# Patient Record
Sex: FEMALE | Race: WHITE | NOT HISPANIC OR LATINO | URBAN - METROPOLITAN AREA
[De-identification: names, ages, dates, MRNs, and addresses within clinical notes are randomized per-mention and may not be internally consistent; named-entity substitution may affect disease eponyms.]

---

## 2021-06-10 ENCOUNTER — OFFICE VISIT (OUTPATIENT)
Dept: DERMATOLOGY | Facility: CLINIC | Age: 86
End: 2021-06-10
Payer: COMMERCIAL

## 2021-06-10 VITALS — TEMPERATURE: 98.3 F

## 2021-06-10 DIAGNOSIS — L81.4 SOLAR LENTIGO: Primary | ICD-10-CM

## 2021-06-10 DIAGNOSIS — D48.5 NEOPLASM OF UNCERTAIN BEHAVIOR OF SKIN: ICD-10-CM

## 2021-06-10 PROCEDURE — 99203 OFFICE O/P NEW LOW 30 MIN: CPT | Performed by: DERMATOLOGY

## 2021-06-10 NOTE — PATIENT INSTRUCTIONS
Assessment and Plan:  Based on a thorough discussion of this condition and the management approach to it (including a comprehensive discussion of the known risks, side effects and potential benefits of treatment), the patient (family) agrees to implement the following specific plan:   Reassured benign   When outside we recommend using a wide brim hat, sunglasses, long sleeve and pants, sunscreen with SPF 14+ with reapplication every 2 hours, or SPF specific clothing      What is a lentigo? A lentigo is a pigmented flat or slightly raised lesion with a clearly defined edge  Unlike an ephelis (freckle), it does not fade in the winter months  There are several kinds of lentigo  The name lentigo originally referred to its appearance resembling a small lentil  The plural of lentigo is lentigines, although lentigos is also in common use  Who gets lentigines? Lentigines can affect males and females of all ages and races  Solar lentigines are especially prevalent in fair skinned adults  Lentigines associated with syndromes are present at birth or arise during childhood  What causes lentigines? Common forms of lentigo are due to exposure to ultraviolet radiation:   Sun damage including sunburn    Indoor tanning    Phototherapy, especially photochemotherapy (PUVA)    Ionizing radiation, eg radiation therapy, can also cause lentigines  Several familial syndromes associated with widespread lentigines originate from mutations in Denzel-MAP kinase, mTOR signaling and PTEN pathways  What are the clinical features of lentigines? Lentigines have been classified into several different types depending on what they look like, where they appear on the body, causative factors, and whether they are associated to other diseases or conditions  Lentigines may be solitary or more often, multiple  Most lentigines are smaller than 5 mm in diameter      Lentigo simplex   A precursor to junctional naevus    Arises during childhood and early adult life    Found on trunk and limbs    Small brown round or oval macule or thin plaque    Jagged or smooth edge    May have a dry surface    May disappear in time  Solar lentigo   A precursor to seborrhoeic keratosis    Found on chronically sun exposed sites such as hands, face, lower legs    May also follow sunburn to shoulders    Yellow, light or dark brown regular or irregular macule or thin plaque    May have a dry surface    Often has moth-eaten outline    Can slowly enlarge to several centimeters in diameter    May disappear, often through the process known as lichenoid keratosis    When atypical in appearance, may be difficult to distinguish from melanoma in situ  Ink spot lentigo   Also known as reticulated lentigo    Few in number compared to solar lentigines    Follows sunburn in very fair skinned individuals    Dark brown to black irregular ink spot-like macule  PUVA lentigo   Similar to ink spot lentigo but follows photochemotherapy (PUVA)    Location anywhere exposed to PUVA  Tanning bed lentigo   Similar to ink spot lentigo but follows indoor tanning    Location anywhere exposed to tanning bed  Radiation lentigo   Occurs in site of irradiation (accidental or therapeutic)    Associated with late-stage radiation dermatitis: epidermal atrophy, subcutaneous fibrosis, keratosis, telangiectasias  Melanotic macule   Mucosal surfaces or adjacent glabrous skin eg lip, vulva, penis, anus    Light to dark brown    Also called mucosal melanosis  Generalised lentigines   Found on any exposed or covered site from early childhood    Small macules may merge to form larger patches    Not associated with a syndrome    Also called lentigines profusa, multiple lentigines  Agminated lentigines   Naevoid eruption of lentigos confined to a single segmental area    Sharp demarcation in midline    May have associated neurological and developmental abnormalities  Patterned lentigines   Inherited tendency to lentigines on face, lips, buttocks, palms, soles    Recognised mainly in people of  ethnicity  Centrofacial neurodysraphic lentiginosis  Associated with mental retardation  Lentiginosis syndromes   Syndromes include LEOPARD/[place], Peutz-Jeghers, Laugier-Hunziker, Moynahan, Xeroderma pigmentosum, myxoma syndromes (MARRUFO, NAME, Hein), Ruvalcaba-Myhre-Pascual, Bannayan-Zonnana syndrome, Cowden disease (multiple hamartoma syndrome )    Inheritance is autosomal dominant; sporadic cases common    Widespread lentigines present at birth or arise in early childhood    Associated with neural, endocrine, and mesenchymal tumors    How is the diagnosis made? Lentigines are usually diagnosed clinically by their typical appearance  Concern regarding possibility of melanoma may lead to:   Dermatoscopy    Diagnostic excision biopsy    Histopathology of a lentigo shows:   Thickened epidermis    An increased number of melanocytes along the basal layer of epidermis    Unlike junctional melanocytic naevus, there are no nests of melanocytes    Increased melanin pigment within the keratinocytes    Additional features depending on type of lentigo    In contrast, an ephelis (freckle) shows sun-induced increased melanin within the keratinocytes, without an increase in number of cells  What is the treatment for lentigines? Most lentigines are left alone  Attempts to lighten them may not be successful  The following approaches are used:   SPF 50+ broad-spectrum sunscreen    Hydroquinone bleaching cream    Alpha hydroxy acids    Vitamin C    Retinoids    Azelaic acid    Chemical peels  Individual lesions can be permanently removed using:   Cryotherapy    Intense pulsed light    Pigment lasers    How can lentigines be prevented? Lentigines associated with exposure ultraviolet radiation can be prevented by very careful sun protection   Clothing is more successful at preventing new lentigines than are sunscreens  What is the outlook for lentigines? Lentigines usually persist  They may increase in number with age and sun exposure  Some in sun-protected sites may fade and disappear  Assessment and Plan:   I have discussed with the patient that a sample of skin via a "skin biopsy would be potentially helpful to further make a specific diagnosis under the microscope   Based on a thorough discussion of this condition and the management approach to it (including a comprehensive discussion of the known risks, side effects and potential benefits of treatment), the patient (family) agrees to implement the following specific plan:    o Procedure:  Skin Biopsy  After a thorough discussion of treatment options and risk/benefits/alternatives (including but not limited to local pain, scarring, dyspigmentation, blistering, possible superinfection, and inability to confirm a diagnosis via histopathology), verbal and written consent were obtained and portion of the rash was biopsied for tissue sample  See below for consent that was obtained from patient and subsequent Procedure Note    o Due to no answer from Son Kandi Tierney- we will have to hold off on biopsy, once son approves we can go ahead and preform biopsy  VM was left for son to determine if he wants to go ahead with biopsy or monitor

## 2021-06-10 NOTE — PROGRESS NOTES
[address] [facility] Note     Patient Name: Mary Urban  Encounter Date: 06/10/21       Have you been cared for by a [facility] Dermatologist in the last 3 years and, if so, which one? No    · Have you traveled outside of the [address] [place] [address] in the past 3 months or outside of [place] [facility] [place] in the last 2 weeks? No     May we call your Preferred Phone number to discuss your specific medical information? Yes     May we leave a detailed message that includes your specific medical information? Yes      Today's Chief Concerns:   Concern #1:  Skin exam; actinic keratosis       Past Medical History:  Have you personally ever had or currently have any of the following? · Skin cancer (such as Melanoma, Basal Cell Carcinoma, Squamous Cell Carcinoma? (If Yes, please provide more detail)- Unsure - poor historian Assisted living patient   · Eczema: No  · Psoriasis: No  · HIV/AIDS: No  · Hepatitis B or C: No  · Tuberculosis: No  · Systemic Immunosuppression such as Diabetes, Biologic or Immunotherapy, Chemotherapy, Organ Transplantation, Bone Marrow Transplantation (If YES, please provide more detail): No  · Radiation Treatment (If YES, please provide more detail): No  · Any other major medical conditions/concerns? (If Yes, which types)- YES, Unsure - poor historian Assisted living patient     Social History:     What is/was your primary occupation?  What are your hobbies/past-times? Family History:  Have any of your "first degree relatives" (parent, brother, sister, or child) had any of the following       · Skin cancer such as Melanoma or Merkel Cell Carcinoma or Pancreatic Cancer? Unsure - poor historian Assisted living patient   · Eczema, Asthma, Hay Fever or Seasonal Allergies: No  · Psoriasis or Psoriatic Arthritis: No  · Do any other medical conditions seem to run in your family? If Yes, what condition and which relatives?   No    Current Medications: No current outpatient medications on file  Review of Systems:  Have you recently had or currently have any of the following? If YES, what are you doing for the problem? · Fever, chills or unintended weight loss: No  · Sudden loss or change in your vision: No  · Nausea, vomiting or blood in your stool: No  · Painful or swollen joints: No  · Wheezing or cough: No  · Changing mole or non-healing wound: No  · Nosebleeds: No  · Excessive sweating: No  · Easy or prolonged bleeding? No  · Over the last 2 weeks, how often have you been bothered by the following problems? · Taking little interest or pleasure in doing things: 1 - Not at All  · Feeling down, depressed, or hopeless: 1 - Not at All  · Rapid heartbeat with epinephrine:  No    · FEMALES ONLY:    · Are you pregnant or planning to become pregnant? No  · Are you currently or planning to be nursing or breast feeding? No    · Any known allergies? Not on File      Physical Exam:     Was a chaperone (Derm Clinical Assistant) present throughout the entire Physical Exam? Yes     Did the Dermatology Team specifically  the patient on the importance of a Full Skin Exam to be sure that nothing is missed clinically?  Yes}  o Did the patient ultimately request or accept a Full Skin Exam?  Yes  o Did the patient specifically refuse to have the areas "under-the-bra" examined by the Dermatologist? No  o Did the patient specifically refuse to have the areas "under-the-underwear" examined by the Dermatologist? No    CONSTITUTIONAL:   Vitals:    06/10/21 0949   Temp: 98 3 °F (36 8 °C)       PSYCH: Normal mood and affect  EYES: Normal conjunctiva  ENT: Normal lips and oral mucosa  CARDIOVASCULAR: No edema  RESPIRATORY: Normal respirations  HEME/LYMPH/IMMUNO:  No regional lymphadenopathy except as noted below in "ASSESSMENT AND PLAN BY DIAGNOSIS"    SKIN:  FULL ORGAN SYSTEM EXAM   Hair, Scalp, Ears, Face Normal except as noted below in Assessment   Neck, Cervical Chain Nodes Normal except as noted below in Assessment   Right Arm/Hand/Fingers Normal except as noted below in Assessment   Left Arm/Hand/Fingers Normal except as noted below in Assessment   Chest/Breasts/Axillae Viewed areas Normal except as noted below in Assessment   Abdomen, Umbilicus Normal except as noted below in Assessment   Back/Spine Normal except as noted below in Assessment   Groin/Genitalia/Buttocks Normal except as noted below in Assessment   Right Leg, Foot, Toes Normal except as noted below in Assessment   Left Leg, Foot, Toes Normal except as noted below in Assessment        Assessment and Plan by Diagnosis:    History of Present Condition:    79 yo female present today to establish care with dermatology, was referred by assisted living is present today with transport assistance  Patient is a poor historian note from [facility] states " HX of Actinic Keratosis, previous dermatologist is [place], They recommended a follow up  Patient unsure where this is located in her body or what was done to it  LENTIGO    Physical Exam:   Anatomic Location Affected:  Bridge of nose 0 5 cm , right cheek 0 6   Morphological Description:  Brown macule   Pertinent Positives:   Pertinent Negatives: Additional History of Present Condition:  Discovered upon skin exam     Assessment and Plan:  Based on a thorough discussion of this condition and the management approach to it (including a comprehensive discussion of the known risks, side effects and potential benefits of treatment), the patient (family) agrees to implement the following specific plan:   Reassured benign   When outside we recommend using a wide brim hat, sunglasses, long sleeve and pants, sunscreen with SPF 92+ with reapplication every 2 hours, or SPF specific clothing      What is a lentigo? A lentigo is a pigmented flat or slightly raised lesion with a clearly defined edge   Unlike an ephelis (freckle), it does not fade in the winter months  There are several kinds of lentigo  The name lentigo originally referred to its appearance resembling a small lentil  The plural of lentigo is lentigines, although lentigos is also in common use  Who gets lentigines? Lentigines can affect males and females of all ages and races  Solar lentigines are especially prevalent in fair skinned adults  Lentigines associated with syndromes are present at birth or arise during childhood  What causes lentigines? Common forms of lentigo are due to exposure to ultraviolet radiation:   Sun damage including sunburn    Indoor tanning    Phototherapy, especially photochemotherapy (PUVA)    Ionizing radiation, eg radiation therapy, can also cause lentigines  Several familial syndromes associated with widespread lentigines originate from mutations in Denzel-MAP kinase, mTOR signaling and PTEN pathways  What are the clinical features of lentigines? Lentigines have been classified into several different types depending on what they look like, where they appear on the body, causative factors, and whether they are associated to other diseases or conditions  Lentigines may be solitary or more often, multiple  Most lentigines are smaller than 5 mm in diameter      Lentigo simplex   A precursor to junctional naevus    Arises during childhood and early adult life    Found on trunk and limbs    Small brown round or oval macule or thin plaque    Jagged or smooth edge    May have a dry surface    May disappear in time  Solar lentigo   A precursor to seborrhoeic keratosis    Found on chronically sun exposed sites such as hands, face, lower legs    May also follow sunburn to shoulders    Yellow, light or dark brown regular or irregular macule or thin plaque    May have a dry surface    Often has moth-eaten outline    Can slowly enlarge to several centimeters in diameter    May disappear, often through the process known as lichenoid keratosis    When atypical in appearance, may be difficult to distinguish from melanoma in situ  Ink spot lentigo   Also known as reticulated lentigo    Few in number compared to solar lentigines    Follows sunburn in very fair skinned individuals    Dark brown to black irregular ink spot-like macule  PUVA lentigo   Similar to ink spot lentigo but follows photochemotherapy (PUVA)    Location anywhere exposed to PUVA  Tanning bed lentigo   Similar to ink spot lentigo but follows indoor tanning    Location anywhere exposed to tanning bed  Radiation lentigo   Occurs in site of irradiation (accidental or therapeutic)    Associated with late-stage radiation dermatitis: epidermal atrophy, subcutaneous fibrosis, keratosis, telangiectasias  Melanotic macule   Mucosal surfaces or adjacent glabrous skin eg lip, vulva, penis, anus    Light to dark brown    Also called mucosal melanosis  Generalised lentigines   Found on any exposed or covered site from early childhood    Small macules may merge to form larger patches    Not associated with a syndrome    Also called lentigines profusa, multiple lentigines  Agminated lentigines   Naevoid eruption of lentigos confined to a single segmental area    Sharp demarcation in midline    May have associated neurological and developmental abnormalities  Patterned lentigines   Inherited tendency to lentigines on face, lips, buttocks, palms, soles    Recognised mainly in people of  ethnicity  Centrofacial neurodysraphic lentiginosis  Associated with mental retardation  Lentiginosis syndromes   Syndromes include LEOPARD/[place], Peutz-Jeghers, Laugier-Hunziker, Moynahan, Xeroderma pigmentosum, myxoma syndromes (MARRUFO, NAME, Hein), Ruvalcaba-Myhre-Pascual, Bannayan-Zonnana syndrome, Cowden disease (multiple hamartoma syndrome )    Inheritance is autosomal dominant; sporadic cases common    Widespread lentigines present at birth or arise in early childhood    Associated with neural, endocrine, and mesenchymal tumors    How is the diagnosis made? Lentigines are usually diagnosed clinically by their typical appearance  Concern regarding possibility of melanoma may lead to:   Dermatoscopy    Diagnostic excision biopsy    Histopathology of a lentigo shows:   Thickened epidermis    An increased number of melanocytes along the basal layer of epidermis    Unlike junctional melanocytic naevus, there are no nests of melanocytes    Increased melanin pigment within the keratinocytes    Additional features depending on type of lentigo    In contrast, an ephelis (freckle) shows sun-induced increased melanin within the keratinocytes, without an increase in number of cells  What is the treatment for lentigines? Most lentigines are left alone  Attempts to lighten them may not be successful  The following approaches are used:   SPF 50+ broad-spectrum sunscreen    Hydroquinone bleaching cream    Alpha hydroxy acids    Vitamin C    Retinoids    Azelaic acid    Chemical peels  Individual lesions can be permanently removed using:   Cryotherapy    Intense pulsed light    Pigment lasers    How can lentigines be prevented? Lentigines associated with exposure ultraviolet radiation can be prevented by very careful sun protection  Clothing is more successful at preventing new lentigines than are sunscreens  What is the outlook for lentigines? Lentigines usually persist  They may increase in number with age and sun exposure  Some in sun-protected sites may fade and disappear  NEOPLASM OF UNCERTAIN BEHAVIOR OF SKIN    Physical Exam:   (Anatomic Location); (Size and Morphological Description); (Differential Diagnosis):  o Left dorsal hand; 1 5 cm crusted papule Rule out SCC   Pertinent Positives:   Pertinent Negatives:     Additional History of Present Condition:  Patient stated she thinks it came from getting blood work; discovered upon skin exam      Assessment and Plan:   I have discussed with the patient that a sample of skin via a "skin biopsy would be potentially helpful to further make a specific diagnosis under the microscope   Based on a thorough discussion of this condition and the management approach to it (including a comprehensive discussion of the known risks, side effects and potential benefits of treatment), the patient (family) agrees to implement the following specific plan:    o Procedure:  Skin Biopsy  After a thorough discussion of treatment options and risk/benefits/alternatives (including but not limited to local pain, scarring, dyspigmentation, blistering, possible superinfection, and inability to confirm a diagnosis via histopathology), verbal and written consent were obtained and portion of the rash was biopsied for tissue sample  See below for consent that was obtained from patient and subsequent Procedure Note    o Due to no answer from Son Alden Jenna- we will have to hold off on biopsy, once son approves we can go ahead and preform biopsy  VM was left for son to determine if he wants to go ahead with biopsy or monitor

## 2021-06-15 ENCOUNTER — TELEPHONE (OUTPATIENT)
Dept: DERMATOLOGY | Facility: CLINIC | Age: 86
End: 2021-06-15

## 2021-06-15 NOTE — TELEPHONE ENCOUNTER
Please advise on type of apt pt needs for biopsy - [facility] or [facility] as I am trying to schedule pt's apt  I'd like to schedule pt for 7/13 at 2:45 pm at [facility], but there is proc long scheduled at 3 pm   However, I really only need 15 min for pt's biopsy  Can you please assist me with scheduling pt  Thank you!

## 2021-07-13 ENCOUNTER — OFFICE VISIT (OUTPATIENT)
Dept: DERMATOLOGY | Facility: CLINIC | Age: 86
End: 2021-07-13
Payer: COMMERCIAL

## 2021-07-13 VITALS — HEIGHT: 62 IN | TEMPERATURE: 97.6 F | BODY MASS INDEX: 27.23 KG/M2 | WEIGHT: 148 LBS

## 2021-07-13 DIAGNOSIS — D48.5 NEOPLASM OF UNCERTAIN BEHAVIOR OF SKIN: Primary | ICD-10-CM

## 2021-07-13 PROCEDURE — 88305 TISSUE EXAM BY PATHOLOGIST: CPT | Performed by: STUDENT IN AN ORGANIZED HEALTH CARE EDUCATION/TRAINING PROGRAM

## 2021-07-13 PROCEDURE — 11102 TANGNTL BX SKIN SINGLE LES: CPT | Performed by: DERMATOLOGY

## 2021-07-13 PROCEDURE — 99213 OFFICE O/P EST LOW 20 MIN: CPT | Performed by: DERMATOLOGY

## 2021-07-13 NOTE — PROGRESS NOTES
[facility] Follow Up Note    Patient Name: Jenny Bahena  Encounter Date: 07/13/21    Today's Chief Concerns:  Sapna Sheikh Concern #1:  Growth on right hand      Current Medications:  No current outpatient medications on file  CONSTITUTIONAL:   Vitals:    07/13/21 1458   Temp: 97 6 °F (36 4 °C)   TempSrc: Temporal   Weight: 67 1 kg (148 lb)   Height: 5' 2" (1 575 m)       Specific Alerts:    Have you been seen by a [facility] Dermatologist in the last 3 years? YES    Are you pregnant or planning to become pregnant? No    Are you currently or planning to be nursing or breast feeding? No    Allergies   Allergen Reactions    5ht3 Receptor Antagonists Hives    Codeine Hives       May we call your Preferred Phone number to discuss your specific medical information? YES    May we leave a detailed message that includes your specific medical information? YES    Have you traveled outside of the [facility] in the past 3 months? No    Do you currently have a pacemaker or defibrillator? No    Do you have any artificial heart valves, joints, plates, screws, rods, stents, pins, etc? YES   - If Yes, were any placed within the last 2 years? Do you require any medications prior to a surgical procedure? No    Are you taking any medications that cause you to bleed more easily ("blood thinners") YES    Have you ever experienced a rapid heartbeat with epinephrine? No    Have you ever been treated with "gold" (gold sodium thiomalate) therapy? No    formerly [facility] can help with wrinkles, "laugh lines," facial volume loss, "double chin," "love handles," age spots, and more  Are you interested in learning today about some of the skin enhancement procedures that we offer? (If Yes, please provide more detail) No    Review of Systems:  Have you recently had or currently have any of the following?     · Fever or chills: No  · Night Sweats: No  · Headaches: No  · Weight Gain: No  · Weight Loss: No  · Blurry Vision: YES  · Nausea: No  · Vomiting: No  · Diarrhea: No  · Blood in Stool: No  · Abdominal Pain: No  · Itchy Skin: YES  · Painful Joints: No  · Swollen Joints: No  · Muscle Pain: No  · Irregular Mole: No  · Sun Burn: No  · Dry Skin: YES  · Skin Color Changes: No  · Scar or Keloid: YES  · Cold Sores/Fever Blisters: YES  · Bacterial Infections/MRSA: No  · Anxiety: No  · Depression: No  · Suicidal or Homicidal Thoughts: No      PSYCH: Normal mood and affect  EYES: Normal conjunctiva  ENT: Normal lips and oral mucosa  CARDIOVASCULAR: No edema  RESPIRATORY: Normal respirations  HEME/LYMPH/IMMUNO:  No regional lymphadenopathy except as noted below in ASSESSMENT AND PLAN BY DIAGNOSIS    FOCUSED ORGAN SYSTEM SKIN EXAM (SKIN)   Left Hand Normal except as noted below in Assessment       NEOPLASM OF UNCERTAIN BEHAVIOR OF SKIN    Physical Exam:   (Anatomic Location); (Size and Morphological Description); (Differential Diagnosis):    Specimen A: dorsal left hand skin ; shave biopsy; 80year old female with   a 0 6cm erythematis papule with firm scale    Additional History of Present Condition:  Patient not sure how long its been there    Assessment and Plan:   I have discussed with the patient that a sample of skin via a "skin biopsy would be potentially helpful to further make a specific diagnosis under the microscope   Based on a thorough discussion of this condition and the management approach to it (including a comprehensive discussion of the known risks, side effects and potential benefits of treatment), the patient (family) agrees to implement the following specific plan:    o Procedure:  Skin Biopsy    After a thorough discussion of treatment options and risk/benefits/alternatives (including but not limited to local pain, scarring, dyspigmentation, blistering, possible superinfection, and inability to confirm a diagnosis via histopathology), verbal and written consent were obtained and portion of the rash was biopsied for tissue sample  See below for consent that was obtained from patient and subsequent Procedure Note  PROCEDURE SHAVE BIOPSY NOTE:     Performing Physician: Dez Barrientos   Anatomic Location; Clinical Description with size (cm); Pre-Op Diagnosis:   o Specimen A: dorsal left hand skin ; shave biopsy; 80year old female with a 0 6cm erythematis papule with firm scale   Post-op diagnosis: Same      Local anesthesia: 1% xylocaine with epi       Topical anesthesia: None     Hemostasis: Aluminum chloride       After obtaining informed consent  at which time there was a discussion about the purpose of biopsy  and low risks of infection and bleeding  The area was prepped and draped in the usual fashion  Anesthesia was obtained with 1% lidocaine with epinephrine  A shave biopsy to an appropriate sampling depth was obtained with a sterile blade (such as a 15-blade or DermaBlade)  The resulting wound was covered with surgical ointment and bandaged appropriately  The patient tolerated the procedure well without complications and was without signs of functional compromise  Specimen has been sent for review by Dermatopathology  Standard post-procedure care has been explained and has been included in written form within the patient's copy of Informed Consent  INFORMED CONSENT DISCUSSION AND POST-OPERATIVE INSTRUCTIONS FOR PATIENT    I   RATIONALE FOR PROCEDURE  I understand that a skin biopsy allows the Dermatologist to test a lesion or rash under the microscope to obtain a diagnosis  It usually involves numbing the area with numbing medication and removing a small piece of skin; sometimes the area will be closed with sutures  In this specific procedure, sutures are not usually needed  If any sutures are placed, then they are usually need to be removed in 2 weeks or less  I understand that my Dermatologist recommends that a skin "shave" biopsy be performed today    A local anesthetic, similar to the kind that a dentist uses when filling a cavity, will be injected with a very small needle into the skin area to be sampled  The injected skin and tissue underneath "will go to sleep and become numb so no pain should be felt afterwards  An instrument shaped like a tiny "razor blade" (shave biopsy instrument) will be used to cut a small piece of tissue and skin from the area so that a sample of tissue can be taken and examined more closely under the microscope  A slight amount of bleeding will occur, but it will be stopped with direct pressure and a pressure bandage and any other appropriate methods  I understands that a scar will form where the wound was created  Surgical ointment will be applied to help protect the wound  Sutures are not usually needed  II   RISKS AND POTENTIAL COMPLICATIONS   I understand the risks and potential complications of a skin biopsy include but are not limited to the following:   Bleeding   Infection   Pain   Scar/keloid   Skin discoloration   Incomplete Removal   Recurrence   Nerve Damage/Numbness/Loss of Function   Allergic Reaction to Anesthesia   Biopsies are diagnostic procedures and based on findings additional treatment or evaluation may be required   Loss or destruction of specimen resulting in no additional findings    My Dermatologist has explained to me the nature of the condition, the nature of the procedure, and the benefits to be reasonably expected compared with alternative approaches  My Dermatologist has discussed the likelihood of major risks or complications of this procedure including the specific risks listed above, such as bleeding, infection, and scarring/keloid  I understand that a scar is expected after this procedure  I understand that my physician cannot predict if the scar will form a "keloid," which extends beyond the borders of the wound that is created  A keloid is a thick, painful, and bumpy scar    A keloid can be difficult to treat, as it does not always respond well to therapy, which includes injecting cortisone directly into the keloid every few weeks  While this usually reduces the pain and size of the scar, it does not eliminate it  I understand that photographs may be taken before and after the procedure  These will be maintained as part of the medical providers confidential records and may not be made available to me  I further authorize the medical provider to use the photographs for teaching purposes or to illustrate scientific papers, books, or lectures if in his/her judgment, medical research, education, or science may benefit from its use  I have had an opportunity to fully inquire about the risks and benefits of this procedure and its alternatives  I have been given ample time and opportunity to ask questions and to seek a second opinion if I wished to do so  I acknowledge that there have specifically been no guarantees as to the cosmetic results from the procedure  I am aware that with any procedure there is always the possibility of an unexpected complication  III  POST-PROCEDURAL CARE (WHAT YOU WILL NEED TO DO "AFTER THE BIOPSY" TO OPTIMIZE HEALING)     Keep the area clean and dry  Try NOT to remove the bandage or get it wet for the first 24 hours   Gently clean the area and apply surgical ointment (such as Vaseline petrolatum ointment, which is available "over the counter" and not a prescription) to the biopsy site for up to 2 weeks straight  This acts to protect the wound from the outside world   Sutures are not usually placed in this procedure  If any sutures were placed, return for suture removal as instructed (generally 1 week for the face, 2 weeks for the body)   Take Acetaminophen (Tylenol) for discomfort, if no contraindications  Ibuprofen or aspirin could make bleeding worse       Call our office immediately for signs of infection: fever, chills, increased redness, warmth, tenderness, discomfort/pain, or pus or foul smell coming from the wound  WHAT TO DO IF THERE IS ANY BLEEDING? If a small amount of bleeding is noticed, place a clean cloth over the area and apply firm pressure for ten minutes  Check the wound after 10 minutes of direct pressure  If bleeding persists, try one more time for an additional 10 minutes of direct pressure on the area  If the bleeding becomes heavier or does not stop after the second attempt, or if you have any other questions about this procedure, then please call your [facility] Dermatologist by calling [phone] (SKIN)  I hereby acknowledge that I have reviewed and verified the site with my Dermatologist and have requested and authorized my Dermatologist to proceed with the procedure        Scribe Attestation    I,:  Shine Burton am acting as a scribe while in the presence of the attending physician :       I,:  Juan Wood MD personally performed the services described in this documentation    as scribed in my presence :

## 2021-07-13 NOTE — PATIENT INSTRUCTIONS
NEOPLASM OF UNCERTAIN BEHAVIOR OF SKIN      Assessment and Plan:   I have discussed with the patient that a sample of skin via a "skin biopsy would be potentially helpful to further make a specific diagnosis under the microscope   Based on a thorough discussion of this condition and the management approach to it (including a comprehensive discussion of the known risks, side effects and potential benefits of treatment), the patient (family) agrees to implement the following specific plan:    o Procedure:  Skin Biopsy  After a thorough discussion of treatment options and risk/benefits/alternatives (including but not limited to local pain, scarring, dyspigmentation, blistering, possible superinfection, and inability to confirm a diagnosis via histopathology), verbal and written consent were obtained and portion of the rash was biopsied for tissue sample  See below for consent that was obtained from patient and subsequent Procedure Note  INFORMED CONSENT DISCUSSION AND POST-OPERATIVE INSTRUCTIONS FOR PATIENT    I   RATIONALE FOR PROCEDURE  I understand that a skin biopsy allows the Dermatologist to test a lesion or rash under the microscope to obtain a diagnosis  It usually involves numbing the area with numbing medication and removing a small piece of skin; sometimes the area will be closed with sutures  In this specific procedure, sutures are not usually needed  If any sutures are placed, then they are usually need to be removed in 2 weeks or less  I understand that my Dermatologist recommends that a skin "shave" biopsy be performed today  A local anesthetic, similar to the kind that a dentist uses when filling a cavity, will be injected with a very small needle into the skin area to be sampled  The injected skin and tissue underneath "will go to sleep and become numb so no pain should be felt afterwards    An instrument shaped like a tiny "razor blade" (shave biopsy instrument) will be used to cut a small piece of tissue and skin from the area so that a sample of tissue can be taken and examined more closely under the microscope  A slight amount of bleeding will occur, but it will be stopped with direct pressure and a pressure bandage and any other appropriate methods  I understands that a scar will form where the wound was created  Surgical ointment will be applied to help protect the wound  Sutures are not usually needed  II   RISKS AND POTENTIAL COMPLICATIONS   I understand the risks and potential complications of a skin biopsy include but are not limited to the following:   Bleeding   Infection   Pain   Scar/keloid   Skin discoloration   Incomplete Removal   Recurrence   Nerve Damage/Numbness/Loss of Function   Allergic Reaction to Anesthesia   Biopsies are diagnostic procedures and based on findings additional treatment or evaluation may be required   Loss or destruction of specimen resulting in no additional findings    My Dermatologist has explained to me the nature of the condition, the nature of the procedure, and the benefits to be reasonably expected compared with alternative approaches  My Dermatologist has discussed the likelihood of major risks or complications of this procedure including the specific risks listed above, such as bleeding, infection, and scarring/keloid  I understand that a scar is expected after this procedure  I understand that my physician cannot predict if the scar will form a "keloid," which extends beyond the borders of the wound that is created  A keloid is a thick, painful, and bumpy scar  A keloid can be difficult to treat, as it does not always respond well to therapy, which includes injecting cortisone directly into the keloid every few weeks  While this usually reduces the pain and size of the scar, it does not eliminate it  I understand that photographs may be taken before and after the procedure    These will be maintained as part of the medical providers confidential records and may not be made available to me  I further authorize the medical provider to use the photographs for teaching purposes or to illustrate scientific papers, books, or lectures if in his/her judgment, medical research, education, or science may benefit from its use  I have had an opportunity to fully inquire about the risks and benefits of this procedure and its alternatives  I have been given ample time and opportunity to ask questions and to seek a second opinion if I wished to do so  I acknowledge that there have specifically been no guarantees as to the cosmetic results from the procedure  I am aware that with any procedure there is always the possibility of an unexpected complication  III  POST-PROCEDURAL CARE (WHAT YOU WILL NEED TO DO "AFTER THE BIOPSY" TO OPTIMIZE HEALING)     Keep the area clean and dry  Try NOT to remove the bandage or get it wet for the first 24 hours   Gently clean the area and apply surgical ointment (such as Vaseline petrolatum ointment, which is available "over the counter" and not a prescription) to the biopsy site for up to 2 weeks straight  This acts to protect the wound from the outside world   Sutures are not usually placed in this procedure  If any sutures were placed, return for suture removal as instructed (generally 1 week for the face, 2 weeks for the body)   Take Acetaminophen (Tylenol) for discomfort, if no contraindications  Ibuprofen or aspirin could make bleeding worse   Call our office immediately for signs of infection: fever, chills, increased redness, warmth, tenderness, discomfort/pain, or pus or foul smell coming from the wound  WHAT TO DO IF THERE IS ANY BLEEDING? If a small amount of bleeding is noticed, place a clean cloth over the area and apply firm pressure for ten minutes  Check the wound after 10 minutes of direct pressure    If bleeding persists, try one more time for an additional 10 minutes of direct pressure on the area  If the bleeding becomes heavier or does not stop after the second attempt, or if you have any other questions about this procedure, then please call your [facility]ologist by calling [phone] (SKIN)  I hereby acknowledge that I have reviewed and verified the site with my Dermatologist and have requested and authorized my Dermatologist to proceed with the procedure

## 2022-07-14 ENCOUNTER — HOSPITAL ENCOUNTER (EMERGENCY)
Facility: HOSPITAL | Age: 87
Discharge: NURSING, REHAB OR LONG-TERM CARE | End: 2022-07-14
Attending: SURGERY
Payer: COMMERCIAL

## 2022-07-14 ENCOUNTER — APPOINTMENT (EMERGENCY)
Dept: RADIOLOGY | Facility: HOSPITAL | Age: 87
End: 2022-07-14
Payer: COMMERCIAL

## 2022-07-14 ENCOUNTER — APPOINTMENT (EMERGENCY)
Dept: CT IMAGING | Facility: HOSPITAL | Age: 87
End: 2022-07-14
Payer: COMMERCIAL

## 2022-07-14 VITALS
RESPIRATION RATE: 18 BRPM | DIASTOLIC BLOOD PRESSURE: 67 MMHG | TEMPERATURE: 97.8 F | OXYGEN SATURATION: 96 % | SYSTOLIC BLOOD PRESSURE: 148 MMHG | WEIGHT: 187.61 LBS | HEART RATE: 86 BPM

## 2022-07-14 DIAGNOSIS — T14.90XA TRAUMA: ICD-10-CM
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
## 2022-07-14 LAB
BASE EXCESS BLDA CALC-SCNC: 7 MMOL/L (ref -2–3)
CA-I BLD-SCNC: 1.07 MMOL/L (ref 1.12–1.32)
GLUCOSE SERPL-MCNC: 104 MG/DL (ref 65–140)
HCO3 BLDA-SCNC: 32.9 MMOL/L (ref 24–30)
HCT VFR BLD CALC: 41 % (ref 34.8–46.1)
HGB BLDA-MCNC: 13.9 G/DL (ref 11.5–15.4)
PCO2 BLD: 34 MMOL/L (ref 21–32)
PCO2 BLD: 51.4 MM HG (ref 42–50)
PH BLD: 7.42 [PH] (ref 7.3–7.4)
PO2 BLD: 24 MM HG (ref 35–45)
POTASSIUM BLD-SCNC: 4.2 MMOL/L (ref 3.5–5.3)
SAO2 % BLD FROM PO2: 42 % (ref 60–85)
SODIUM BLD-SCNC: 132 MMOL/L (ref 136–145)
SPECIMEN SOURCE: ABNORMAL

## 2022-07-14 PROCEDURE — 76705 ECHO EXAM OF ABDOMEN: CPT | Performed by: SURGERY

## 2022-07-14 PROCEDURE — 84132 ASSAY OF SERUM POTASSIUM: CPT

## 2022-07-14 PROCEDURE — 71250 CT THORAX DX C-: CPT

## 2022-07-14 PROCEDURE — 72125 CT NECK SPINE W/O DYE: CPT

## 2022-07-14 PROCEDURE — 71045 X-RAY EXAM CHEST 1 VIEW: CPT

## 2022-07-14 PROCEDURE — NC001 PR NO CHARGE: Performed by: EMERGENCY MEDICINE

## 2022-07-14 PROCEDURE — NC001 PR NO CHARGE: Performed by: NURSE PRACTITIONER

## 2022-07-14 PROCEDURE — 70450 CT HEAD/BRAIN W/O DYE: CPT

## 2022-07-14 PROCEDURE — 82330 ASSAY OF CALCIUM: CPT

## 2022-07-14 PROCEDURE — 85014 HEMATOCRIT: CPT

## 2022-07-14 PROCEDURE — 82947 ASSAY GLUCOSE BLOOD QUANT: CPT

## 2022-07-14 PROCEDURE — 93308 TTE F-UP OR LMTD: CPT | Performed by: SURGERY

## 2022-07-14 PROCEDURE — 74176 CT ABD & PELVIS W/O CONTRAST: CPT

## 2022-07-14 PROCEDURE — 99284 EMERGENCY DEPT VISIT MOD MDM: CPT

## 2022-07-14 PROCEDURE — 82803 BLOOD GASES ANY COMBINATION: CPT

## 2022-07-14 PROCEDURE — 84295 ASSAY OF SERUM SODIUM: CPT

## 2022-07-14 PROCEDURE — 99284 EMERGENCY DEPT VISIT MOD MDM: CPT | Performed by: SURGERY

## 2022-07-14 RX ORDER — ACETAMINOPHEN 325 MG/1
975 TABLET ORAL EVERY 8 HOURS SCHEDULED
Status: DISCONTINUED | OUTPATIENT
Start: 2022-07-14 | End: 2022-07-14 | Stop reason: HOSPADM

## 2022-07-14 RX ORDER — ACETAMINOPHEN 325 MG/1
650 TABLET ORAL EVERY 6 HOURS PRN
Qty: 30 TABLET | Refills: 0 | Status: SHIPPED | OUTPATIENT
Start: 2022-07-14

## 2022-07-14 RX ADMIN — ACETAMINOPHEN 975 MG: 325 TABLET ORAL at 18:17

## 2022-07-14 NOTE — ED NOTES
Transfer Information:    Ambulance Squad: Ale Baptise Time: 3301   Destination: SNF   Accepting Physician:    Report Number:           April MARGA Pinedo, RN  07/14/22 1439    Primary RN made aware via tiger text     Rivka Hawk, LAURA  07/14/22 1432

## 2022-07-14 NOTE — PROCEDURES
POC FAST US    Date/Time: 7/14/2022 12:12 PM  Performed by: Yeison Miranda DO  Authorized by: Yeison Miranda DO     Patient location:  Trauma  Other Assisting Provider: Yes (comment) (tariq NP-student)    Procedure details:     Exam Type:  Diagnostic    Indications: blunt abdominal trauma and blunt chest trauma      Assess for:  Intra-abdominal fluid and pericardial effusion    Technique: FAST      Views obtained:  Heart - Pericardial sac, RUQ - Suárez's Pouch, LUQ - Splenorenal space and Suprapubic - Pouch of Colin    Image quality: limited diagnostic      Image availability:  Video obtained  FAST Findings:     RUQ (Hepatorenal) free fluid: absent      LUQ (Splenorenal) free fluid: absent      Suprapubic free fluid: absent      Cardiac wall motion: identified      Pericardial effusion: absent    Interpretation:     Impressions: negative    Comments:      History left nephrectomy

## 2022-07-14 NOTE — ED NOTES
Called hiram to arrange transport home for patient via [address] [place] [address] Street, RN  07/14/22 6539

## 2022-07-14 NOTE — H&P
H&P - Trauma   Jayda Burgos 80 y o  female MRN: [id number]  Unit/Bed#: ED 18 Encounter: [id number]    Trauma Alert: Level B   Model of Arrival: Ambulance    Trauma Team: Attending To and AP Grace Escobar  Consultants:     None     Assessment/Plan   Active Problems / Assessment:   S/p fall from standing, on plavix  Chronic T8 compression fracture     Plan:   No acute traumatic injuries  Will clear c-spine  Ambulate  DC home    History of Present Illness     Chief Complaint: neck pain  Mechanism:Fall     HPI:    Jayda Burgos is a 80 y o  female who presents s/ fall from standing, on plavix  Pt co neck pain, no numbness/tingling, no weakness  No f/c/n/v/cp  Of note, pt is on home O2 (2L) at baseline  Review of Systems   Constitutional: Negative  HENT: Negative  Respiratory: Negative  Cardiovascular: Negative  Gastrointestinal: Negative  Genitourinary: Negative  Musculoskeletal: Negative  Skin: Negative  All other systems reviewed and are negative  12-point, complete review of systems was reviewed and negative except as stated above  Historical Information     History reviewed  No pertinent past medical history  History reviewed  No pertinent surgical history  There is no immunization history on file for this patient  Family History: Non-contributory    1  Before the illness or injury that brought you to the Emergency, did you need someone to help you on a regular basis? 1=Yes   2  Since the illness or injury that brought you to the Emergency, have you needed more help than usual to take care of yourself? 0=No   3  Have you been hospitalized for one or more nights during the past 6 months (excluding a stay in the Emergency Department)? 0=No   4  In general, do you see well? 0=Yes   5  In general, do you have serious problems with your memory? 0=No   6  Do you take more than three different medications everyday?  1=Yes   TOTAL   2     Did you order a geriatric consult if the score was 2 or greater?: yes     Meds/Allergies   all current active meds have been reviewed   No Known Allergies    Objective   Initial Vitals:   Temperature: 97 8 °F (36 6 °C) (07/14/22 1200)  Pulse: 105 (07/14/22 1200)  Respirations: 18 (07/14/22 1200)  Blood Pressure: (!) 179/88 (07/14/22 1200)    Primary Survey:   Airway:        Status: patent;        Pre-hospital Interventions: none        Hospital Interventions: none  Breathing:        Pre-hospital Interventions: none       Effort: normal       Right breath sounds: normal       Left breath sounds: normal  Circulation:        Rhythm: regular       Rate: regular   Right Pulses Left Pulses    R radial: 2+  R femoral: 2+  R pedal: 2+     L radial: 2+  L femoral: 2+  L pedal: 2+       Disability:        GCS: Eye: 4; Verbal: 5 Motor: 6 Total: 15       Right Pupil: round;  reactive         Left Pupil:  round;  reactive      R Motor Strength L Motor Strength    R : 5/5  R dorsiflex: 5/5  R plantarflex: 5/5 L : 5/5  L dorsiflex: 5/5  L plantarflex: 5/5          Exposure:       Completed: Yes      Secondary Survey:  Physical Exam  Vitals reviewed  Constitutional:       Appearance: Normal appearance  She is obese  Interventions: Cervical collar and nasal cannula in place  Comments: On 2LNC at baseline   HENT:      Head: Atraumatic  Right Ear: Tympanic membrane normal       Left Ear: Tympanic membrane normal       Nose: Nose normal    Eyes:      Extraocular Movements: Extraocular movements intact  Conjunctiva/sclera: Conjunctivae normal       Pupils: Pupils are equal, round, and reactive to light  Neck:      Trachea: Trachea normal    Cardiovascular:      Rate and Rhythm: Normal rate  Pulses: Normal pulses  Radial pulses are 2+ on the right side and 2+ on the left side  Femoral pulses are 2+ on the right side and 2+ on the left side  Dorsalis pedis pulses are 2+ on the right side and 2+ on the left side  Pulmonary:      Effort: Pulmonary effort is normal       Breath sounds: Normal breath sounds  Chest:      Chest wall: No tenderness  Abdominal:      General: There is no distension  Palpations: Abdomen is soft  Tenderness: There is no abdominal tenderness  There is no guarding or rebound  Musculoskeletal:         General: Normal range of motion  Cervical back: Full passive range of motion without pain and normal range of motion  Tenderness present  Thoracic back: Tenderness present  Lumbar back: Tenderness present  Comments: No step-offs/crepitus   Skin:     General: Skin is warm and dry  Neurological:      General: No focal deficit present  Mental Status: She is alert  Mental status is at baseline  GCS: GCS eye subscore is 4  GCS verbal subscore is 5  GCS motor subscore is 6  Invasive Devices  Report    Peripheral Intravenous Line  Duration           Peripheral IV 07/14/22 Left Antecubital <1 day              Lab Results: Results: I have personally reviewed all pertinent laboratory/tests results    Imaging Results: I have personally reviewed pertinent reports  Chest Xray(s): negative for acute findings   FAST exam(s): negative for acute findings   CT Scan(s): pending   Additional Xray(s): N/A     Other Studies: N/A    Code Status: No Order  Advance Directive and Living Will:      Power of :    POLST:    I have spent 40 minutes with Patient  today in which greater than 50% of this time was spent in counseling/coordination of care regarding Diagnostic results and Impressions

## 2022-07-14 NOTE — CASE MANAGEMENT
CM responded to trauma alert  Patient transported to trauma bay by [facility]  Cm received face sheet and contacted emergency contact listed as social Lindajean Gosselin  CM spoke with Terrall Meckel, who stated that patient has POA her son Donna MATHEW-[phone]  Cm spoke with son who confirmed that patient is from [facility] assisted living  Son aware of patient's location  Cm made Trauma team aware of emergency contact  No current identified CM needs  CM will follow and update screening, assessment, and discharge planning as appropriate

## 2022-07-14 NOTE — DISCHARGE SUMMARY
Discharge Summary - Jim Petit 80 y o  female MRN: [id number]    Unit/Bed#: ED 18 Encounter: [id number]    Admission Date:     Admitting Diagnosis: Weakness [R53 1]    HPI: per Dr Taj Hunter  To:  "Jim Petit is a 80 y o  female who presents s/ fall from standing, on plavix  Pt co neck pain, no numbness/tingling, no weakness  No f/c/n/v/cp  Of note, pt is on home O2 (2L) at baseline  "    Procedures Performed:   Orders Placed This Encounter   Procedures    Fast Ultrasound    Fast Ultrasound       Summary of Hospital Course: 79 y/o female s/p fall from standing on plavix  Patient on O2 at 2 liters at home  No traumtitc injuries    Significant Findings, Care, Treatment and Services Provided: CT chest abdomen pelvis wo contrast    Result Date: 7/14/2022  Impression: No evidence of acute organ injury in the chest, abdomen or pelvis  Marked T8 compression fracture, morphology suggests chronicity  Correlate clinically with mechanism of injury and for point tenderness  Several nonemergent findings above  Workstation performed: [id number]     TRAUMA - CT head wo contrast    Result Date: 7/14/2022  Impression: No acute intracranial abnormality  Nonemergent findings above  Workstation performed: [id number]     TRAUMA - CT spine cervical wo contrast    Result Date: 7/14/2022  Impression: No cervical spine fracture or traumatic malalignment  Workstation performed: [id number]     XR Trauma multiple (B/RA trauma bay ONLY)    Result Date: 7/14/2022  Impression: No acute cardiopulmonary disease within limitations of supine imaging  Midthoracic compression fracture, indeterminate age  Please see today's CT chest report    Workstation performed: [id number]           Complications: none  Discharge Diagnosis: S/P Fall  No traumatic injuries  Chronic neck pain    Medical Problems                   Condition at Discharge: stable         Discharge instructions/Information to patient and family:   See after visit summary for information provided to patient and family  Provisions for Follow-Up Care:  See after visit summary for information related to follow-up care and any pertinent home health orders  PCP: Daniel Gonzalez MD    Disposition: Facility    Planned Readmission: No      Discharge Statement   I spent 30 minutes discharging the patient  This time was spent on the day of discharge  I had direct contact with the patient on the day of discharge  Additional documentation is required if more than 30 minutes were spent on discharge  Discharge Medications:  See after visit summary for reconciled discharge medications provided to patient and family  CT C-spine - neg  C-spine cleared clinically , FROM, does have chronic soreness  Collar removed

## 2022-07-14 NOTE — ED PROVIDER NOTES
Emergency Department Airway Evaluation and Management Form    History  Obtained from: EMS and patient  Patient has no allergy information on record  No chief complaint on file  42-year-old female, reported to be on oral anticoagulation, presents with fall out of wheelchair with neck and back injury  History provided by:  Patient and EMS personnel   used: No        No past medical history on file  No past surgical history on file  No family history on file  I have reviewed and agree with the history as documented  Review of Systems   Respiratory: Negative  Negative for shortness of breath  Cardiovascular: Negative  Negative for chest pain  Physical Exam  There were no vitals taken for this visit  Physical Exam  Vitals and nursing note reviewed  Constitutional:       General: She is not in acute distress  HENT:      Mouth/Throat:      Mouth: Mucous membranes are moist       Pharynx: Oropharynx is clear  Cardiovascular:      Rate and Rhythm: Tachycardia present  Pulmonary:      Effort: Pulmonary effort is normal       Breath sounds: Normal breath sounds  Neurological:      General: No focal deficit present  Mental Status: She is alert and oriented to person, place, and time  ED Medications  Medications - No data to display    Intubation  Procedures    Notes  42-year-old female, presenting with fall, call as trauma alert due to being on oral anticoagulation  The patient's airway intact, speaking full sentences, normal respiratory effort, lungs clear bilaterally      Final Diagnosis  Final diagnoses:   None       ED Provider  Electronically Signed by     Hugo Real MD  07/14/22 0552

## 2022-07-23 ENCOUNTER — APPOINTMENT (EMERGENCY)
Dept: RADIOLOGY | Facility: HOSPITAL | Age: 87
DRG: 184 | End: 2022-07-23
Payer: COMMERCIAL

## 2022-07-23 ENCOUNTER — APPOINTMENT (EMERGENCY)
Dept: CT IMAGING | Facility: HOSPITAL | Age: 87
DRG: 184 | End: 2022-07-23
Payer: COMMERCIAL

## 2022-07-23 ENCOUNTER — HOSPITAL ENCOUNTER (INPATIENT)
Facility: HOSPITAL | Age: 87
LOS: 7 days | Discharge: HOME HEALTH | DRG: 184 | End: 2022-07-30
Attending: EMERGENCY MEDICINE | Admitting: STUDENT IN AN ORGANIZED HEALTH CARE EDUCATION/TRAINING PROGRAM
Payer: COMMERCIAL

## 2022-07-23 DIAGNOSIS — S22.039A CLOSED FRACTURE OF THIRD THORACIC VERTEBRA, UNSPECIFIED FRACTURE MORPHOLOGY, INITIAL ENCOUNTER (HCC): ICD-10-CM

## 2022-07-23 DIAGNOSIS — E87.1 HYPONATREMIA: ICD-10-CM

## 2022-07-23 DIAGNOSIS — I50.9 CHRONIC CONGESTIVE HEART FAILURE, UNSPECIFIED HEART FAILURE TYPE (HCC): Chronic | ICD-10-CM
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
## 2022-07-23 DIAGNOSIS — S22.42XA CLOSED FRACTURE OF MULTIPLE RIBS OF LEFT SIDE, INITIAL ENCOUNTER: ICD-10-CM

## 2022-07-23 DIAGNOSIS — S22.39XA RIB FRACTURE: Primary | ICD-10-CM

## 2022-07-23 DIAGNOSIS — S22.030A COMPRESSION FRACTURE OF T3 VERTEBRA, INITIAL ENCOUNTER (HCC): ICD-10-CM

## 2022-07-23 PROBLEM — E03.9 HYPOTHYROID: Chronic | Status: ACTIVE | Noted: 2022-07-23

## 2022-07-23 PROBLEM — N18.30 CKD (CHRONIC KIDNEY DISEASE) STAGE 3, GFR 30-59 ML/MIN (HCC): Chronic | Status: ACTIVE | Noted: 2022-07-23

## 2022-07-23 PROBLEM — I10 HTN (HYPERTENSION): Chronic | Status: ACTIVE | Noted: 2022-07-23

## 2022-07-23 LAB
ALBUMIN SERPL BCP-MCNC: 3.9 G/DL (ref 3.5–5)
ALP SERPL-CCNC: 137 U/L (ref 34–104)
ALT SERPL W P-5'-P-CCNC: 15 U/L (ref 7–52)
ANION GAP SERPL CALCULATED.3IONS-SCNC: 9 MMOL/L (ref 4–13)
AST SERPL W P-5'-P-CCNC: 21 U/L (ref 13–39)
BASOPHILS # BLD AUTO: 0.06 THOUSANDS/ΜL (ref 0–0.1)
BASOPHILS NFR BLD AUTO: 1 % (ref 0–1)
BILIRUB SERPL-MCNC: 0.52 MG/DL (ref 0.2–1)
BUN SERPL-MCNC: 7 MG/DL (ref 5–25)
CALCIUM SERPL-MCNC: 9.2 MG/DL (ref 8.4–10.2)
CHLORIDE SERPL-SCNC: 89 MMOL/L (ref 96–108)
CK SERPL-CCNC: 47 U/L (ref 26–192)
CO2 SERPL-SCNC: 29 MMOL/L (ref 21–32)
CREAT SERPL-MCNC: 0.48 MG/DL (ref 0.6–1.3)
EOSINOPHIL # BLD AUTO: 0.1 THOUSAND/ΜL (ref 0–0.61)
EOSINOPHIL NFR BLD AUTO: 1 % (ref 0–6)
ERYTHROCYTE [DISTWIDTH] IN BLOOD BY AUTOMATED COUNT: 13.9 % (ref 11.6–15.1)
GFR SERPL CREATININE-BSD FRML MDRD: 87 ML/MIN/1.73SQ M
GLUCOSE SERPL-MCNC: 108 MG/DL (ref 65–140)
HCT VFR BLD AUTO: 38.3 % (ref 34.8–46.1)
HGB BLD-MCNC: 12.9 G/DL (ref 11.5–15.4)
IMM GRANULOCYTES # BLD AUTO: 0.05 THOUSAND/UL (ref 0–0.2)
IMM GRANULOCYTES NFR BLD AUTO: 0 % (ref 0–2)
LYMPHOCYTES # BLD AUTO: 1.37 THOUSANDS/ΜL (ref 0.6–4.47)
LYMPHOCYTES NFR BLD AUTO: 12 % (ref 14–44)
MCH RBC QN AUTO: 28 PG (ref 26.8–34.3)
MCHC RBC AUTO-ENTMCNC: 33.7 G/DL (ref 31.4–37.4)
MCV RBC AUTO: 83 FL (ref 82–98)
MONOCYTES # BLD AUTO: 0.76 THOUSAND/ΜL (ref 0.17–1.22)
MONOCYTES NFR BLD AUTO: 7 % (ref 4–12)
NEUTROPHILS # BLD AUTO: 9.37 THOUSANDS/ΜL (ref 1.85–7.62)
NEUTS SEG NFR BLD AUTO: 79 % (ref 43–75)
NRBC BLD AUTO-RTO: 0 /100 WBCS
PLATELET # BLD AUTO: 286 THOUSANDS/UL (ref 149–390)
PMV BLD AUTO: 8.6 FL (ref 8.9–12.7)
POTASSIUM SERPL-SCNC: 4.2 MMOL/L (ref 3.5–5.3)
PROT SERPL-MCNC: 7.2 G/DL (ref 6.4–8.4)
RBC # BLD AUTO: 4.6 MILLION/UL (ref 3.81–5.12)
SODIUM SERPL-SCNC: 127 MMOL/L (ref 135–147)
WBC # BLD AUTO: 11.71 THOUSAND/UL (ref 4.31–10.16)

## 2022-07-23 PROCEDURE — 99285 EMERGENCY DEPT VISIT HI MDM: CPT | Performed by: EMERGENCY MEDICINE

## 2022-07-23 PROCEDURE — 73030 X-RAY EXAM OF SHOULDER: CPT

## 2022-07-23 PROCEDURE — 99223 1ST HOSP IP/OBS HIGH 75: CPT | Performed by: STUDENT IN AN ORGANIZED HEALTH CARE EDUCATION/TRAINING PROGRAM

## 2022-07-23 PROCEDURE — 99285 EMERGENCY DEPT VISIT HI MDM: CPT

## 2022-07-23 PROCEDURE — 82550 ASSAY OF CK (CPK): CPT | Performed by: EMERGENCY MEDICINE

## 2022-07-23 PROCEDURE — 72125 CT NECK SPINE W/O DYE: CPT

## 2022-07-23 PROCEDURE — 94760 N-INVAS EAR/PLS OXIMETRY 1: CPT

## 2022-07-23 PROCEDURE — 96374 THER/PROPH/DIAG INJ IV PUSH: CPT

## 2022-07-23 PROCEDURE — 36415 COLL VENOUS BLD VENIPUNCTURE: CPT | Performed by: EMERGENCY MEDICINE

## 2022-07-23 PROCEDURE — 85025 COMPLETE CBC W/AUTO DIFF WBC: CPT | Performed by: EMERGENCY MEDICINE

## 2022-07-23 PROCEDURE — 80053 COMPREHEN METABOLIC PANEL: CPT | Performed by: EMERGENCY MEDICINE

## 2022-07-23 PROCEDURE — 70450 CT HEAD/BRAIN W/O DYE: CPT

## 2022-07-23 PROCEDURE — 94664 DEMO&/EVAL PT USE INHALER: CPT

## 2022-07-23 PROCEDURE — G1004 CDSM NDSC: HCPCS

## 2022-07-23 PROCEDURE — 71250 CT THORAX DX C-: CPT

## 2022-07-23 RX ORDER — OXYCODONE HYDROCHLORIDE 5 MG/1
5 TABLET ORAL EVERY 4 HOURS PRN
Status: DISCONTINUED | OUTPATIENT
Start: 2022-07-23 | End: 2022-07-30 | Stop reason: HOSPADM

## 2022-07-23 RX ORDER — LEVOTHYROXINE SODIUM 0.05 MG/1
50 TABLET ORAL DAILY
Status: DISCONTINUED | OUTPATIENT
Start: 2022-07-24 | End: 2022-07-30 | Stop reason: HOSPADM

## 2022-07-23 RX ORDER — LIDOCAINE 50 MG/G
1 PATCH TOPICAL ONCE
Status: COMPLETED | OUTPATIENT
Start: 2022-07-23 | End: 2022-07-24

## 2022-07-23 RX ORDER — AMLODIPINE BESYLATE 2.5 MG/1
2.5 TABLET ORAL DAILY
Status: DISCONTINUED | OUTPATIENT
Start: 2022-07-23 | End: 2022-07-26

## 2022-07-23 RX ORDER — MEMANTINE HYDROCHLORIDE 10 MG/1
10 TABLET ORAL 2 TIMES DAILY
Status: DISCONTINUED | OUTPATIENT
Start: 2022-07-23 | End: 2022-07-30 | Stop reason: HOSPADM

## 2022-07-23 RX ORDER — METHOCARBAMOL 500 MG/1
250 TABLET, FILM COATED ORAL EVERY 6 HOURS SCHEDULED
Status: DISCONTINUED | OUTPATIENT
Start: 2022-07-23 | End: 2022-07-30 | Stop reason: HOSPADM

## 2022-07-23 RX ORDER — FENTANYL CITRATE 50 UG/ML
20 INJECTION, SOLUTION INTRAMUSCULAR; INTRAVENOUS ONCE
Status: COMPLETED | OUTPATIENT
Start: 2022-07-23 | End: 2022-07-23

## 2022-07-23 RX ORDER — ATORVASTATIN CALCIUM 20 MG/1
20 TABLET, FILM COATED ORAL DAILY
Status: DISCONTINUED | OUTPATIENT
Start: 2022-07-23 | End: 2022-07-30 | Stop reason: HOSPADM

## 2022-07-23 RX ORDER — CYANOCOBALAMIN 1000 UG/ML
1000 INJECTION, SOLUTION INTRAMUSCULAR; SUBCUTANEOUS
COMMUNITY

## 2022-07-23 RX ORDER — LEVOTHYROXINE SODIUM 0.05 MG/1
50 TABLET ORAL DAILY
COMMUNITY

## 2022-07-23 RX ORDER — CYANOCOBALAMIN 1000 UG/ML
1000 INJECTION, SOLUTION INTRAMUSCULAR; SUBCUTANEOUS
Status: DISCONTINUED | OUTPATIENT
Start: 2022-07-23 | End: 2022-07-30 | Stop reason: HOSPADM

## 2022-07-23 RX ORDER — PANTOPRAZOLE SODIUM 40 MG/1
40 TABLET, DELAYED RELEASE ORAL DAILY
COMMUNITY

## 2022-07-23 RX ORDER — FUROSEMIDE 20 MG/1
20 TABLET ORAL 2 TIMES DAILY
Status: DISCONTINUED | OUTPATIENT
Start: 2022-07-23 | End: 2022-07-27

## 2022-07-23 RX ORDER — HEPARIN SODIUM 5000 [USP'U]/ML
5000 INJECTION, SOLUTION INTRAVENOUS; SUBCUTANEOUS EVERY 8 HOURS SCHEDULED
Status: COMPLETED | OUTPATIENT
Start: 2022-07-23 | End: 2022-07-24

## 2022-07-23 RX ORDER — ACETAMINOPHEN 325 MG/1
975 TABLET ORAL EVERY 8 HOURS SCHEDULED
Status: DISCONTINUED | OUTPATIENT
Start: 2022-07-23 | End: 2022-07-30 | Stop reason: HOSPADM

## 2022-07-23 RX ORDER — PANTOPRAZOLE SODIUM 40 MG/1
40 TABLET, DELAYED RELEASE ORAL DAILY
Status: DISCONTINUED | OUTPATIENT
Start: 2022-07-23 | End: 2022-07-30 | Stop reason: HOSPADM

## 2022-07-23 RX ORDER — ONDANSETRON 2 MG/ML
4 INJECTION INTRAMUSCULAR; INTRAVENOUS EVERY 4 HOURS PRN
Status: DISCONTINUED | OUTPATIENT
Start: 2022-07-23 | End: 2022-07-30 | Stop reason: HOSPADM

## 2022-07-23 RX ORDER — LIDOCAINE 50 MG/G
2 PATCH TOPICAL DAILY
Status: DISCONTINUED | OUTPATIENT
Start: 2022-07-23 | End: 2022-07-30 | Stop reason: HOSPADM

## 2022-07-23 RX ORDER — DILTIAZEM HYDROCHLORIDE 120 MG/1
120 TABLET, FILM COATED ORAL EVERY 6 HOURS SCHEDULED
COMMUNITY

## 2022-07-23 RX ORDER — ATORVASTATIN CALCIUM 20 MG/1
20 TABLET, FILM COATED ORAL DAILY
COMMUNITY

## 2022-07-23 RX ORDER — FUROSEMIDE 20 MG/1
20 TABLET ORAL 2 TIMES DAILY
COMMUNITY
End: 2022-07-30

## 2022-07-23 RX ORDER — OXYCODONE HYDROCHLORIDE 5 MG/1
2.5 TABLET ORAL EVERY 4 HOURS PRN
Status: DISCONTINUED | OUTPATIENT
Start: 2022-07-23 | End: 2022-07-30 | Stop reason: HOSPADM

## 2022-07-23 RX ORDER — MEMANTINE HYDROCHLORIDE 10 MG/1
10 TABLET ORAL 2 TIMES DAILY
COMMUNITY

## 2022-07-23 RX ORDER — ACETAMINOPHEN 325 MG/1
325 TABLET ORAL ONCE
Status: COMPLETED | OUTPATIENT
Start: 2022-07-23 | End: 2022-07-23

## 2022-07-23 RX ORDER — CLOPIDOGREL BISULFATE 75 MG/1
75 TABLET ORAL DAILY
COMMUNITY

## 2022-07-23 RX ORDER — HYDROMORPHONE HCL IN WATER/PF 6 MG/30 ML
0.2 PATIENT CONTROLLED ANALGESIA SYRINGE INTRAVENOUS EVERY 4 HOURS PRN
Status: DISCONTINUED | OUTPATIENT
Start: 2022-07-23 | End: 2022-07-30 | Stop reason: HOSPADM

## 2022-07-23 RX ORDER — POLYETHYLENE GLYCOL 3350 17 G/17G
17 POWDER, FOR SOLUTION ORAL DAILY PRN
Status: DISCONTINUED | OUTPATIENT
Start: 2022-07-23 | End: 2022-07-30 | Stop reason: HOSPADM

## 2022-07-23 RX ORDER — DOCUSATE SODIUM 100 MG/1
100 CAPSULE, LIQUID FILLED ORAL 2 TIMES DAILY
Status: DISCONTINUED | OUTPATIENT
Start: 2022-07-23 | End: 2022-07-30 | Stop reason: HOSPADM

## 2022-07-23 RX ORDER — MULTIVIT-MIN/FA/LYCOPEN/LUTEIN 500-300MCG
TABLET ORAL
COMMUNITY

## 2022-07-23 RX ORDER — AMLODIPINE BESYLATE 2.5 MG/1
2.5 TABLET ORAL DAILY
COMMUNITY
End: 2022-07-30

## 2022-07-23 RX ORDER — SENNOSIDES 8.6 MG
2 TABLET ORAL DAILY
Status: DISCONTINUED | OUTPATIENT
Start: 2022-07-23 | End: 2022-07-30 | Stop reason: HOSPADM

## 2022-07-23 RX ORDER — DILTIAZEM HYDROCHLORIDE 60 MG/1
120 TABLET, FILM COATED ORAL EVERY 6 HOURS SCHEDULED
Status: DISCONTINUED | OUTPATIENT
Start: 2022-07-23 | End: 2022-07-27

## 2022-07-23 RX ADMIN — METHOCARBAMOL 250 MG: 500 TABLET ORAL at 17:44

## 2022-07-23 RX ADMIN — DICLOFENAC SODIUM TOPICAL GEL, 1%, 2 G: 10 GEL TOPICAL at 22:08

## 2022-07-23 RX ADMIN — MEMANTINE 10 MG: 10 TABLET ORAL at 17:44

## 2022-07-23 RX ADMIN — LIDOCAINE 5% 2 PATCH: 700 PATCH TOPICAL at 17:44

## 2022-07-23 RX ADMIN — FENTANYL CITRATE 20 MCG: 50 INJECTION INTRAMUSCULAR; INTRAVENOUS at 13:56

## 2022-07-23 RX ADMIN — ATORVASTATIN CALCIUM 20 MG: 20 TABLET, FILM COATED ORAL at 17:44

## 2022-07-23 RX ADMIN — ACETAMINOPHEN 975 MG: 325 TABLET ORAL at 17:44

## 2022-07-23 RX ADMIN — PANTOPRAZOLE SODIUM 40 MG: 40 TABLET, DELAYED RELEASE ORAL at 17:44

## 2022-07-23 RX ADMIN — DOCUSATE SODIUM 100 MG: 100 CAPSULE, LIQUID FILLED ORAL at 17:44

## 2022-07-23 RX ADMIN — CYANOCOBALAMIN 1000 MCG: 1000 INJECTION, SOLUTION INTRAMUSCULAR; SUBCUTANEOUS at 17:44

## 2022-07-23 RX ADMIN — HEPARIN SODIUM 5000 UNITS: 5000 INJECTION INTRAVENOUS; SUBCUTANEOUS at 17:44

## 2022-07-23 RX ADMIN — AMLODIPINE BESYLATE 2.5 MG: 2.5 TABLET ORAL at 17:44

## 2022-07-23 RX ADMIN — POLYETHYLENE GLYCOL 3350 17 G: 17 POWDER, FOR SOLUTION ORAL at 17:44

## 2022-07-23 RX ADMIN — FUROSEMIDE 20 MG: 20 TABLET ORAL at 17:44

## 2022-07-23 RX ADMIN — LIDOCAINE 5% 1 PATCH: 700 PATCH TOPICAL at 12:06

## 2022-07-23 RX ADMIN — ACETAMINOPHEN 325 MG: 325 TABLET ORAL at 12:06

## 2022-07-23 RX ADMIN — DILTIAZEM HYDROCHLORIDE 120 MG: 60 TABLET, FILM COATED ORAL at 17:44

## 2022-07-23 RX ADMIN — DICLOFENAC SODIUM TOPICAL GEL, 1%, 2 G: 10 GEL TOPICAL at 12:07

## 2022-07-23 RX ADMIN — SENNOSIDES 17.2 MG: 8.6 TABLET, FILM COATED ORAL at 17:44

## 2022-07-23 NOTE — ED PROVIDER NOTES
History  Chief Complaint   Patient presents with    Fall     Pt fall/slid out of recliner landing on rear  C/o L shoulder pain  Denies head strike  HPI     51-year-old female with history of hypertension, hyperlipidemia, hypothyroidism, dementia, on daily Plavix  Patient presents for evaluation after slipping out of her chair this morning  She reports that she accidentally slipped out of the chair landing on her bottom  She denies hitting her head or loss of consciousness  States that she was sitting on the ground for about 2 hours before someone came into her room and found her  The patient reports pain in her left shoulder, left rib cage beneath her left breast, and left midback  The pain in her left rib cage is new but the pain in her left shoulder and left middle back have been present since her fall 9 days ago  At that time she was evaluated in the emergency department as a trauma alert and had a CT scan of her head which was unremarkable, CT scan of her cervical spine which showed no acute fracture or malalignment, and CT scan of the chest, abdomen, and pelvis which showed an age-indeterminate compression fracture to T8 that was suspected to be chronic based on the appearance  She was discharged back to her skilled nursing facility  States that despite taking Tylenol she is continuing to have pain in her left shoulder and back  The patient ambulates with a walker  Denies chest pain, shortness of breath, or palpitations preceding her fall today  Prior to Admission Medications   Prescriptions Last Dose Informant Patient Reported? Taking?    Diclofenac Sodium (VOLTAREN) 1 %   Yes Yes   Sig: Apply 2 g topically 4 (four) times a day   Multiple Vitamins-Minerals ([employer]) TABS   Yes Yes   Sig: Take by mouth   acetaminophen (TYLENOL) 325 mg tablet   No No   Sig: Take 2 tablets (650 mg total) by mouth every 6 (six) hours as needed for mild pain   amLODIPine (NORVASC) 2 5 mg tablet   Yes Yes Sig: Take 2 5 mg by mouth daily   atorvastatin (LIPITOR) 20 mg tablet   Yes Yes   Sig: Take 20 mg by mouth daily   clopidogrel (PLAVIX) 75 mg tablet   Yes Yes   Sig: Take 75 mg by mouth daily   cyanocobalamin 1,000 mcg/mL   Yes Yes   Sig: Inject 1,000 mcg into a muscle every 30 (thirty) days   diltiazem (CARDIZEM) 120 MG tablet   Yes Yes   Sig: Take 120 mg by mouth every 6 (six) hours   furosemide (LASIX) 20 mg tablet   Yes Yes   Sig: Take 20 mg by mouth 2 (two) times a day   glucose blood test strip   Yes Yes   Sig: Use 1 each daily as needed Use as instructed   levothyroxine 50 mcg tablet   Yes Yes   Sig: Take 50 mcg by mouth daily   memantine (NAMENDA) 10 mg tablet   Yes Yes   Sig: Take 10 mg by mouth 2 (two) times a day   pantoprazole (PROTONIX) 40 mg tablet   Yes Yes   Sig: Take 40 mg by mouth daily      Facility-Administered Medications: None       Past Medical History:   Diagnosis Date    [facility]est[facility]     Dementia without behavioral disturbance (HCC)     High cholesterol     Hypertension     Hypothyroid     Stage 3 chronic kidney disease, unspecified whether stage 3a or 3b [facility] ([facility] )     Stroke ([facility] ) 09/21/2021       Past Surgical History:   Procedure Laterality Date    APPENDECTOMY      CHOLECYSTECTOMY      NEPHRECTOMY Left     TOTAL ABDOMINAL HYSTERECTOMY W/ BILATERAL SALPINGOOPHORECTOMY         No family history on file  I have reviewed and agree with the history as documented  E-Cigarette/Vaping    E-Cigarette Use Never User      E-Cigarette/Vaping Substances     Social History     Tobacco Use    Smoking status: Never Smoker    Smokeless tobacco: Never Used   Vaping Use    Vaping Use: Never used   Substance Use Topics    Alcohol use: Not Currently    Drug use: Never       Review of Systems   Constitutional: Negative for chills and fever  HENT: Negative for congestion  Eyes: Negative for visual disturbance     Respiratory: Negative for cough and shortness of breath  Cardiovascular: Positive for chest pain (ribcage under L breast)  Negative for leg swelling  Gastrointestinal: Negative for abdominal pain, diarrhea, nausea and vomiting  Genitourinary: Negative for flank pain  Musculoskeletal: Positive for arthralgias (L shoulder), back pain (thoracic spine) and neck pain  Negative for neck stiffness  Skin: Negative for rash and wound  Neurological: Negative for dizziness, speech difficulty, weakness, light-headedness, numbness and headaches  Psychiatric/Behavioral: Negative for agitation, behavioral problems and confusion  Physical Exam  Physical Exam  Constitutional:       General: She is not in acute distress  Appearance: She is well-developed  She is not diaphoretic  HENT:      Head: Normocephalic and atraumatic  Right Ear: Tympanic membrane and external ear normal       Left Ear: Tympanic membrane and external ear normal       Ears:      Comments: No hemotympanum bilaterally     Nose: Nose normal    Eyes:      Extraocular Movements: Extraocular movements intact  Conjunctiva/sclera: Conjunctivae normal       Pupils: Pupils are equal, round, and reactive to light  Cardiovascular:      Rate and Rhythm: Regular rhythm  Tachycardia present  Pulses: Normal pulses  Heart sounds: Normal heart sounds  No murmur heard  No friction rub  No gallop  Comments:   Pulmonary:      Effort: Pulmonary effort is normal  No respiratory distress  Breath sounds: Normal breath sounds  No wheezing or rales  Chest:      Chest wall: Tenderness (Tenderness to palpation to the left anterior chest wall beneath the left breast   No ecchymosis, crepitus, or deformity) present  Abdominal:      General: Bowel sounds are normal  There is no distension  Palpations: Abdomen is soft  Tenderness: There is no abdominal tenderness  There is no guarding        Comments: Abdomen benign to deep palpation   Musculoskeletal: General: No deformity  Normal range of motion  Cervical back: Normal range of motion and neck supple  Comments: Midline tenderness to palpation over the middle of the cervical spine and over the mid to lower thoracic spine  Extremities are visibly atraumatic the patient reports tenderness to palpation over the left humeral head with pain with full range of motion of the left shoulder  Shoulder appears appropriately located and she has full range of motion of the shoulder, elbow, and wrist   Full range of motion of the bilateral hips, knees, and ankles without apparent pain  Skin:     General: Skin is warm and dry  Neurological:      Mental Status: She is alert and oriented to person, place, and time  Motor: No abnormal muscle tone  Comments: 5/5 strength in the bilateral upper and lower extremities with intact sensation to light touch  Cranial nerves intact  Clear speech     Psychiatric:         Mood and Affect: Mood normal          Vital Signs  ED Triage Vitals   Temperature Pulse Respirations Blood Pressure SpO2   07/23/22 1046 07/23/22 1046 07/23/22 1046 07/23/22 1046 07/23/22 1046   97 9 °F (36 6 °C) 103 18 155/82 100 %      Temp Source Heart Rate Source Patient Position - Orthostatic VS BP Location FiO2 (%)   07/23/22 1046 07/23/22 1046 07/23/22 1046 07/23/22 1046 --   Oral Monitor Lying Right arm       Pain Score       07/23/22 1356       4           Vitals:    07/23/22 1046 07/23/22 1207 07/23/22 1357 07/23/22 1609   BP: 155/82 163/71 159/70    Pulse: 103 102 87 85   Patient Position - Orthostatic VS: Lying Sitting Lying          Visual Acuity      ED Medications  Medications   Diclofenac Sodium (VOLTAREN) 1 % topical gel 2 g (2 g Topical Given 7/23/22 1207)   lidocaine (LIDODERM) 5 % patch 1 patch (1 patch Topical Medication Applied 7/23/22 1206)   amLODIPine (NORVASC) tablet 2 5 mg (has no administration in time range)   atorvastatin (LIPITOR) tablet 20 mg (has no administration in time range)   cyanocobalamin injection 1,000 mcg (has no administration in time range)   diltiazem (CARDIZEM) tablet 120 mg (has no administration in time range)   furosemide (LASIX) tablet 20 mg (has no administration in time range)   levothyroxine tablet 50 mcg (has no administration in time range)   memantine (NAMENDA) tablet 10 mg (has no administration in time range)   pantoprazole (PROTONIX) EC tablet 40 mg (has no administration in time range)   senna (SENOKOT) tablet 17 2 mg (has no administration in time range)   docusate sodium (COLACE) capsule 100 mg (has no administration in time range)   polyethylene glycol (MIRALAX) packet 17 g (has no administration in time range)   heparin (porcine) subcutaneous injection 5,000 Units (has no administration in time range)   acetaminophen (TYLENOL) tablet 975 mg (has no administration in time range)   lidocaine (LIDODERM) 5 % patch 2 patch (has no administration in time range)   oxyCODONE (ROXICODONE) IR tablet 2 5 mg (has no administration in time range)   HYDROmorphone HCl (DILAUDID) injection 0 2 mg (has no administration in time range)   oxyCODONE (ROXICODONE) IR tablet 5 mg (has no administration in time range)   naloxone (NARCAN) 0 04 mg/mL syringe 0 04 mg (has no administration in time range)   ondansetron (ZOFRAN) injection 4 mg (has no administration in time range)   methocarbamol (ROBAXIN) tablet 250 mg (has no administration in time range)   acetaminophen (TYLENOL) tablet 325 mg (325 mg Oral Given 7/23/22 1206)   fentanyl citrate (PF) 100 MCG/2ML 20 mcg (20 mcg Intravenous Given 7/23/22 1356)       Diagnostic Studies  Results Reviewed     Procedure Component Value Units Date/Time    Comprehensive metabolic panel [[id number]]  (Abnormal) Collected: 07/23/22 1205    Lab Status: Final result Specimen: Blood from Arm, Left Updated: 07/23/22 1432     Sodium 127 mmol/L      Potassium 4 2 mmol/L      Chloride 89 mmol/L      CO2 29 mmol/L      ANION GAP 9 mmol/L BUN 7 mg/dL      Creatinine 0 48 mg/dL      Glucose 108 mg/dL      Calcium 9 2 mg/dL      AST 21 U/L      ALT 15 U/L      Alkaline Phosphatase 137 U/L      Total Protein 7 2 g/dL      Albumin 3 9 g/dL      Total Bilirubin 0 52 mg/dL      eGFR 87 ml/min/1 73sq m     Narrative:      National Kidney Disease Foundation guidelines for Chronic Kidney Disease (CKD):     Stage 1 with normal or high GFR (GFR > 90 mL/min/1 73 square meters)    Stage 2 Mild CKD (GFR = 60-89 mL/min/1 73 square meters)    Stage 3A Moderate CKD (GFR = 45-59 mL/min/1 73 square meters)    Stage 3B Moderate CKD (GFR = 30-44 mL/min/1 73 square meters)    Stage 4 Severe CKD (GFR = 15-29 mL/min/1 73 square meters)    Stage 5 End Stage CKD (GFR <15 mL/min/1 73 square meters)  Note: GFR calculation is accurate only with a steady state creatinine    CK (with reflex to MB) [[id number]]  (Normal) Collected: 07/23/22 1205    Lab Status: Final result Specimen: Blood from Arm, Left Updated: 07/23/22 1432     Total CK 47 U/L     CBC and differential [[id number]]  (Abnormal) Collected: 07/23/22 1205    Lab Status: Final result Specimen: Blood from Arm, Left Updated: 07/23/22 1229     WBC 11 71 Thousand/uL      RBC 4 60 Million/uL      Hemoglobin 12 9 g/dL      Hematocrit 38 3 %      MCV 83 fL      MCH 28 0 pg      MCHC 33 7 g/dL      RDW 13 9 %      MPV 8 6 fL      Platelets 955 Thousands/uL      nRBC 0 /100 WBCs      Neutrophils Relative 79 %      Immat GRANS % 0 %      Lymphocytes Relative 12 %      Monocytes Relative 7 %      Eosinophils Relative 1 %      Basophils Relative 1 %      Neutrophils Absolute 9 37 Thousands/µL      Immature Grans Absolute 0 05 Thousand/uL      Lymphocytes Absolute 1 37 Thousands/µL      Monocytes Absolute 0 76 Thousand/µL      Eosinophils Absolute 0 10 Thousand/µL      Basophils Absolute 0 06 Thousands/µL                  XR shoulder 2+ views LEFT   Final Result by Yuan Devries MD (07/23 1228)      No acute osseous abnormality  Deformity of the left humeral head consistent with old trauma  Workstation performed: [id number]         CT head without contrast   Final Result by Madelin Opitz, DO (07/23 1208)   1  Somewhat limited examination due to patient motion artifact  2   Stable cerebral atrophy with 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  No acute intracranial abnormality  If there is continued concern for acute intracranial injury, consider follow-up neurology consultation and/or MRI of the brain with T2 gradient echo weighted sequencing and FLAIR weighted sequencing  Workstation performed: [id number]         CT spine cervical without contrast   Final Result by Madelin Opitz, DO (07/23 1213)   Stable degenerative changes of the cervical spine  No acute cervical spine fracture or traumatic malalignment  Workstation performed: [id number]         CT chest without contrast   Final Result by Madelin Opitz, DO (07/23 1235)   1  Acute fractures of the left 5th and 7th ribs  No evidence of pneumothorax  2   Mild compression fracture T3, progressed from the prior study  3   Additional, stable incidental findings as described above  If warranted, consider follow-up trauma surgery and/or neuro surgery consultation  The study was marked in [facility] for immediate notification  Workstation performed: [id number]         CT recon only thoracic spine   Final Result by Madelin Opitz, DO (07/23 1238)   1  Progression of mild compression fracture T3 vertebral body  2   Stable compression fractures of T4 and T9 vertebral bodies                 Workstation performed: [id number]         XR chest pa & lateral    (Results Pending)              Procedures  Procedures         ED Course  ED Course as of 07/23/22 1725   Sat Jul 23, 2022   1212 CT is limited by motion artifact there is no clear evidence of acute intracranial injury  The patient denies headache or head strike and has a normal neurologic exam so my suspicion for intracranial injury is very low  No indication for repeat imaging at this time  1220 CT of the cervical spine with stable degenerative changes  1447 Patient with new fractures to left ribs 5 and 7 and a new T3 compression fracture  Discussed with Dr Rooney Monday with trauma and will admit for further management  Fentanyl given for pain control  MDM  Number of Diagnoses or Management Options  Compression fracture of T3 vertebra, initial encounter [facility]): new and requires workup  Rib fracture: new and requires workup  Diagnosis management comments: Please see ED course above for details of the Medical Decision Making  Amount and/or Complexity of Data Reviewed  Clinical lab tests: ordered and reviewed  Tests in the radiology section of CPT®: ordered and reviewed  Obtain history from someone other than the patient: yes  Review and summarize past medical records: yes  Discuss the patient with other providers: yes    Patient Progress  Patient progress: stable      Disposition  Final diagnoses:   Rib fracture   Compression fracture of T3 vertebra, initial encounter [facility])     Time reflects when diagnosis was documented in both MDM as applicable and the Disposition within this note     Time User Action Codes Description Comment    7/23/2022  2:05 PM Trevin Augustine Add [S22 039A] Closed fracture of third thoracic vertebra, unspecified fracture morphology, initial encounter ([facility] )     7/23/2022  2:05 PM Trevin Dunaway [facility] [place]  [address]] Fall, initial encounter     7/23/2022  2:08 PM Trevin Augustine Add [S22 42XA] Closed fracture of multiple ribs of left side, initial encounter     7/23/2022  3:32 PM Gerald Cohen Modify [S22 039A] Closed fracture of third thoracic vertebra, unspecified fracture morphology, initial encounter ([facility] )     7/23/2022  3:32 PM Padilla Real [W19 XXXA] Fall, initial encounter     7/23/2022  3:32 PM Rosa M Moreno Modify [S22 42XA] Closed fracture of multiple ribs of left side, initial encounter     7/23/2022  5:21 PM Hanh Robles Add [S22 39XA] Rib fracture     7/23/2022  5:21 PM Hanh Robles Add [L27 239A] Compression fracture of T3 vertebra, initial encounter [facility])       ED Disposition     ED Disposition   Admit    Condition   Stable    Date/Time   Sat Jul 23, 2022  5:21 PM    Comment   Case was discussed with ALICJA and the patient's admission status was agreed to be Admission Status: inpatient status to the service of Dr Yahir Major  Follow-up Information    None         Current Discharge Medication List      CONTINUE these medications which have NOT CHANGED    Details   amLODIPine (NORVASC) 2 5 mg tablet Take 2 5 mg by mouth daily      atorvastatin (LIPITOR) 20 mg tablet Take 20 mg by mouth daily      clopidogrel (PLAVIX) 75 mg tablet Take 75 mg by mouth daily      cyanocobalamin 1,000 mcg/mL Inject 1,000 mcg into a muscle every 30 (thirty) days      Diclofenac Sodium (VOLTAREN) 1 % Apply 2 g topically 4 (four) times a day      diltiazem (CARDIZEM) 120 MG tablet Take 120 mg by mouth every 6 (six) hours      furosemide (LASIX) 20 mg tablet Take 20 mg by mouth 2 (two) times a day      glucose blood test strip Use 1 each daily as needed Use as instructed      levothyroxine 50 mcg tablet Take 50 mcg by mouth daily      memantine (NAMENDA) 10 mg tablet Take 10 mg by mouth 2 (two) times a day      Multiple Vitamins-Minerals ([employer]) TABS Take by mouth      pantoprazole (PROTONIX) 40 mg tablet Take 40 mg by mouth daily      acetaminophen (TYLENOL) 325 mg tablet Take 2 tablets (650 mg total) by mouth every 6 (six) hours as needed for mild pain  Qty: 30 tablet, Refills: 0    Associated Diagnoses: Fall, initial encounter             No discharge procedures on file      PDMP Review     None          ED Provider  Electronically Signed by Aarti Tenorio MD  07/23/22 5567

## 2022-07-23 NOTE — ASSESSMENT & PLAN NOTE
- Chronic history of hypertension   - Continue home medication regimen as indicated  - Outpatient follow-up per routine

## 2022-07-23 NOTE — ASSESSMENT & PLAN NOTE
- Mild T3 compression fracture, present on admission, with progression when compared to prior imaging   - Await Neurosurgery evaluation and recommendations   - Bracing: Maintain TLSO brace whenever upright >45 degrees and/or out of bed  - Spine precautions  - Monitor neurovascular exam   - Multimodal analgesic regimen as needed  - Upright spine x-rays pending placement/application of TLSO brace   - PT and OT evaluation and treatment as indicated  - Outpatient follow up with Neurosurgery for re-evaluation

## 2022-07-23 NOTE — H&P
[address] 1933, 80 y o  female MRN: [id number]  Unit/Bed#: S -01 Encounter: [id number]  Primary Care Provider: Curtis Kelly MD   Date and time admitted to hospital: 7/23/2022 10:43 AM    Fall  Assessment & Plan  - Status post fall with the below noted injuries  - Fall precautions  - Geriatric Medicine consultation for evaluation, medication review and recommendations   - PT and OT evaluation and treatment as indicated  - Case Management consultation for disposition planning  * Closed fracture of multiple ribs of left side  Assessment & Plan  - Multiple left-sided rib fractures (5 & 7), present on admission   - Continue rib fracture protocol   - Continue to encourage incentive spirometer use and adequate pulmonary hygiene  Currently pulling 500 mL on I S   - PIC score is 5   - Continue multimodal analgesic regimen  Appreciate APS evaluation and recommendations   - Supplemental oxygen via nasal cannula as needed to maintain saturations greater than or equal to 94%  - Repeat chest x-ray on 7/24/2022    - PT and OT evaluation and treatment as indicated  - Outpatient follow-up in the trauma clinic for re-evaluation in approximately 2 weeks  Closed wedge compression fracture of T3 vertebra (HCC)  Assessment & Plan  - Mild T3 compression fracture, present on admission, with progression when compared to prior imaging   - Await Neurosurgery evaluation and recommendations   - Bracing: Maintain TLSO brace whenever upright >45 degrees and/or out of bed  - Spine precautions  - Monitor neurovascular exam   - Multimodal analgesic regimen as needed  - Upright spine x-rays pending placement/application of TLSO brace   - PT and OT evaluation and treatment as indicated  - Outpatient follow up with Neurosurgery for re-evaluation      Hyponatremia  Assessment & Plan  - Significant hyponatremia, present on presentation, with sodium of 127   - Unclear etiology, possibly secondary to dehydration   - Administer IV fluids and encourage adequate oral intake  - Continue to monitor sodium with repeat BMP in AM or sooner if necessary   - Further workup of hyponatremia if sodium worsens or fails to improve  CKD (chronic kidney disease) stage 3, GFR 30-59 ml/min ([facility])  Assessment & Plan  No results found for: EGFR, CREATININE     - Patient with history of CKD stage 3 and baseline creatinine around 0 9  Patient follows with [facility] as outpatient  - Patient also known to 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   - No evidence of acute kidney injury at this time  - Monitor volume status and renal function with BMP  - Avoid nephrotoxic medications and hypotension   - Outpatient follow-up per routine  Chronic congestive heart failure (HCC)  Assessment & Plan  Wt Readings from Last 3 Encounters:   07/14/22 85 1 kg (187 lb 9 8 oz)     - Patient with chronic history of CHF without evidence of acute exacerbation   - Continue home medication regimen  - Monitor volume status   - Outpatient follow-up per routine  HTN (hypertension)  Assessment & Plan  - Chronic history of hypertension   - Continue home medication regimen as indicated  - Outpatient follow-up per routine  Hypothyroid  Assessment & Plan  - Patient with chronic history of hypothyroidism   - Continue home medication regimen   - Outpatient follow-up per routine  Trauma Alert: Evaluation; trauma team notified at 12:51 PM via text patient initially seen by the trauma service at 1:13 PM   Model of Arrival: Ambulance    Trauma Team: Attending Dr Lafonda Phoenix and URMILA Montejo PA-C  Consultants: APS and Geriatric Medicine both consulted routinely with orders placed in EPIC  History of Present Illness     Chief Complaint:  I hurt all over    Mechanism:Fall, Other:  Patient had recent fall within the last week and had recurrent fall/light out recliner today        HPI: Waldo Hall is a 80 y o  female who presents with generalized pain and soreness, most specifically in her left chest wall and left shoulder however  She notes that she fell approximately 1 week ago, but her ER workup was relatively unremarkable and she was able to be discharged without prolonged hospital stay or interventions  Today, she was in her recliner on a fleese blanket when she slipped to the floor  She was unable to get up  Review of Systems   Constitutional: Positive for activity change (Decreased activity over the last week secondary to weakness)  Negative for appetite change, chills, diaphoresis and fever  HENT: Negative  Negative for ear pain and facial swelling  Eyes: Negative  Negative for photophobia, pain and visual disturbance  Respiratory: Negative  Negative for cough, chest tightness, shortness of breath and wheezing  Cardiovascular: Positive for chest pain (Left chest wall pain)  Negative for palpitations and leg swelling  Gastrointestinal: Negative  Negative for abdominal distention, abdominal pain, nausea and vomiting  Endocrine: Negative  Genitourinary: Negative  Negative for difficulty urinating, dysuria, flank pain, frequency and hematuria  Musculoskeletal: Positive for arthralgias (Left shoulder and upper arm pain) and back pain (Mid and upper back pain)  Negative for myalgias and neck pain  Skin: Positive for wound (Left shin wounds from dog scratch)  Negative for color change, pallor and rash  Allergic/Immunologic: Negative  Neurological: Positive for weakness (Generalized weakness and week when trying to stand)  Negative for dizziness, syncope, light-headedness, numbness and headaches  Hematological: Negative  Psychiatric/Behavioral: Negative  Negative for agitation, confusion and self-injury  The patient is not nervous/anxious  12-point, complete review of systems was reviewed and negative except as stated above       Historical Information     Past Medical History:   Diagnosis Date    CHF (congestive heart failure) (HCC)     Dementia without behavioral disturbance (HCC)     High cholesterol     Hypertension     Hypothyroid     Stage 3 chronic kidney disease, unspecified whether stage 3a or 3b CKD ([facility] [address] )     Stroke ([facility] ) 09/21/2021     Past Surgical History:   Procedure Laterality Date    APPENDECTOMY      CHOLECYSTECTOMY      NEPHRECTOMY Left     TOTAL ABDOMINAL HYSTERECTOMY W/ BILATERAL SALPINGOOPHORECTOMY          Social History     Tobacco Use    Smoking status: Never Smoker    Smokeless tobacco: Never Used   Vaping Use    Vaping Use: Never used   Substance Use Topics    Alcohol use: Not Currently    Drug use: Never       There is no immunization history on file for this patient  Last Tetanus:  Unclear  Family History: Non-contributory    1  Before the illness or injury that brought you to the Emergency, did you need someone to help you on a regular basis? 1=Yes   2  Since the illness or injury that brought you to the Emergency, have you needed more help than usual to take care of yourself? 1=Yes   3  Have you been hospitalized for one or more nights during the past 6 months (excluding a stay in the Emergency Department)? 1=Yes   4  In general, do you see well? 0=Yes   5  In general, do you have serious problems with your memory? 0=No   6  Do you take more than three different medications everyday?  1=Yes   TOTAL   4     Did you order a geriatric consult if the score was 2 or greater?: yes     Meds/Allergies   all current active meds have been reviewed and current meds:   Current Facility-Administered Medications   Medication Dose Route Frequency    Diclofenac Sodium (VOLTAREN) 1 % topical gel 2 g  2 g Topical 4x Daily    lidocaine (LIDODERM) 5 % patch 1 patch  1 patch Topical Once        Allergies   Allergen Reactions    Penicillins Hives       Objective   Initial Vitals:   Temperature: 97 9 °F (36 6 °C) (07/23/22 1046)  Pulse: 103 (07/23/22 1046)  Respirations: 18 (07/23/22 1046)  Blood Pressure: 155/82 (07/23/22 1046)    Primary Survey:   Airway:        Status: patent;        Pre-hospital Interventions: none        Hospital Interventions: none  Breathing:        Pre-hospital Interventions: none       Effort: normal       Right breath sounds: normal       Left breath sounds: normal  Circulation:        Rhythm: regular       Rate: regular   Right Pulses Left Pulses    R radial: 2+  R femoral: 2+  R pedal: 2+  R carotid: 2+  R popliteal: 2+ L radial: 2+  L femoral: 2+  L pedal: 2+  L carotid: 2+  L popliteal: 2+   Disability:        GCS: Eye: 4; Verbal: 5 Motor: 6 Total: 15       Right Pupil: round;  reactive         Left Pupil:  round;  reactive      R Motor Strength L Motor Strength    R : 5/5  R dorsiflex: 5/5  R plantarflex: 5/5 L : 5/5  L dorsiflex: 5/5  L plantarflex: 5/5        Sensory:  No sensory deficit  Exposure:       Completed: Yes      Secondary Survey:  Physical Exam  Vitals and nursing note reviewed  Exam conducted with a chaperone present  Constitutional:       General: She is awake  She is not in acute distress  Appearance: She is obese  She is ill-appearing  She is not toxic-appearing or diaphoretic  Interventions: She is not intubated  HENT:      Head: Normocephalic and atraumatic  No abrasion, contusion or laceration  Jaw: There is normal jaw occlusion  Right Ear: Hearing and external ear normal  No swelling or tenderness  Left Ear: Hearing and external ear normal  No swelling or tenderness  Nose: Nose normal  No nasal deformity, septal deviation, signs of injury, laceration or nasal tenderness  Mouth/Throat:      Mouth: Mucous membranes are moist       Pharynx: Oropharynx is clear  Eyes:      General: Lids are normal  Vision grossly intact  Extraocular Movements: Extraocular movements intact        Conjunctiva/sclera: Conjunctivae normal       Pupils: Pupils are equal, round, and reactive to light  Cardiovascular:      Rate and Rhythm: Normal rate and regular rhythm  Pulses: Normal pulses  Carotid pulses are 2+ on the right side and 2+ on the left side  Radial pulses are 2+ on the right side and 2+ on the left side  Femoral pulses are 2+ on the right side and 2+ on the left side  Dorsalis pedis pulses are 2+ on the right side and 2+ on the left side  Heart sounds: Normal heart sounds  No murmur heard  No friction rub  No gallop  Pulmonary:      Effort: Pulmonary effort is normal  No tachypnea, bradypnea, accessory muscle usage, prolonged expiration, respiratory distress or retractions  She is not intubated  Breath sounds: Normal breath sounds and air entry  No stridor or decreased air movement  No decreased breath sounds, wheezing, rhonchi or rales  Comments: On 2 L of oxygen via nasal cannula; this is her baseline oxygen use at home  Chest:      Chest wall: Tenderness (Mild to moderate left anterior and lateral chest wall tenderness, worst under the left axilla) present  No lacerations, deformity, swelling or crepitus  Abdominal:      General: Abdomen is flat  Bowel sounds are normal  There is no distension  Palpations: Abdomen is soft  Tenderness: There is no abdominal tenderness  There is no guarding or rebound  Musculoskeletal:         General: No swelling, deformity or signs of injury  Left shoulder: Tenderness present  No swelling or deformity  Decreased range of motion  Left upper arm: Tenderness present  No swelling, deformity or lacerations  Cervical back: Neck supple  No swelling, deformity, lacerations or tenderness  No spinous process tenderness or muscular tenderness  Thoracic back: Tenderness present  No swelling, deformity or lacerations  Lumbar back: Tenderness present  No swelling, deformity or lacerations  Right lower leg: No edema        Left lower leg: No edema  Comments: No midline cervical spine tenderness, step-offs or deformities  No paraspinal muscular tenderness in the neck  There was moderate upper to midthoracic spine tenderness with associated paraspinal muscular tenderness  Also mild lumbar spine tenderness  No step-offs or deformities or paraspinal muscular tenderness otherwise  Normal range of motion in the right upper and bilateral lower extremities without pain, tenderness or deformity  There was some tenderness over the left shoulder and proximal upper arm with associated mild pain with range of motion in the left shoulder with intact neurovascular exam and unremarkable remaining left upper extremity  Skin:     General: Skin is warm and dry  Capillary Refill: Capillary refill takes less than 2 seconds  Coloration: Skin is not jaundiced or pale  Findings: Abrasion present  No bruising, ecchymosis, erythema, laceration, lesion or rash  Neurological:      General: No focal deficit present  Mental Status: She is alert and oriented to person, place, and time  Mental status is at baseline  GCS: GCS eye subscore is 4  GCS verbal subscore is 5  GCS motor subscore is 6  Sensory: Sensation is intact  No sensory deficit  Motor: Motor function is intact  No weakness  Psychiatric:         Mood and Affect: Mood normal          Behavior: Behavior is cooperative           Invasive Devices  Report    Peripheral Intravenous Line  Duration           Peripheral IV 07/23/22 Left Antecubital <1 day          Drain  Duration           External Urinary Catheter <1 day              Lab Results:   Results: I have personally reviewed all pertinent laboratory/tests results, BMP/CMP:   Lab Results   Component Value Date    SODIUM 127 (L) 07/23/2022    K 4 2 07/23/2022    CL 89 (L) 07/23/2022    CO2 29 07/23/2022    BUN 7 07/23/2022    CREATININE 0 48 (L) 07/23/2022    CALCIUM 9 2 07/23/2022    AST 21 07/23/2022    ALT 15 07/23/2022    ALKPHOS 137 (H) 07/23/2022    EGFR 87 07/23/2022   , CBC:   Lab Results   Component Value Date    WBC 11 71 (H) 07/23/2022    HGB 12 9 07/23/2022    HCT 38 3 07/23/2022    MCV 83 07/23/2022     07/23/2022    MCH 28 0 07/23/2022    MCHC 33 7 07/23/2022    RDW 13 9 07/23/2022    MPV 8 6 (L) 07/23/2022    NRBC 0 07/23/2022   , Coagulation: No results found for: PT, INR, APTT, Troponin: No results found for: TROPONINI and ISTAT: No components found for: VBG    Imaging Results: I have personally reviewed pertinent reports  and I have personally reviewed pertinent films in PACS  Chest Xray(s): N/A   FAST exam(s): N/A   CT Scan(s): positive for acute findings: T3 fracture and left 5th and 7th rib fractures   Additional Xray(s): negative for acute findings, Left shoulder x-ray     Other Studies: N/A    Code Status: No Order  Advance Directive and Living Will:      Power of :    POLST:    I have spent 50 minutes with Patient and family today in which greater than 50% of this time was spent in counseling/coordination of care regarding Diagnostic results, Intructions for management, Patient and family education and Impressions

## 2022-07-23 NOTE — ASSESSMENT & PLAN NOTE
- Multiple left-sided rib fractures (5 & 7), present on admission   - Continue rib fracture protocol   - Continue to encourage incentive spirometer use and adequate pulmonary hygiene  Currently pulling 500 mL on I S   - PIC score is 5   - Continue multimodal analgesic regimen  Appreciate APS evaluation and recommendations   - Supplemental oxygen via nasal cannula as needed to maintain saturations greater than or equal to 94%  - Repeat chest x-ray on 7/24/2022    - PT and OT evaluation and treatment as indicated  - Outpatient follow-up in the trauma clinic for re-evaluation in approximately 2 weeks

## 2022-07-23 NOTE — ASSESSMENT & PLAN NOTE
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

## 2022-07-23 NOTE — ASSESSMENT & PLAN NOTE
- Significant hyponatremia, present on presentation, with sodium of 127   - Unclear etiology, possibly secondary to dehydration   - Administer IV fluids and encourage adequate oral intake  - Continue to monitor sodium with repeat BMP in AM or sooner if necessary   - Further workup of hyponatremia if sodium worsens or fails to improve

## 2022-07-23 NOTE — ASSESSMENT & PLAN NOTE
Wt Readings from Last 3 Encounters:   07/14/22 85 1 kg (187 lb 9 8 oz)     - Patient with chronic history of CHF without evidence of acute exacerbation   - Continue home medication regimen  - Monitor volume status   - Outpatient follow-up per routine

## 2022-07-23 NOTE — RESPIRATORY THERAPY NOTE
RT Protocol Note  Cliff Flores 80 y o  female MRN: [id number]  Unit/Bed#: S -01 Encounter: [id number]    Assessment    Principal Problem:    Closed fracture of multiple ribs of left side  Active Problems:    Fall    Closed wedge compression fracture of T3 vertebra (HCC)    HTN (hypertension)    CKD (chronic kidney disease) stage 3, GFR 30-59 ml/min (HCC)    Chronic congestive heart failure (HCC)    Hypothyroid    Hyponatremia      Home Pulmonary Medications: None    Home Devices/Therapy: Home O2 (2L)    Past Medical History:   Diagnosis Date    CHF (congestive heart failure) (HCC)     Dementia without behavioral disturbance (HCC)     High cholesterol     Hypertension     Hypothyroid     Stage 3 chronic kidney disease, unspecified whether stage 3a or 3b CKD ([facility] [address] )     Stroke ([facility] ) 09/21/2021     Social History     Socioeconomic History    Marital status:       Spouse name: Not on file    Number of children: Not on file    Years of education: Not on file    Highest education level: Not on file   Occupational History    Not on file   Tobacco Use    Smoking status: Never Smoker    Smokeless tobacco: Never Used   Vaping Use    Vaping Use: Never used   Substance and Sexual Activity    Alcohol use: Not Currently    Drug use: Never    Sexual activity: Not Currently   Other Topics Concern    Not on file   Social History Narrative    Not on file     Social Determinants of Health     Financial Resource Strain: Not on file   Food Insecurity: Not on file   Transportation Needs: Not on file   Physical Activity: Not on file   Stress: Not on file   Social Connections: Not on file   Intimate Partner Violence: Not on file   Housing Stability: Not on file       Subjective         Objective    Physical Exam:   Assessment Type: Assess only  General Appearance: Alert, Awake  Respiratory Pattern: Normal  Chest Assessment: Chest expansion symmetrical  Bilateral Breath Sounds: Crackles (inspiratory)  Cough: None  O2 Device: 2L NC    Vitals:  Blood pressure 159/70, pulse 85, temperature 97 9 °F (36 6 °C), temperature source Oral, resp  rate 20, SpO2 98 %  Imaging and other studies: I have personally reviewed pertinent reports        O2 Device: 2L NC     Plan       Airway Clearance Plan: Incentive Spirometer     Resp Comments: Patient states that her height is 5'2''         07/23/22 1609   Incentive Spirometry Tx   IS level of assistance Assisted by respiratory care provider;Needs encouragement   Frequency q1hr W/A   Respiratory Effort Normal   Treatment Tolerance Tolerated well   Incentive Spirometry Goal (mL) 751 5 mL  (IBW 50 1kg x 15ml)   Incentive Spirometry Achieved (mL) 500 mL

## 2022-07-23 NOTE — ASSESSMENT & PLAN NOTE
- Patient with chronic history of hypothyroidism   - Continue home medication regimen   - Outpatient follow-up per routine

## 2022-07-24 ENCOUNTER — APPOINTMENT (INPATIENT)
Dept: RADIOLOGY | Facility: HOSPITAL | Age: 87
DRG: 184 | End: 2022-07-24
Payer: COMMERCIAL

## 2022-07-24 LAB
ANION GAP SERPL CALCULATED.3IONS-SCNC: 4 MMOL/L (ref 4–13)
ANION GAP SERPL CALCULATED.3IONS-SCNC: 7 MMOL/L (ref 4–13)
APTT PPP: 36 SECONDS (ref 23–37)
BUN SERPL-MCNC: 11 MG/DL (ref 5–25)
BUN SERPL-MCNC: 13 MG/DL (ref 5–25)
CALCIUM SERPL-MCNC: 8.7 MG/DL (ref 8.4–10.2)
CALCIUM SERPL-MCNC: 8.9 MG/DL (ref 8.4–10.2)
CHLORIDE SERPL-SCNC: 88 MMOL/L (ref 96–108)
CHLORIDE SERPL-SCNC: 91 MMOL/L (ref 96–108)
CO2 SERPL-SCNC: 29 MMOL/L (ref 21–32)
CO2 SERPL-SCNC: 32 MMOL/L (ref 21–32)
CREAT SERPL-MCNC: 0.68 MG/DL (ref 0.6–1.3)
CREAT SERPL-MCNC: 0.75 MG/DL (ref 0.6–1.3)
ERYTHROCYTE [DISTWIDTH] IN BLOOD BY AUTOMATED COUNT: 14 % (ref 11.6–15.1)
GFR SERPL CREATININE-BSD FRML MDRD: 70 ML/MIN/1.73SQ M
GFR SERPL CREATININE-BSD FRML MDRD: 77 ML/MIN/1.73SQ M
GLUCOSE SERPL-MCNC: 124 MG/DL (ref 65–140)
GLUCOSE SERPL-MCNC: 94 MG/DL (ref 65–140)
HCT VFR BLD AUTO: 32.5 % (ref 34.8–46.1)
HGB BLD-MCNC: 10.7 G/DL (ref 11.5–15.4)
INR PPP: 0.99 (ref 0.84–1.19)
MCH RBC QN AUTO: 27.4 PG (ref 26.8–34.3)
MCHC RBC AUTO-ENTMCNC: 32.9 G/DL (ref 31.4–37.4)
MCV RBC AUTO: 83 FL (ref 82–98)
OSMOLALITY UR: 295 MMOL/KG
PLATELET # BLD AUTO: 292 THOUSANDS/UL (ref 149–390)
PMV BLD AUTO: 8.8 FL (ref 8.9–12.7)
POTASSIUM SERPL-SCNC: 3.9 MMOL/L (ref 3.5–5.3)
POTASSIUM SERPL-SCNC: 4.2 MMOL/L (ref 3.5–5.3)
PROTHROMBIN TIME: 13.1 SECONDS (ref 11.6–14.5)
RBC # BLD AUTO: 3.91 MILLION/UL (ref 3.81–5.12)
SODIUM 24H UR-SCNC: 30 MOL/L
SODIUM SERPL-SCNC: 124 MMOL/L (ref 135–147)
SODIUM SERPL-SCNC: 127 MMOL/L (ref 135–147)
WBC # BLD AUTO: 8.96 THOUSAND/UL (ref 4.31–10.16)

## 2022-07-24 PROCEDURE — 99222 1ST HOSP IP/OBS MODERATE 55: CPT | Performed by: NEUROLOGICAL SURGERY

## 2022-07-24 PROCEDURE — 71046 X-RAY EXAM CHEST 2 VIEWS: CPT

## 2022-07-24 PROCEDURE — 97163 PT EVAL HIGH COMPLEX 45 MIN: CPT

## 2022-07-24 PROCEDURE — 97530 THERAPEUTIC ACTIVITIES: CPT

## 2022-07-24 PROCEDURE — 84300 ASSAY OF URINE SODIUM: CPT | Performed by: PHYSICIAN ASSISTANT

## 2022-07-24 PROCEDURE — 99233 SBSQ HOSP IP/OBS HIGH 50: CPT | Performed by: SURGERY

## 2022-07-24 PROCEDURE — 80048 BASIC METABOLIC PNL TOTAL CA: CPT | Performed by: PHYSICIAN ASSISTANT

## 2022-07-24 PROCEDURE — 85610 PROTHROMBIN TIME: CPT | Performed by: PHYSICIAN ASSISTANT

## 2022-07-24 PROCEDURE — 85730 THROMBOPLASTIN TIME PARTIAL: CPT | Performed by: PHYSICIAN ASSISTANT

## 2022-07-24 PROCEDURE — 83935 ASSAY OF URINE OSMOLALITY: CPT | Performed by: PHYSICIAN ASSISTANT

## 2022-07-24 PROCEDURE — 85027 COMPLETE CBC AUTOMATED: CPT | Performed by: PHYSICIAN ASSISTANT

## 2022-07-24 PROCEDURE — 80048 BASIC METABOLIC PNL TOTAL CA: CPT | Performed by: SURGERY

## 2022-07-24 RX ORDER — HEPARIN SODIUM 5000 [USP'U]/ML
5000 INJECTION, SOLUTION INTRAVENOUS; SUBCUTANEOUS EVERY 8 HOURS SCHEDULED
Status: DISPENSED | OUTPATIENT
Start: 2022-07-24 | End: 2022-07-25

## 2022-07-24 RX ORDER — SODIUM CHLORIDE 1000 MG
1 TABLET, SOLUBLE MISCELLANEOUS
Status: DISCONTINUED | OUTPATIENT
Start: 2022-07-24 | End: 2022-07-26

## 2022-07-24 RX ADMIN — ONDANSETRON 4 MG: 2 INJECTION INTRAMUSCULAR; INTRAVENOUS at 16:36

## 2022-07-24 RX ADMIN — METHOCARBAMOL 250 MG: 500 TABLET ORAL at 00:23

## 2022-07-24 RX ADMIN — DICLOFENAC SODIUM TOPICAL GEL, 1%, 2 G: 10 GEL TOPICAL at 22:09

## 2022-07-24 RX ADMIN — ACETAMINOPHEN 975 MG: 325 TABLET ORAL at 00:22

## 2022-07-24 RX ADMIN — ATORVASTATIN CALCIUM 20 MG: 20 TABLET, FILM COATED ORAL at 09:24

## 2022-07-24 RX ADMIN — FUROSEMIDE 20 MG: 20 TABLET ORAL at 09:24

## 2022-07-24 RX ADMIN — ACETAMINOPHEN 975 MG: 325 TABLET ORAL at 14:14

## 2022-07-24 RX ADMIN — DOCUSATE SODIUM 100 MG: 100 CAPSULE, LIQUID FILLED ORAL at 09:24

## 2022-07-24 RX ADMIN — HEPARIN SODIUM 5000 UNITS: 5000 INJECTION INTRAVENOUS; SUBCUTANEOUS at 22:09

## 2022-07-24 RX ADMIN — SODIUM CHLORIDE 500 ML: 0.9 INJECTION, SOLUTION INTRAVENOUS at 22:11

## 2022-07-24 RX ADMIN — DILTIAZEM HYDROCHLORIDE 120 MG: 60 TABLET, FILM COATED ORAL at 00:24

## 2022-07-24 RX ADMIN — DILTIAZEM HYDROCHLORIDE 120 MG: 60 TABLET, FILM COATED ORAL at 05:31

## 2022-07-24 RX ADMIN — AMLODIPINE BESYLATE 2.5 MG: 2.5 TABLET ORAL at 09:25

## 2022-07-24 RX ADMIN — METHOCARBAMOL 250 MG: 500 TABLET ORAL at 14:15

## 2022-07-24 RX ADMIN — METHOCARBAMOL 250 MG: 500 TABLET ORAL at 05:30

## 2022-07-24 RX ADMIN — ACETAMINOPHEN 975 MG: 325 TABLET ORAL at 09:28

## 2022-07-24 RX ADMIN — LIDOCAINE 5% 2 PATCH: 700 PATCH TOPICAL at 09:25

## 2022-07-24 RX ADMIN — OXYCODONE HYDROCHLORIDE 5 MG: 5 TABLET ORAL at 14:14

## 2022-07-24 RX ADMIN — SENNOSIDES 17.2 MG: 8.6 TABLET, FILM COATED ORAL at 09:24

## 2022-07-24 RX ADMIN — LEVOTHYROXINE SODIUM 50 MCG: 50 TABLET ORAL at 05:30

## 2022-07-24 RX ADMIN — SODIUM CHLORIDE 1 G: 1 TABLET ORAL at 22:09

## 2022-07-24 RX ADMIN — HEPARIN SODIUM 5000 UNITS: 5000 INJECTION INTRAVENOUS; SUBCUTANEOUS at 00:26

## 2022-07-24 RX ADMIN — PANTOPRAZOLE SODIUM 40 MG: 40 TABLET, DELAYED RELEASE ORAL at 09:24

## 2022-07-24 RX ADMIN — ACETAMINOPHEN 975 MG: 325 TABLET ORAL at 22:08

## 2022-07-24 RX ADMIN — MEMANTINE 10 MG: 10 TABLET ORAL at 09:24

## 2022-07-24 RX ADMIN — OXYCODONE HYDROCHLORIDE 5 MG: 5 TABLET ORAL at 09:24

## 2022-07-24 NOTE — PROGRESS NOTES
[facility]  Progress Note - Blanquita Gold 1933, 80 y o  female MRN: [id number]  Unit/Bed#: S -01 Encounter: [id number]  Primary Care Provider: Sb Wesley MD   Date and time admitted to hospital: 7/23/2022 10:43 AM    Fall  Assessment & Plan  - Status post fall with the below noted injuries  - Fall precautions  - Geriatric Medicine consultation for evaluation, medication review and recommendations   - PT and OT evaluation and treatment as indicated  - Case Management consultation for disposition planning  * Closed fracture of multiple ribs of left side  Assessment & Plan  - Multiple left-sided rib fractures (5 & 7), present on admission   - Continue rib fracture protocol   - Continue to encourage incentive spirometer use and adequate pulmonary hygiene  Currently pulling 500 mL on I S   - PIC score is 5   - Continue multimodal analgesic regimen  Appreciate APS evaluation and recommendations   - Supplemental oxygen via nasal cannula as needed to maintain saturations greater than or equal to 94%  - Repeat chest x-ray on 7/24/2022 pending    - PT and OT evaluation and treatment as indicated  - Outpatient follow-up in the trauma clinic for re-evaluation in approximately 2 weeks  Closed wedge compression fracture of T3 vertebra (HCC)  Assessment & Plan  - Mild T3 compression fracture, present on admission, with progression when compared to prior imaging   - Await Neurosurgery evaluation and recommendations   - Bracing: Maintain TLSO brace whenever upright >45 degrees and/or out of bed  - Spine precautions  - Monitor neurovascular exam   - Multimodal analgesic regimen as needed  - Upright spine x-rays pending placement/application of TLSO brace   - PT and OT evaluation and treatment as indicated  - Outpatient follow up with Neurosurgery for re-evaluation      Hyponatremia  Assessment & Plan  - Significant hyponatremia, present on presentation, with sodium of 127   - Unclear etiology, possibly secondary to dehydration   - Continue to monitor sodium with repeat BMP in AM or sooner if necessary   - Further workup of hyponatremia including urine electrolytes and serum osmolality  CKD (chronic kidney disease) stage 3, GFR 30-59 ml/min [facility])  Assessment & Plan  Lab Results   Component Value Date    EGFR 77 07/24/2022    EGFR 87 07/23/2022    CREATININE 0 68 07/24/2022    CREATININE 0 48 (L) 07/23/2022        - Patient with history of CKD stage 3 and baseline creatinine around 0 9  Patient follows with [facility] as outpatient  - Patient also known to 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   - No evidence of acute kidney injury at this time  - Monitor volume status and renal function with BMP  - Avoid nephrotoxic medications and hypotension   - Outpatient follow-up per routine  Chronic congestive heart failure (HCC)  Assessment & Plan  Wt Readings from Last 3 Encounters:   07/14/22 85 1 kg (187 lb 9 8 oz)     - Patient with chronic history of CHF without evidence of acute exacerbation   - Continue home medication regimen  - Monitor volume status   - Outpatient follow-up per routine  HTN (hypertension)  Assessment & Plan  - Chronic history of hypertension   - Continue home medication regimen as indicated  - Outpatient follow-up per routine  Hypothyroid  Assessment & Plan  - Patient with chronic history of hypothyroidism   - Continue home medication regimen   - Outpatient follow-up per routine  TERTIARY TRAUMA SURVEY NOTE    Prophylaxis: Sequential compression device (Venodyne)  and Heparin    Disposition:  Continue current level of care  Not yet appropriate for discharge while awaiting additional workup  Await PT and OT evaluation and recommendations  Case Management following for disposition planning  Code status:  Level 1 - Full Code    Consultants:     1  Neurosurgery  2  Geriatric Medicine        SUBJECTIVE: Transfer from: N/A  Mechanism of Injury:Fall    Chief Complaint: "I feel okay this morning "    HPI/Last 24 hour events: Patient is doing well this morning  She states she is feeling better today, but still has some left upper chest wall and shoulder discomfort  She denies any difficulty breathing or shortness of breath and rested comfortably overnight  She has no new complaints this morning      Active medications:           Current Facility-Administered Medications:     acetaminophen (TYLENOL) tablet 975 mg, 975 mg, Oral, Q8H [address], 975 mg at 07/24/22 0022    amLODIPine (NORVASC) tablet 2 5 mg, 2 5 mg, Oral, Daily, 2 5 mg at 07/23/22 1744    atorvastatin (LIPITOR) tablet 20 mg, 20 mg, Oral, Daily, 20 mg at 07/23/22 1744    cyanocobalamin injection 1,000 mcg, 1,000 mcg, Intramuscular, Q30 Days, 1,000 mcg at 07/23/22 1744    Diclofenac Sodium (VOLTAREN) 1 % topical gel 2 g, 2 g, Topical, 4x Daily, 2 g at 07/23/22 2208    diltiazem (CARDIZEM) tablet 120 mg, 120 mg, Oral, Q6H EVA, 120 mg at 07/24/22 0531    docusate sodium (COLACE) capsule 100 mg, 100 mg, Oral, BID, 100 mg at 07/23/22 1744    furosemide (LASIX) tablet 20 mg, 20 mg, Oral, BID, 20 mg at 07/23/22 1744    HYDROmorphone HCl (DILAUDID) injection 0 2 mg, 0 2 mg, Intravenous, Q4H PRN    levothyroxine tablet 50 mcg, 50 mcg, Oral, Daily, 50 mcg at 07/24/22 0530    lidocaine (LIDODERM) 5 % patch 2 patch, 2 patch, Topical, Daily, 2 patch at 07/23/22 1744    memantine (NAMENDA) tablet 10 mg, 10 mg, Oral, BID, 10 mg at 07/23/22 1744    methocarbamol (ROBAXIN) tablet 250 mg, 250 mg, Oral, Q6H [address], 250 mg at 07/24/22 0530    naloxone (NARCAN) 0 04 mg/mL syringe 0 04 mg, 0 04 mg, Intravenous, Q1MIN PRN    ondansetron (ZOFRAN) injection 4 mg, 4 mg, Intravenous, Q4H PRN    oxyCODONE (ROXICODONE) IR tablet 2 5 mg, 2 5 mg, Oral, Q4H PRN    oxyCODONE (ROXICODONE) IR tablet 5 mg, 5 mg, Oral, Q4H PRN    pantoprazole (PROTONIX) EC tablet 40 mg, 40 mg, Oral, Daily, 40 mg at 07/23/22 1744    polyethylene glycol (MIRALAX) packet 17 g, 17 g, Oral, Daily PRN, 17 g at 07/23/22 1744    senna (SENOKOT) tablet 17 2 mg, 2 tablet, Oral, Daily, 17 2 mg at 07/23/22 1744      OBJECTIVE:     Vitals:   Vitals:    07/24/22 0530   BP: 137/66   Pulse: 72   Resp:    Temp:    SpO2: 97%       Physical Exam:   GENERAL APPEARANCE: Patient in no acute distress  HEENT: NCAT; PERRL, EOMs intact; Mucous membranes moist  NECK / BACK: Mild upper thoracic spine tenderness without stepoff or deformity  No other spine tenderness  CV: Regular rate and rhythm; no murmur/gallops/rubs appreciated  CHEST / LUNGS: Clear to auscultation; no wheezes/rales/rhonci  Mild left upper and lateral chest wall tenderness without crepitus or deformity  ABD: NABS; soft; non-distended; non-tender  : Voiding spontaneously  EXT: +2 pulses bilaterally upper & lower extremities; no edema  Mild left upper arm and shoulder tenderness without deformity  Mild pain in left shoulder with ROM  NEURO: GCS 15; no focal neurologic deficits; neurovascularly intact  SKIN: Warm, dry and well perfused; no rash; no jaundice  1  Before the illness or injury that brought you to the Emergency, did you need someone to help you on a regular basis? 1=Yes   2  Since the illness or injury that brought you to the Emergency, have you needed more help than usual to take care of yourself? 1=Yes   3  Have you been hospitalized for one or more nights during the past 6 months (excluding a stay in the Emergency Department)? 1=Yes   4  In general, do you see well? 0=Yes   5  In general, do you have serious problems with your memory? 0=No   6  Do you take more than three different medications everyday?  1=Yes   TOTAL   4     Did you order a geriatric consult if the score was 2 or greater?: yes         PIC Score  PIC Pain Score: 3 (7/24/2022 12:22 AM)  PIC Incentive Spirometry Score: Below alert volume (7/23/2022 11:00 PM)  PIC Cough Description: Weak (7/23/2022 11:00 PM)  PIC Total Score: 7 (7/23/2022 11:00 PM)       If the Total PIC Score </=5, did you consult APS and evaluate patient for further intervention?: yes      Pain:    Incentive Spirometry  Cough  3 = Controlled  4 = Above goal volume 3 = Strong  2 = Moderate  3 = Goal to alert volume 2 = Weak  1 = Severe  2 = Below alert volume 1 = Absent     1 = Unable to perform IS           I/O:   I/O       07/22 0701 07/23 0700 07/23 0701 07/24 0700 07/24 0701 07/25 0700    P  O   240     Total Intake  240     Urine  1290     Total Output  1290     Net  -1050                  Invasive Devices: Invasive Devices  Report    Peripheral Intravenous Line  Duration           Peripheral IV 07/23/22 Left Antecubital <1 day          Drain  Duration           External Urinary Catheter <1 day                  Imaging:   XR shoulder 2+ views LEFT    Result Date: 7/23/2022  Impression: No acute osseous abnormality  Deformity of the left humeral head consistent with old trauma  Workstation performed: [id number]     CT head without contrast    Result Date: 7/23/2022  Impression: 1  Somewhat limited examination due to patient motion artifact  2   Stable cerebral atrophy with 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  No acute intracranial abnormality  If there is continued concern for acute intracranial injury, consider follow-up neurology consultation and/or MRI of the brain with T2 gradient echo weighted sequencing and FLAIR weighted sequencing  Workstation performed: [id number]     CT chest without contrast    Result Date: 7/23/2022  Impression: 1  Acute fractures of the left 5th and 7th ribs  No evidence of pneumothorax  2   Mild compression fracture T3, progressed from the prior study  3   Additional, stable incidental findings as described above  If warranted, consider follow-up trauma surgery and/or neuro surgery consultation  The study was marked in [facility] for immediate notification  Workstation performed: [id number]     CT spine cervical without contrast    Result Date: 7/23/2022  Impression: Stable degenerative changes of the cervical spine  No acute cervical spine fracture or traumatic malalignment  Workstation performed: [id number]     CT recon only thoracic spine    Result Date: 7/23/2022  Impression: 1  Progression of mild compression fracture T3 vertebral body  2   Stable compression fractures of T4 and T9 vertebral bodies   Workstation performed: [id number]       Labs:   CBC:   Lab Results   Component Value Date    WBC 8 96 07/24/2022    HGB 10 7 (L) 07/24/2022    HCT 32 5 (L) 07/24/2022    MCV 83 07/24/2022     07/24/2022    MCH 27 4 07/24/2022    MCHC 32 9 07/24/2022    RDW 14 0 07/24/2022    MPV 8 8 (L) 07/24/2022    NRBC 0 07/23/2022     CMP:   Lab Results   Component Value Date    CL 91 (L) 07/24/2022    CO2 32 07/24/2022    BUN 11 07/24/2022    CREATININE 0 68 07/24/2022    CALCIUM 8 7 07/24/2022    AST 21 07/23/2022    ALT 15 07/23/2022    ALKPHOS 137 (H) 07/23/2022    EGFR 77 07/24/2022       Mariela Nina PA-C  7/24/2022 07:58 AM

## 2022-07-24 NOTE — ASSESSMENT & PLAN NOTE
- Multiple left-sided rib fractures (5 & 7), present on admission   - Continue rib fracture protocol   - Continue to encourage incentive spirometer use and adequate pulmonary hygiene  Currently pulling 750 mL on I S  Flutter valve also given to patient  - PIC score is 7   - Continue multimodal analgesic regimen  Appreciate APS evaluation and recommendations   - Supplemental oxygen via nasal cannula as needed to maintain saturations greater than or equal to 94%  - Repeat chest x-ray on 7/24/2022 pending    - PT and OT evaluation and treatment as indicated  - Outpatient follow-up in the trauma clinic for re-evaluation in approximately 2 weeks

## 2022-07-24 NOTE — ASSESSMENT & PLAN NOTE
- Mild T3 compression fracture, present on admission, with progression when compared to prior imaging   - Appreciate Neurosurgery evaluation and recommendations  Non-operative management recommended  - Bracing: Initially a TLSO brace was ordered  After discussion with Neurosurgery, bracing for this fracture will be difficult due to level of fracture  Will discuss further with patient and her family  Would likely need cervical collar and TLSO brace or a  brace, but neither may cover the fracture level well per Neurosurgery  - Monitor neurovascular exam   - Multimodal analgesic regimen as needed  - PT and OT evaluation and treatment as indicated  - Outpatient follow up with Neurosurgery for re-evaluation

## 2022-07-24 NOTE — CONSULTS
Consult Note- Acute Pain Service   Nola Sánchez 80 y o  female MRN: [id number]  Unit/Bed#: S -01 Encounter: [id number]               Assessment/Plan     Assessment:   Patient Active Problem List   Diagnosis    Fall    Closed fracture of multiple ribs of left side    Closed wedge compression fracture of T3 vertebra (HCC)    HTN (hypertension)    CKD (chronic kidney disease) stage 3, GFR 30-59 ml/min ([facility])    Chronic congestive heart failure ([address] )    Hypothyroid    Hyponatremia      Nola Sánchez is a 80 y o  female with PMhx of HTN, CKD III, and HFpEF who presented with left 5 +7 rib fractures + multiple thoracic compression fractures after mechanical fall  APS consulted for post-traumatic pain needs  Upon bedside evaluation today, Tasha Burgos reported left-sided chest wall + back pain  The back pain was worse  She has not tried any oxycodone yet to alleviate her pain  Has not attempted to get OOB although it's a goal today with PT  Denies opioid-induced side effects including nausea/vomiting/itching/constipation  On 2LNC and able inspire ~400ml-500ml on spirometry  Of note, CT-chest shows pulmonary fibrosis/honeycombing along with bronchiectasis  Adequate cough  Plan: No indication for epidural at this time  Low volumes on spirometry likely 2/2 underlying pulmonary fibrosis c/b bronchiectasis  Subjectively, Tasha Burgos does not complain of significant left chest wall pain from her rib fractures   In addition, she has not engaged effectively with her pain control with PO oxycodone     - Reinforce importance of aggressive pain control with PO oxycodone (also discussed this with RN)  - Encourage aggressive PT/OT  - Encourage spirometry at least 2-3 times per hour  - Please hold any heparin tomorrow morning for potential epidural evaluation  - Continue geriatric PO oxycodone 2 5/5mg q4hr PRN for moderate-severe pain, IV dilaudid 0 2mg q4hr PRN for breakthrough  - Avoid NSAIDs given history of CKD    Multimodal analgesia:  - Tylenol 975 mg PO q8hrs standing  - Lidocaine patches to affected areas 12 hours on, 12 hours off   - PO robaxin 250mg q6 prachi    Bowel Regimen:  - Docusate (Colace) 100 mg PO twice daily  - Senna 1 tablet PO qhs    APS will continue to follow  Please contact Acute Pain Service - [facility] via [email or web address] from [phone] with additional questions or concerns  See TigerText or Amion for additional contacts and after hours information  History of Present Illness    Admit Date:  7/23/2022  Hospital Day:  1 day  Primary Service:  Trauma  Attending Provider:  Manolo Roman DO  Reason for Consult / Principal Problem: Post-traumatic acute pain  HPI: Mei Mcnamara is a 80 y o  female who presents with left acute 5 +7 rib fractures and multiple thoracic compression fractures after mechanical fall  Current pain location(s): Upper back > left chest wall  Pain Scale:   4-10/10  Quality: Sharp  Current Analgesic regimen:  See above    Pain History: N/A  Pain Management Provider:  N/A    I have reviewed the patient's controlled substance dispensing history in the Prescription Drug Monitoring Program in compliance with the [facility] regulations before prescribing any controlled substances  Inpatient consult to Acute Pain Service  Consult performed by: Gonzalez Renteria MD  Consult ordered by:  Becky Clemons PA-C          Review of Systems    Historical Information   Past Medical History:   Diagnosis Date    [facility]congestive [facility]) [facility]     Dementia without behavioral disturbance (HCC)     High cholesterol     Hypertension     Hypothyroid     Stage 3 chronic kidney disease, unspecified whether stage 3a or 3b CKD ([facility] )     [facility] 09/21/2021     Past Surgical History:   Procedure Laterality Date    APPENDECTOMY      CHOLECYSTECTOMY      NEPHRECTOMY Left     TOTAL ABDOMINAL HYSTERECTOMY W/ BILATERAL SALPINGOOPHORECTOMY       Social History   Social History     Substance and Sexual Activity   Alcohol Use Not Currently     Social History     Substance and Sexual Activity   Drug Use Never     Social History     Tobacco Use   Smoking Status Never Smoker   Smokeless Tobacco Never Used     Family History: non-contributory    Meds/Allergies   all current active meds have been reviewed    Allergies   Allergen Reactions    Penicillins Hives       Objective   Temp:  [97 6 °F (36 4 °C)-98 3 °F (36 8 °C)] 98 1 °F (36 7 °C)  HR:  [] 70  Resp:  [18-20] 18  BP: (101-163)/(56-82) 116/57    Intake/Output Summary (Last 24 hours) at 7/24/2022 0937  Last data filed at 7/24/2022 0851  Gross per 24 hour   Intake 540 ml   Output 1290 ml   Net -750 ml       Physical Exam  Constitutional:       Appearance: She is ill-appearing  HENT:      Head: Normocephalic  Mouth/Throat:      Mouth: Mucous membranes are dry  Eyes:      Pupils: Pupils are equal, round, and reactive to light  Cardiovascular:      Rate and Rhythm: Normal rate  Pulses: Normal pulses  Pulmonary:      Effort: Pulmonary effort is normal    Chest:      Chest wall: Tenderness present  Abdominal:      Palpations: Abdomen is soft  Musculoskeletal:         General: Normal range of motion  Skin:     General: Skin is dry  Neurological:      General: No focal deficit present  Mental Status: She is alert  Psychiatric:         Mood and Affect: Mood normal          Lab Results: I have personally reviewed pertinent labs  Imaging Studies: I have personally reviewed pertinent reports  EKG, Pathology, and Other Studies: I have personally reviewed pertinent reports  Counseling / Coordination of Care  Total floor / unit time spent today Level 1 = 20 minutes  Greater than 50% of total time was spent with the patient and / or family counseling and / or coordination of care  Please note that the APS provides consultative services regarding pain management only    With the exception of ketamine and epidural infusions and except when indicated, final decisions regarding starting or changing doses of analgesic medications are at the discretion of the consulting service  Off hours consultation and/or medication management is generally not available      Nicolette Lowe MD  Acute Pain Service

## 2022-07-24 NOTE — CASE MANAGEMENT
Case Management Discharge Planning Note    Patient name Isra [facility]  Location S /S -01 MRN [id number]  : 1933 Date 2022       Current Admission Date: 2022  Current Admission Diagnosis:Closed fracture of multiple ribs of left side   Patient Active Problem List    Diagnosis Date Noted    Fall 2022    Closed fracture of multiple ribs of left side 2022    Closed wedge compression fracture of T3 vertebra ([facility] ) 2022    HTN (hypertension) 2022    CKD (chronic kidney disease) stage 3, GFR 30-59 ml/min ([facility]) 2022    Chronic congestive heart failure ([facility] ) 2022    Hypothyroid 2022    Hyponatremia 2022      LOS (days): 1  Geometric Mean LOS (GMLOS) (days):   Days to GMLOS:     OBJECTIVE:  Risk of Unplanned Readmission Score: 11 61         Current admission status: Inpatient   Preferred Pharmacy: No Pharmacies Listed  Primary Care Provider: Curtis Kelly MD    Primary Insurance: [facility]  Secondary Insurance:     DISCHARGE DETAILS:    Discharge planning discussed with[de-identified] Patient and son  Freedom of Choice: Yes  Comments - Freedom of Choice: Choice is for blanket referrals for STR    CM contacted family/caregiver?: Yes  Were Treatment Team discharge recommendations reviewed with patient/caregiver?: Yes  Did patient/caregiver verbalize understanding of patient care needs?: N/A- going to facility  Were patient/caregiver advised of the risks associated with not following Treatment Team discharge recommendations?: Yes    Contacts  Patient Contacts: Lisa Banksing  Relationship to Patient[de-identified] Family  Contact Method: Phone  Phone Number: [phone]  Reason/Outcome: Continuity of Care, Emergency Contact, Discharge [address]         Is the patient interested in [facility] at discharge?: No    DME Referral Provided  Referral made for DME?: No    Other Referral/Resources/Interventions Provided:  Interventions: Short Term Rehab  Referral Comments: [place] STR referrals opened via [address]  Son stating that he would like list emailed to him at "[email or web address]"  Son reports that patient's plan is to return to [facility] when finished with STR      Would you like to participate in our [facility] service program?  : No - Declined    Treatment Team Recommendation: Short Term Rehab  Discharge Destination Plan[de-identified] Short Term Rehab (PT/OT evlaverne pending )

## 2022-07-24 NOTE — PHYSICAL THERAPY NOTE
PHYSICAL THERAPY EVALUATION  NAME: Golden Mai  AGE:   80 y o  MRN:  [id number]  ADMIT DX: Shoulder pain [M25 519]  Fall, initial encounter [W19  XXXA]  Closed fracture of multiple ribs of left side, initial encounter [S22 42XA]  Closed fracture of third thoracic vertebra, unspecified fracture morphology, initial encounter ([place] [address] ) Jeanette Carmichael    PMH:   Past Medical History:   Diagnosis Date    CHF (congestive heart failure) ([facility])     Dementia without behavioral disturbance (HCC)     High cholesterol     Hypertension     Hypothyroid     Stage 3 chronic kidney disease, unspecified whether stage 3a or 3b CKD ([place] [address] )     Stroke ([place] [address] ) 09/21/2021     LENGTH OF STAY: 1       07/24/22 1605   PT Last Visit   PT Visit Date 07/24/22   Note Type   Note type Evaluation   Pain Assessment   Pain Assessment Tool [facility] Pain Intervention(s) Repositioned; Ambulation/increased activity   Pain Rating: FLACC (Rest) - Face 1   Pain Rating: FLACC (Rest) - Legs 1   Pain Rating: FLACC (Rest) - Activity 1   Pain Rating: FLACC (Rest) - Cry 1   Pain Rating: FLACC (Rest) - Consolability 1   Score: FLACC (Rest) 5   Pain Rating: FLACC (Activity) - Face 2   Pain Rating: FLACC (Activity) - Legs 1   Pain Rating: FLACC (Activity) - Activity 2   Pain Rating: FLACC (Activity) - Cry 2   Pain Rating: FLACC (Activity) - Consolability 1   Score: FLACC (Activity) 8   Restrictions/Precautions   Weight Bearing Precautions Per Order No   Braces or Orthoses (S)    (Per Trauma, pt and family will defer bracing at this time )   Other Precautions Chair Alarm; Bed Alarm;Multiple lines;O2;Fall Risk;Pain;Spinal precautions  (1 L O2 NC)   Home Living   Type of Home Assisted living  ([facility])   Home Layout One level   Home Equipment Walker   Additional Comments Ambulates with mod I and RW at baseline     Prior Function   Level of [place] Independent with ADLs and functional mobility   Lives With Facility staff   Receives Help From Personal care attendant   ADL Assistance Independent   IADLs Needs assistance   Falls in the last 6 months 1 to 4  (3 or 4)   General   Additional Pertinent History L 5th and 7th rib fx; worsening of T3 compression fracture and stable T4/T9 compression fractures   Family/Caregiver Present No   Cognition   Overall Cognitive Status Impaired  (?continue to assess)   Arousal/Participation Cooperative   Orientation Level Oriented to person;Oriented to place;Oriented to situation  ((+) general time)   Memory Decreased short term memory;Decreased recall of precautions   Following Commands Follows one step commands with increased time or repetition   Comments Pt identified by name and   Subjective   Subjective Agrees to PT evaluation and is cooperative throughout session  "I know I should get up "   RLE Assessment   RLE Assessment X   Strength RLE   RLE Overall Strength 4/5  (functionally)   LLE Assessment   LLE Assessment X   Strength LLE   LLE Overall Strength 4/5   Bed Mobility   Supine to Sit 3  Moderate assistance   Additional items Assist x 1;HOB elevated; Bedrails; Increased time required;Verbal cues;LE management  (cues for log rolling)   Sit to Supine Unable to assess  (left OOB in chair post session with alarm intact)   Transfers   Sit to Stand 3  Moderate assistance   Additional items Assist x 1; Increased time required;Verbal cues;Armrests   Stand to Sit 4  Minimal assistance   Additional items Assist x 1; Increased time required;Verbal cues   Stand pivot 4  Minimal assistance   Additional items Assist x 1; Increased time required;Verbal cues  (steppage transfer OOB to commode with RW)   Additional Comments difficulty placing LUE on RW due to pain   Balance   Static Sitting Fair -   Dynamic Sitting Poor +   Static Standing Fair -   Dynamic Standing Poor +   Ambulatory Poor +   Endurance Deficit   Endurance Deficit Yes   Endurance Deficit Description limited ambulation distance, fatigue   Activity Tolerance   Activity Tolerance Patient limited by fatigue;Patient limited by pain   Nurse Made Aware updated on status   Assessment   Prognosis Fair   Problem List Decreased strength;Decreased range of motion;Decreased endurance; Impaired balance;Decreased mobility; Decreased safety awareness;Orthopedic restrictions;Pain   Goals   Patient Goals to get OOB to the chair   STG Expiration Date 08/03/22   Short Term Goal #1 Pt will be able to: (1) perform bed mobility with supervision to promote OOB activity (2) perform sit to stand with supervision to decrease burden of care (3) ambulate at least 200` with supervision and least restrictive AD to increase activity tolerance (4) increase standing balance by 1 grade to decrease risk of falls   PT Treatment Day 1   Plan   Treatment/Interventions Functional transfer training;LE strengthening/ROM; Therapeutic exercise; Endurance training;Patient/family training;Equipment eval/education; Bed mobility;Gait training   PT Frequency 4-6x/wk   Recommendation   PT Discharge Recommendation Post acute rehabilitation services  (vs  return to facility with PT services)   [facility]   Melissa [address]   Turning in Bed Without Bedrails 2   Lying on Back to Sitting on Edge of Flat Bed 2   Moving Bed to Chair 3   Standing Up From Chair 2   Walk in Room 1   Climb 3-5 Stairs 1   Basic Mobility Inpatient Raw Score 11   Basic Mobility Standardized Score 30 25   Highest Level Of Mobility   -[facility] Goal 4: Move to chair/commode   -[facility] Achieved 4: Move to chair/commode   Additional Treatment Session   Start Time 1555   End Time 1605   Treatment Assessment Pt agrees to further mobility and is cooperative througout session  Reports mild lightheadedness, however pt reports resolving and BP stable  Able to perform additional steppage transfer from commode to chair with min A and use of RW    Increased time required and incontinent of small amount of urine upon standing  Decreased LUE support on RW  Pt repositioned in chair for comfort and care coordination completed with PCA and RN to inform of status  Will continue to benefit from ongoing skilled PT to maximize her functional mobility and increase her level of independence  Education provided on general fall precautions in hospital with reported understanding  Equipment Use RW   Additional Treatment Day 1   End of Consult   Patient Position at End of Consult Bedside chair;Bed/Chair alarm activated; All needs within reach   The patient's AM-PAC Basic Mobility Inpatient Short Form Raw Score is 11, Standardized Score is 30 25  A standardized score less than 40 78 or Raw Score of 16 suggests the patient may benefit from discharge to post-acute rehabilitation services, which DOES coincide with CURRENT above PT recommendations  However please refer to therapist recommendation for discharge planning given other factors that may influence destination  Adapted from Julieta Lindquist of AM-PAC 6-Clicks Basic Mobility and Daily Activity Scores With Discharge Destination  Physical Therapy, 2021;101:1-9  DOI: 10 [id number]          Assessment: Pt is a 80 y o  female seen for PT evaluation s/p [facility] w/ Closed fracture of multiple ribs of left side  Order placed for PT  Comorbidities affecting pt's physical performance at time of assessment include: HTN, CHF, and CVA  Personal factors affecting pt at time of IE include: advanced age, inability to perform IADLs, inability to perform ADLs, and recent fall(s)  Prior to admission, pt was  living at [address] and was mod I for ambulation with RW   Upon evaluation: Pt requires mod A for bed mobility, mod A for sit to stand, and min A for steppage transfers with RW  (Please find full objective findings from PT assessment regarding body systems outlined above)   Impairments and limitations also listed above, especially due to  weakness, decreased ROM, impaired balance, decreased endurance, pain, decreased activity tolerance, and fall risk  Pt's clinical presentation is currently unstable/unpredictable seen in pt's presentation of fall risk, new spinal compression fractures/spinal precautions, and significant pain with mobility  Pt to benefit from continued skilled PT tx while in hospital and upon DC to address deficits as defined above and maximize level of functional mobility  From PT/mobility standpoint, recommendation at time of d/c would be  inpatient rehab vs  Return to facility with PT services  pending progress and level of assist available  Recommend  progression of ambulation and initiation of HEP as appropriate         Valiant Abdirashid, PT,DPT

## 2022-07-24 NOTE — UTILIZATION REVIEW
Inpatient Admission Authorization Request   NOTIFICATION OF INPATIENT ADMISSION/INPATIENT AUTHORIZATION REQUEST   [facility]IC[facility] FACILITY[facility]   [address] [facility] [address] [place] [address]  Tax ID: [id number]  NPI: [id number]  Place of Service: [facility]patient [facility]  [address]  Place of Service Code: 24     ATTENDING PROVIDER:  Attending Name and NPI#: Eleanor Walsh [[id number]]  Address: [address], [address] [place] [address]  Phone: [phone]     UTILIZATION REVIEW CONTACT:  Sohan Martinez, Utilization   Network Utilization Review Department  Phone: [phone]  Fax: [phone]  Email: [email or web address]     PHYSICIAN ADVISORY SERVICES:  FOR NLTZ-ZR-PJBS REVIEW - MEDICAL NECESSITY DENIAL  Phone: [phone]  Fax: [phone]  Email: [email or web address]     TYPE OF REQUEST:  Inpatient Status     ADMISSION INFORMATION:  ADMISSION DATE/TIME: 7/23/22  2:09 PM  PATIENT DIAGNOSIS CODE/DESCRIPTION:  Shoulder pain [M25 519]  Fall, initial encounter [W19  XXXA]  Closed fracture of multiple ribs of left side, initial encounter [S22 42XA]  Closed fracture of third thoracic vertebra, unspecified fracture morphology, [facility] [S22 039A]  DISCHARGE DATE/TIME: No discharge date for patient encounter  IMPORTANT INFORMATION:  Please contact Sohan Martinez directly with any questions or concerns regarding this request  Department voicemails are confidential     Send requests for admission clinical reviews, concurrent reviews, approvals, and administrative denials due to lack of clinical to fax [phone]

## 2022-07-24 NOTE — CONSULTS
Consultation - Neurosurgery   Madai Ramsey 80 y o  female MRN: [id number]  Unit/Bed#: S -01 Encounter: [id number]      Assessment/Plan     Assessment:  27-year-old woman with mild T3 compression fracture which is progressed minimally compared to previous in addition to stable appearing T4 and T9 vertebral body fractures in the setting of left 5th and 7th rib fractures  Patient reports chronic pain throughout her back and has had previous lumbar spine surgery  Patient personally seen examined independently at approximately 07:15  Case discussed with trauma team at approximately 07:35  I reviewed her history, physical examination and imaging results in detail with her today  I suspect this is a stable fracture though there has been some mild progression  Bracing T3 is difficult, especially in the setting of rib fractures  As such could consider a Vista Tx and backpack brace as this may be more comfortable than a   This could be placed for comfort when upright  No neurosurgical intervention is anticipated  Plan:  1  Continue monitor neurological examination closely  2  Medical management and pain management as per admitting team   3  Can attempted bracing for T3 compression fracture, though this may be difficult with rib fractures  Obtain upright plain films when able  4  Neurosurgery will follow-up on upright plain films when available  History of Present Illness     HPI: Madai Ramsey is a 80y o  year old female who presents with generalized pain and soreness after falling 1 week ago  Yesterday, she slid out of her recliner and was unable to get up  She continues to have diffuse pain, though it does not seem as if this is worse than the pain she has had over the past week or so and more chronically  She denies any weakness or numbness in her legs  No changes in bowel bladder function    She describes diffuse axial spine pain which is not clearly different than her more chronic pain  CT imaging demonstrates some mild progression of a to known T3 fracture  Neurosurgery has been consulted  Of note, patient also has left-sided rib fractures  Inpatient consult to Neurosurgery  Consult performed by: Mai Hernandez MD  Consult ordered by: Deandra Fuller PA-C          Review of Systems   Musculoskeletal: Positive for arthralgias and myalgias  Neurological: Positive for weakness  All other systems reviewed and are negative  Historical Information   Past Medical History:   Diagnosis Date    CHF (congestive heart failure) (HCC)     Dementia without behavioral disturbance (HCC)     High cholesterol     Hypertension     Hypothyroid     Stage 3 chronic kidney disease, unspecified whether stage 3a or 3b CKD ([facility] )     Stroke ([facility] ) 09/21/2021     Past Surgical History:   Procedure Laterality Date    APPENDECTOMY      CHOLECYSTECTOMY      NEPHRECTOMY Left     TOTAL ABDOMINAL HYSTERECTOMY W/ BILATERAL SALPINGOOPHORECTOMY       Social History     Substance and Sexual Activity   Alcohol Use Not Currently     Social History     Substance and Sexual Activity   Drug Use Never     E-Cigarette/Vaping    E-Cigarette Use Never User      E-Cigarette/Vaping Substances     Social History     Tobacco Use   Smoking Status Never Smoker   Smokeless Tobacco Never Used     No family history on file      Meds/Allergies   all current active meds have been reviewed, current meds:   Current Facility-Administered Medications   Medication Dose Route Frequency    acetaminophen (TYLENOL) tablet 975 mg  975 mg Oral Q8H [address]    amLODIPine (NORVASC) tablet 2 5 mg  2 5 mg Oral Daily    atorvastatin (LIPITOR) tablet 20 mg  20 mg Oral Daily    cyanocobalamin injection 1,000 mcg  1,000 mcg Intramuscular Q30 Days    Diclofenac Sodium (VOLTAREN) 1 % topical gel 2 g  2 g Topical 4x Daily    diltiazem (CARDIZEM) tablet 120 mg  120 mg Oral Q6H [address]    docusate sodium (COLACE) capsule 100 mg  100 mg Oral BID  furosemide (LASIX) tablet 20 mg  20 mg Oral BID    HYDROmorphone HCl (DILAUDID) injection 0 2 mg  0 2 mg Intravenous Q4H PRN    levothyroxine tablet 50 mcg  50 mcg Oral Daily    lidocaine (LIDODERM) 5 % patch 2 patch  2 patch Topical Daily    memantine (NAMENDA) tablet 10 mg  10 mg Oral BID    methocarbamol (ROBAXIN) tablet 250 mg  250 mg Oral Q6H [facility]    naloxone (NARCAN) 0 04 mg/mL syringe 0 04 mg  0 04 mg Intravenous Q1MIN PRN    ondansetron (ZOFRAN) injection 4 mg  4 mg Intravenous Q4H PRN    oxyCODONE (ROXICODONE) IR tablet 2 5 mg  2 5 mg Oral Q4H PRN    oxyCODONE (ROXICODONE) IR tablet 5 mg  5 mg Oral Q4H PRN    pantoprazole (PROTONIX) EC tablet 40 mg  40 mg Oral Daily    polyethylene glycol (MIRALAX) packet 17 g  17 g Oral Daily PRN    senna (SENOKOT) tablet 17 2 mg  2 tablet Oral Daily    and PTA meds:   Prior to Admission Medications   Prescriptions Last Dose Informant Patient Reported? Taking?    Diclofenac Sodium (VOLTAREN) 1 %   Yes Yes   Sig: Apply 2 g topically 4 (four) times a day   Multiple Vitamins-Minerals ([employer]) TABS   Yes Yes   Sig: Take by mouth   acetaminophen (TYLENOL) 325 mg tablet   No No   Sig: Take 2 tablets (650 mg total) by mouth every 6 (six) hours as needed for mild pain   amLODIPine (NORVASC) 2 5 mg tablet   Yes Yes   Sig: Take 2 5 mg by mouth daily   atorvastatin (LIPITOR) 20 mg tablet   Yes Yes   Sig: Take 20 mg by mouth daily   clopidogrel (PLAVIX) 75 mg tablet   Yes Yes   Sig: Take 75 mg by mouth daily   cyanocobalamin 1,000 mcg/mL   Yes Yes   Sig: Inject 1,000 mcg into a muscle every 30 (thirty) days   diltiazem (CARDIZEM) 120 MG tablet   Yes Yes   Sig: Take 120 mg by mouth every 6 (six) hours   furosemide (LASIX) 20 mg tablet   Yes Yes   Sig: Take 20 mg by mouth 2 (two) times a day   glucose blood test strip   Yes Yes   Sig: Use 1 each daily as needed Use as instructed   levothyroxine 50 mcg tablet   Yes Yes   Sig: Take 50 mcg by mouth daily   memantine (NAMENDA) 10 mg tablet   Yes Yes   Sig: Take 10 mg by mouth 2 (two) times a day   pantoprazole (PROTONIX) 40 mg tablet   Yes Yes   Sig: Take 40 mg by mouth daily      Facility-Administered Medications: None     Allergies   Allergen Reactions    Penicillins Hives       Objective     Intake/Output Summary (Last 24 hours) at 7/24/2022 1045  Last data filed at 7/24/2022 0851  Gross per 24 hour   Intake 540 ml   Output 1290 ml   Net -750 ml       Physical Exam  Vitals reviewed  Constitutional:       General: She is not in acute distress  Eyes:      Extraocular Movements: Extraocular movements intact  Cardiovascular:      Rate and Rhythm: Normal rate and regular rhythm  Pulmonary:      Effort: Pulmonary effort is normal  No respiratory distress  Musculoskeletal:         General: Deformity present  Comments: Mildly exaggerated thoracic kyphosis, though it is difficult for the patient to turn in bed secondary to rib pain  Tender to palpation diffusely so through upper thoracic and midthoracic spine  No obvious step deformity or ecchymosis  Skin:     General: Skin is warm and dry  Neurological:      Mental Status: She is alert  Comments: 5/5 power in lower extremities  Reports normal light touch sensation lower extremities  Psychiatric:         Mood and Affect: Mood normal          Behavior: Behavior normal          Cognition and Memory: Cognition is impaired  Neurologic Exam    Vitals:Blood pressure 116/57, pulse 70, temperature 98 1 °F (36 7 °C), temperature source Oral, resp  rate 18, SpO2 94 %  ,There is no height or weight on file to calculate BMI  Lab Results:   I have personally reviewed pertinent results      Lab Results   Component Value Date    WBC 8 96 07/24/2022    HGB 10 7 (L) 07/24/2022    HCT 32 5 (L) 07/24/2022    MCV 83 07/24/2022     07/24/2022    MCH 27 4 07/24/2022    MCHC 32 9 07/24/2022    RDW 14 0 07/24/2022    MPV 8 8 (L) 07/24/2022    NRBC 0 07/23/2022 SODIUM 127 (L) 07/24/2022    CL 91 (L) 07/24/2022    CO2 32 07/24/2022    BUN 11 07/24/2022    CREATININE 0 68 07/24/2022    CALCIUM 8 7 07/24/2022    AST 21 07/23/2022    ALT 15 07/23/2022    ALKPHOS 137 (H) 07/23/2022    EGFR 77 07/24/2022    INR 0 99 07/24/2022       Imaging Studies: I have personally reviewed pertinent reports  and I have personally reviewed pertinent films in PACS     CT scan of thoracic spine dated July 23rd, 2022  Comparison to previous imaging  Stable appearance of T4 and T9 vertebral body fractures  Possible mild progression of mild superior endplate T3 vertebral body fracture  Overall alignment is stable compared to previous  No evidence of posterior structure injury  EKG, Pathology, and Other Studies: I have personally reviewed pertinent reports  and I have personally reviewed pertinent films in PACS    VTE Prophylaxis: Sequential compression device Batsheva Narayan     Code Status: Level 1 - Full Code  Advance Directive and Living Will:      Power of :    POLST:      Counseling / Coordination of Care  Counseling/Coordination of Care: Total floor / unit time spent today 20 minutes  Greater than 50% of total time was spent with the patient and / or family counseling and / or coordination of care  A description of the counseling / coordination of care: see assessment and plan documentation above for description

## 2022-07-24 NOTE — PLAN OF CARE
Problem: MOBILITY - ADULT  Goal: Maintain or return to baseline ADL function  Description: INTERVENTIONS:  -  Assess patient's ability to carry out ADLs; assess patient's baseline for ADL function and identify physical deficits which impact ability to perform ADLs (bathing, care of mouth/teeth, toileting, grooming, dressing, etc )  - Assess/evaluate cause of self-care deficits   - Assess range of motion  - Assess patient's mobility; develop plan if impaired  - Assess patient's need for assistive devices and provide as appropriate  - Encourage maximum independence but intervene and supervise when necessary  - Involve family in performance of ADLs  - Assess for home care needs following discharge   - Consider OT consult to assist with ADL evaluation and planning for discharge  - Provide patient education as appropriate  Outcome: Progressing  Goal: Maintains/Returns to pre admission functional level  Description: INTERVENTIONS:  - Perform BMAT or MOVE assessment daily    - Set and communicate daily mobility goal to care team and patient/family/caregiver  - Collaborate with rehabilitation services on mobility goals if consulted  - Perform Range of Motion 4 times a day  - Reposition patient every 2 hours    - Dangle patient 4 times a day  - Stand patient 4 times a day  - Ambulate patient 4 times a day  - Out of bed to chair 4 times a day   - Out of bed for meals 4 times a day  - Out of bed for toileting  - Record patient progress and toleration of activity level   Outcome: Progressing     Problem: PAIN - ADULT  Goal: Verbalizes/displays adequate comfort level or baseline comfort level  Description: Interventions:  - Encourage patient to monitor pain and request assistance  - Assess pain using appropriate pain scale  - Administer analgesics based on type and severity of pain and evaluate response  - Implement non-pharmacological measures as appropriate and evaluate response  - Consider cultural and social influences on pain and pain management  - Notify physician/advanced practitioner if interventions unsuccessful or patient reports new pain  Outcome: Progressing     Problem: INFECTION - ADULT  Goal: Absence or prevention of progression during hospitalization  Description: INTERVENTIONS:  - Assess and monitor for signs and symptoms of infection  - Monitor lab/diagnostic results  - Monitor all insertion sites, i e  indwelling lines, tubes, and drains  - Monitor endotracheal if appropriate and nasal secretions for changes in amount and color  - [place] appropriate cooling/warming therapies per order  - Administer medications as ordered  - Instruct and encourage patient and family to use good hand hygiene technique  - Identify and instruct in appropriate isolation precautions for identified infection/condition  Outcome: Progressing  Goal: Absence of fever/infection during neutropenic period  Description: INTERVENTIONS:  - Monitor WBC    Outcome: Progressing     Problem: SAFETY ADULT  Goal: Maintain or return to baseline ADL function  Description: INTERVENTIONS:  -  Assess patient's ability to carry out ADLs; assess patient's baseline for ADL function and identify physical deficits which impact ability to perform ADLs (bathing, care of mouth/teeth, toileting, grooming, dressing, etc )  - Assess/evaluate cause of self-care deficits   - Assess range of motion  - Assess patient's mobility; develop plan if impaired  - Assess patient's need for assistive devices and provide as appropriate  - Encourage maximum independence but intervene and supervise when necessary  - Involve family in performance of ADLs  - Assess for home care needs following discharge   - Consider OT consult to assist with ADL evaluation and planning for discharge  - Provide patient education as appropriate  Outcome: Progressing  Goal: Maintains/Returns to pre admission functional level  Description: INTERVENTIONS:  - Perform BMAT or MOVE assessment daily    - Set and communicate daily mobility goal to care team and patient/family/caregiver  - Collaborate with rehabilitation services on mobility goals if consulted  - Perform Range of Motion 4 times a day  - Reposition patient every 2 hours    - Dangle patient 4 times a day  - Stand patient 4 times a day  - Ambulate patient 4 times a day  - Out of bed to chair 4 times a day   - Out of bed for meals 4 times a day  - Out of bed for toileting  - Record patient progress and toleration of activity level   Outcome: Progressing  Goal: Patient will remain free of falls  Description: INTERVENTIONS:  - Educate patient/family on patient safety including physical limitations  - Instruct patient to call for assistance with activity   - Consult OT/PT to assist with strengthening/mobility   - Keep Call bell within reach  - Keep bed low and locked with side rails adjusted as appropriate  - Keep care items and personal belongings within reach  - Initiate and maintain comfort rounds  - Make Fall Risk Sign visible to staff  - Offer Toileting every 2 Hours, in advance of need  - Initiate/Maintain BED alarm  - Obtain necessary fall risk management equipment: bed alarm  - Apply yellow socks and bracelet for high fall risk patients  - Consider moving patient to room near nurses station  Outcome: Progressing     Problem: DISCHARGE PLANNING  Goal: Discharge to home or other facility with appropriate resources  Description: INTERVENTIONS:  - Identify barriers to discharge w/patient and caregiver  - Arrange for needed discharge resources and transportation as appropriate  - Identify discharge learning needs (meds, wound care, etc )  - Arrange for interpretive services to assist at discharge as needed  - Refer to Case Management Department for coordinating discharge planning if the patient needs post-hospital services based on physician/advanced practitioner order or complex needs related to functional status, cognitive ability, or social support system  Outcome: Progressing

## 2022-07-24 NOTE — ASSESSMENT & PLAN NOTE
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

## 2022-07-24 NOTE — ASSESSMENT & PLAN NOTE
- Significant hyponatremia, present on presentation, with sodium of 127   - Unclear etiology, possibly secondary to dehydration   - Continue to monitor sodium with repeat BMP in AM or sooner if necessary   - Further workup of hyponatremia including urine electrolytes and serum osmolality

## 2022-07-24 NOTE — UTILIZATION REVIEW
Initial Clinical Review    Admission: Date/Time/Statement:   Admission Orders (From admission, onward)     Ordered        07/23/22 1408  Inpatient Admission  Once                      Orders Placed This Encounter   Procedures    Inpatient Admission     Standing Status:   Standing     Number of Occurrences:   1     Order Specific Question:   Level of Care     Answer:   Med Surg [16]     Order Specific Question:   Bed Type     Answer:   Trauma [7]     Order Specific Question:   Estimated length of stay     Answer:   More than 2 Midnights     Order Specific Question:   Certification     Answer:   I certify that inpatient services are medically necessary for this patient for a duration of greater than two midnights  See H&P and MD Progress Notes for additional information about the patient's course of treatment  ED Arrival Information     Expected   -    Arrival   7/23/2022 10:42    Acuity   Urgent            Means of arrival   Ambulance    Escorted by   [facility]    Service   Trauma    Admission type   Urgent            Arrival complaint   Shoulder Pain           Chief Complaint   Patient presents with    Fall     Pt fall/slid out of recliner landing on rear  C/o L shoulder pain  Denies head strike  Initial Presentation: 80 y o  female  From [address] living to ED via ems admitted inpatient due to left  5th and 7th Rib fractures/progressio of T3 fracture/hyponatemia  Presented due to pain in left shoulder, left rib cage and left mid back starting after slipped out of chair, on ground about 2 hours until found on day of arrival    Has had pain in left shoulder and back since fall 9 days prior  On exam: tachycardic  Tenderness to left anterior chest wall beneath left breast   Midline tenderness from  cervical spine to lower thoracic spine  Pain with ROM of shoulder  Na 127  Wbc 11 71  Xray of left shoulder shoulder old trauma deformity     Ct chest showed fracture of left 5 and 7 ribs, compression fracture T3 progressed  In the ED given IV fentanyl  Plan is multimodal pain regimen  Consult pain service and neuro surgery  PT/OT  Trend BMP    Date: 7/24/22    Day 2: Has continued left upper chest wall pain  on exam on oxygen 2 liters   - 500 ml  Adequate cough  Mild upper thoracic spine tenderness  Mild left chest wall tenderness  Pain in left shoulder with ROM  Plan is PT/OT  Pain control  TLSO brace  7/24/22 per neuro surgery - patient with T 3 compression fracture with minimal progression and T4 and T9 fractures stable  Has chronic back pain and previous lumbar surgery  Plan is monitor neuro status  Brace if able  Upright Xray when able    7/24/22 per Acute pain service - Patient post fall with left 5, 7 rib fracture and multiple thoracic compression fractures  No need epidural    Has not tried oxycodone  Plan is aggressive pain control with oxycodone, scheduled tylenol, IV Dilaudid as needed, Lidocaine patches and scheduled Robaxin  Bowel regimen       ED Triage Vitals   Temperature Pulse Respirations Blood Pressure SpO2   07/23/22 1046 07/23/22 1046 07/23/22 1046 07/23/22 1046 07/23/22 1046   97 9 °F (36 6 °C) 103 18 155/82 100 %      Temp Source Heart Rate Source Patient Position - Orthostatic VS BP Location FiO2 (%)   07/23/22 1046 07/23/22 1046 07/23/22 1046 07/23/22 1046 --   Oral Monitor Lying Right arm       Pain Score       07/23/22 1356       4          Wt Readings from Last 1 Encounters:   07/14/22 85 1 kg (187 lb 9 8 oz)     Additional Vital Signs:   07/24/22 10:54:42 98 1 °F (36 7 °C) 76 18 109/58 75 95 % 28 2 L/min Nasal cannula Lying   07/24/22 07:38:45 98 1 °F (36 7 °C) 70 18 116/57 77 94 % 28 2 L/min Nasal cannula Lying   07/24/22 05:30:27 -- 72 -- 137/66 90 97 % -- -- -- --   07/24/22 03:02:15 98 °F (36 7 °C) 64 18 109/56 74 94 % 28 2 L/min Nasal cannula --   07/24/22 00:11:48 -- 73 -- 125/62 83 96 % -- -- -- --   07/23/22 22:09:34 97 6 °F (36 4 °C) 81 -- 126/68 87 97 % -- -- -- --   07/23/22 18:46:58 98 3 °F (36 8 °C) 88 -- 101/57 72 94 % -- -- -- --   07/23/22 1609 -- 85 20 -- -- 98 % 28 2 L/min Nasal cannula        Pertinent Labs/Diagnostic Test Results:   XR shoulder 2+ views LEFT   Final Result by Nakul Knight MD (07/23 1228)      No acute osseous abnormality  Deformity of the left humeral head consistent with old trauma  Workstation performed: [id number]         CT head without contrast   Final Result by Luiz Monroy DO (07/23 1208)   1  Somewhat limited examination due to patient motion artifact  2   Stable cerebral atrophy with 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  No acute intracranial abnormality  If there is continued concern for acute intracranial injury, consider follow-up neurology consultation and/or MRI of the brain with T2 gradient echo weighted sequencing and FLAIR weighted sequencing  Workstation performed: [id number]         CT spine cervical without contrast   Final Result by Luiz Monroy DO (07/23 1213)   Stable degenerative changes of the cervical spine  No acute cervical spine fracture or traumatic malalignment  Workstation performed: [id number]         CT chest without contrast   Final Result by Luiz Monroy DO (07/23 1235)   1  Acute fractures of the left 5th and 7th ribs  No evidence of pneumothorax  2   Mild compression fracture T3, progressed from the prior study  3   Additional, stable incidental findings as described above  If warranted, consider follow-up trauma surgery and/or neuro surgery consultation  The study was [facility] in [facility] for immediate notification  Workstation performed: [id number]         CT recon only thoracic spine   Final Result by Luiz Monroy DO (07/23 1238)   1  Progression of mild compression fracture T3 vertebral body        2   Stable compression fractures of T4 and T9 vertebral bodies                 Workstation performed: [id number]             Results from last 7 days   Lab Units 07/24/22  0515 07/23/22  1205   WBC Thousand/uL 8 96 11 71*   HEMOGLOBIN g/dL 10 7* 12 9   HEMATOCRIT % 32 5* 38 3   PLATELETS Thousands/uL 292 286   NEUTROS ABS Thousands/µL  --  9 37*     Results from last 7 days   Lab Units 07/24/22  0515 07/23/22  1205   SODIUM mmol/L 127* 127*   POTASSIUM mmol/L 3 9 4 2   CHLORIDE mmol/L 91* 89*   CO2 mmol/L 32 29   ANION GAP mmol/L 4 9   BUN mg/dL 11 7   CREATININE mg/dL 0 68 0 48*   EGFR ml/min/1 73sq m 77 87   CALCIUM mg/dL 8 7 9 2     Results from last 7 days   Lab Units 07/23/22  1205   AST U/L 21   ALT U/L 15   ALK PHOS U/L 137*   TOTAL PROTEIN g/dL 7 2   ALBUMIN g/dL 3 9   TOTAL BILIRUBIN mg/dL 0 52     Results from last 7 days   Lab Units 07/24/22  0515 07/23/22  1205   GLUCOSE RANDOM mg/dL 94 108     Results from last 7 days   Lab Units 07/23/22  1205   CK TOTAL U/L 47     Results from last 7 days   Lab Units 07/24/22  0515   PROTIME seconds 13 1   INR  0 99   PTT seconds 36       ED Treatment:   Medication Administration from 07/23/2022 1042 to 07/23/2022 1502       Date/Time Order Dose Route Action Comments     07/23/2022 1207 Diclofenac Sodium (VOLTAREN) 1 % topical gel 2 g 2 g Topical Given      07/23/2022 1206 lidocaine (LIDODERM) 5 % patch 1 patch 1 patch Topical Medication Applied middle, upper     07/23/2022 1206 acetaminophen (TYLENOL) tablet 325 mg 325 mg Oral Given      07/23/2022 1356 fentanyl citrate (PF) 100 MCG/2ML 20 mcg 20 mcg Intravenous Given         Past Medical History:   Diagnosis Date    CHF (congestive heart failure) (HCC)     [facility]     High cholesterol     Hypertension     Hypothyroid     Stage 3 chronic kidney disease, unspecified whether stage 3a or 3b CKD ([facility]     Stroke ([facility] ) 09/21/2021     Present on Admission:   Fall   Closed fracture of multiple ribs of left side   Closed wedge compression fracture of T3 vertebra ([facility])   HTN (hypertension)   CKD (chronic kidney disease) stage 3, GFR 30-59 ml/min ([facility])   Chronic congestive heart failure ([facility] )   Hypothyroid   Hyponatremia      Admitting Diagnosis: Shoulder pain [M25 519]  Fall, initial encounter [W19  XXXA]  Closed fracture of multiple ribs of left side, initial encounter [S22 42XA]  Closed fracture of third thoracic vertebra, unspecified fracture morphology, initial encounter ([facility] ) [S22 039A]  Age/Sex: 80 y o  female  Admission Orders:  7/23/22 1408 inpatient   Scheduled Medications:  acetaminophen, 975 mg, Oral, Q8H [address]  amLODIPine, 2 5 mg, Oral, Daily  atorvastatin, 20 mg, Oral, Daily  cyanocobalamin, 1,000 mcg, Intramuscular, Q30 Days  Diclofenac Sodium, 2 g, Topical, 4x Daily  diltiazem, 120 mg, Oral, Q6H EVA  docusate sodium, 100 mg, Oral, BID  furosemide, 20 mg, Oral, BID  levothyroxine, 50 mcg, Oral, Daily  lidocaine, 2 patch, Topical, Daily  memantine, 10 mg, Oral, BID  methocarbamol, 250 mg, Oral, Q6H EVA  pantoprazole, 40 mg, Oral, Daily  senna, 2 tablet, Oral, Daily      Continuous IV Infusions: none      PRN Meds:  HYDROmorphone, 0 2 mg, Intravenous, Q4H PRN  naloxone, 0 04 mg, Intravenous, Q1MIN PRN  ondansetron, 4 mg, Intravenous, Q4H PRN  oxyCODONE, 2 5 mg, Oral, Q4H PRN  oxyCODONE, 5 mg, Oral, Q4H PRN - used x 2 7/24/22   polyethylene glycol, 17 g, Oral, Daily PRN - used x 1 7/23/22     Neuro check every 4 hours  Incentive spirometry  Continuous pulse oximetry  Spine brace - TLSO  Thoracic spine precautions  IP CONSULT TO ACUTE PAIN SERVICE  IP CONSULT TO NEUROSURGERY  IP CONSULT TO GERONTOLOGY    Network Utilization Review Department  ATTENTION: Please call with any questions or concerns to [phone] and carefully listen to the prompts so that you are directed to the right person   All voicemails are confidential   Destiney Zuniga all requests for admission clinical reviews, approved or denied determinations and any other requests to dedicated fax number below belonging to the campus where the patient is receiving treatment   List of dedicated fax numbers for the Facilities:  [address] [place] [address] DENIALS (Administrative/Medical Necessity) [phone]   [address]  [facility] (Maternity/NICU/Pediatrics) [phone]   [address]  [id number] [place] [address] Melissa Jackie Woodall [address] [facility] [phone]

## 2022-07-24 NOTE — PLAN OF CARE
Problem: PHYSICAL THERAPY ADULT  Goal: Performs mobility at highest level of function for planned discharge setting  See evaluation for individualized goals  Description: Treatment/Interventions: Functional transfer training, LE strengthening/ROM, Therapeutic exercise, Endurance training, Patient/family training, Equipment eval/education, Bed mobility, Gait training  Equipment Recommended: Demetria Guthrie       See flowsheet documentation for full assessment, interventions and recommendations  7/24/2022 1638 by Nat Rodgers PT  Note: Prognosis: Fair  Problem List: Decreased strength, Decreased range of motion, Decreased endurance, Impaired balance, Decreased mobility, Decreased safety awareness, Orthopedic restrictions, Pain  Assessment: Pt is a 80 y o  female seen for PT evaluation s/p admit to [address] [place] [address] on 7/23/2022 w/ Closed fracture of multiple ribs of left side  Order placed for PT  Comorbidities affecting pt's physical performance at time of assessment include: HTN, CHF, and CVA  Personal factors affecting pt at time of IE include: advanced age, inability to perform IADLs, inability to perform ADLs, and recent fall(s)  Prior to admission, pt was  living at [address] and was mod I for ambulation with RW   Upon evaluation: Pt requires mod A for bed mobility, mod A for sit to stand, and min A for steppage transfers with RW  (Please find full objective findings from PT assessment regarding body systems outlined above)  Impairments and limitations also listed above, especially due to  weakness, decreased ROM, impaired balance, decreased endurance, pain, decreased activity tolerance, and fall risk  Pt's clinical presentation is currently unstable/unpredictable seen in pt's presentation of fall risk, new spinal compression fractures/spinal precautions, and significant pain with mobility    Pt to benefit from continued skilled PT tx while in hospital and upon DC to address deficits as defined above and maximize level of functional mobility  From PT/mobility standpoint, recommendation at time of d/c would be  inpatient rehab vs  Return to facility with PT services  pending progress and level of assist available  Recommend  progression of ambulation and initiation of HEP as appropriate   PT Discharge Recommendation: Post acute rehabilitation services (vs  return to facility with PT services)    See flowsheet documentation for full assessment  7/24/2022 1637 by Migdalia Huston PT  Note: Prognosis: Fair  Problem List: Decreased strength, Decreased range of motion, Decreased endurance, Impaired balance, Decreased mobility, Decreased safety awareness, Orthopedic restrictions, Pain  Assessment: Pt is a 80 y o  female seen for PT evaluation s/p admit to [address] [place] [address] on 7/23/2022 w/ Closed fracture of multiple ribs of left side  Order placed for PT  Comorbidities affecting pt's physical performance at time of assessment include: HTN, CHF, and CVA  Personal factors affecting pt at time of IE include: advanced age, inability to perform IADLs, inability to perform ADLs, and recent fall(s)  Prior to admission, pt was  living at [address] and was mod I for ambulation with RW   Upon evaluation: Pt requires mod A for bed mobility, mod A for sit to stand, and min A for steppage transfers with RW  (Please find full objective findings from PT assessment regarding body systems outlined above)  Impairments and limitations also listed above, especially due to  weakness, decreased ROM, impaired balance, decreased endurance, pain, decreased activity tolerance, and fall risk  Pt's clinical presentation is currently unstable/unpredictable seen in pt's presentation of fall risk, new spinal compression fractures/spinal precautions, and significant pain with mobility    Pt to benefit from continued skilled PT tx while in hospital and upon DC to address deficits as defined above and maximize level of functional mobility  From PT/mobility standpoint, recommendation at time of d/c would be  inpatient rehab vs  Return to facility with PT services  pending progress and level of assist available  Recommend  progression of ambulation and initiation of HEP as appropriate   PT Discharge Recommendation: Post acute rehabilitation services (vs  return to facility with PT services)    See flowsheet documentation for full assessment

## 2022-07-25 ENCOUNTER — ANESTHESIA EVENT (INPATIENT)
Dept: ANESTHESIOLOGY | Facility: HOSPITAL | Age: 87
DRG: 184 | End: 2022-07-25
Payer: COMMERCIAL

## 2022-07-25 ENCOUNTER — APPOINTMENT (OUTPATIENT)
Dept: SURGERY | Facility: HOSPITAL | Age: 87
DRG: 184 | End: 2022-07-25
Payer: COMMERCIAL

## 2022-07-25 ENCOUNTER — ANESTHESIA (INPATIENT)
Dept: ANESTHESIOLOGY | Facility: HOSPITAL | Age: 87
DRG: 184 | End: 2022-07-25
Payer: COMMERCIAL

## 2022-07-25 LAB
ANION GAP SERPL CALCULATED.3IONS-SCNC: 4 MMOL/L (ref 4–13)
ANION GAP SERPL CALCULATED.3IONS-SCNC: 4 MMOL/L (ref 4–13)
BUN SERPL-MCNC: 12 MG/DL (ref 5–25)
BUN SERPL-MCNC: 13 MG/DL (ref 5–25)
CALCIUM SERPL-MCNC: 8.4 MG/DL (ref 8.4–10.2)
CALCIUM SERPL-MCNC: 8.6 MG/DL (ref 8.4–10.2)
CHLORIDE SERPL-SCNC: 92 MMOL/L (ref 96–108)
CHLORIDE SERPL-SCNC: 92 MMOL/L (ref 96–108)
CO2 SERPL-SCNC: 29 MMOL/L (ref 21–32)
CO2 SERPL-SCNC: 31 MMOL/L (ref 21–32)
CREAT SERPL-MCNC: 0.64 MG/DL (ref 0.6–1.3)
CREAT SERPL-MCNC: 0.78 MG/DL (ref 0.6–1.3)
GFR SERPL CREATININE-BSD FRML MDRD: 67 ML/MIN/1.73SQ M
GFR SERPL CREATININE-BSD FRML MDRD: 79 ML/MIN/1.73SQ M
GLUCOSE SERPL-MCNC: 144 MG/DL (ref 65–140)
GLUCOSE SERPL-MCNC: 94 MG/DL (ref 65–140)
POTASSIUM SERPL-SCNC: 4 MMOL/L (ref 3.5–5.3)
POTASSIUM SERPL-SCNC: 4.2 MMOL/L (ref 3.5–5.3)
SODIUM SERPL-SCNC: 125 MMOL/L (ref 135–147)
SODIUM SERPL-SCNC: 127 MMOL/L (ref 135–147)

## 2022-07-25 PROCEDURE — 3E0T3BZ INTRODUCTION OF ANESTHETIC AGENT INTO PERIPHERAL NERVES AND PLEXI, PERCUTANEOUS APPROACH: ICD-10-PCS | Performed by: ANESTHESIOLOGY

## 2022-07-25 PROCEDURE — NC001 PR NO CHARGE: Performed by: ANESTHESIOLOGY

## 2022-07-25 PROCEDURE — C9290 INJ, BUPIVACAINE LIPOSOME: HCPCS | Performed by: ANESTHESIOLOGY

## 2022-07-25 PROCEDURE — 80048 BASIC METABOLIC PNL TOTAL CA: CPT | Performed by: PHYSICIAN ASSISTANT

## 2022-07-25 PROCEDURE — 99232 SBSQ HOSP IP/OBS MODERATE 35: CPT | Performed by: SURGERY

## 2022-07-25 PROCEDURE — 99222 1ST HOSP IP/OBS MODERATE 55: CPT | Performed by: INTERNAL MEDICINE

## 2022-07-25 RX ORDER — BUPIVACAINE HYDROCHLORIDE 2.5 MG/ML
INJECTION, SOLUTION EPIDURAL; INFILTRATION; INTRACAUDAL
Status: COMPLETED | OUTPATIENT
Start: 2022-07-25 | End: 2022-07-25

## 2022-07-25 RX ORDER — HEPARIN SODIUM 5000 [USP'U]/ML
5000 INJECTION, SOLUTION INTRAVENOUS; SUBCUTANEOUS EVERY 8 HOURS SCHEDULED
Status: DISCONTINUED | OUTPATIENT
Start: 2022-07-25 | End: 2022-07-30 | Stop reason: HOSPADM

## 2022-07-25 RX ORDER — HEPARIN SODIUM 5000 [USP'U]/ML
5000 INJECTION, SOLUTION INTRAVENOUS; SUBCUTANEOUS EVERY 8 HOURS SCHEDULED
Status: CANCELLED | OUTPATIENT
Start: 2022-07-25

## 2022-07-25 RX ADMIN — SODIUM CHLORIDE 1 G: 1 TABLET ORAL at 09:49

## 2022-07-25 RX ADMIN — BUPIVACAINE 20 ML: 13.3 INJECTION, SUSPENSION, LIPOSOMAL INFILTRATION at 09:04

## 2022-07-25 RX ADMIN — SODIUM CHLORIDE 1 G: 1 TABLET ORAL at 13:04

## 2022-07-25 RX ADMIN — ACETAMINOPHEN 975 MG: 325 TABLET ORAL at 05:39

## 2022-07-25 RX ADMIN — ACETAMINOPHEN 975 MG: 325 TABLET ORAL at 13:03

## 2022-07-25 RX ADMIN — HEPARIN SODIUM 5000 UNITS: 5000 INJECTION INTRAVENOUS; SUBCUTANEOUS at 18:00

## 2022-07-25 RX ADMIN — DILTIAZEM HYDROCHLORIDE 120 MG: 60 TABLET, FILM COATED ORAL at 13:03

## 2022-07-25 RX ADMIN — METHOCARBAMOL 250 MG: 500 TABLET ORAL at 17:21

## 2022-07-25 RX ADMIN — LIDOCAINE 5% 2 PATCH: 700 PATCH TOPICAL at 09:54

## 2022-07-25 RX ADMIN — DILTIAZEM HYDROCHLORIDE 120 MG: 60 TABLET, FILM COATED ORAL at 01:00

## 2022-07-25 RX ADMIN — MEMANTINE 10 MG: 10 TABLET ORAL at 09:50

## 2022-07-25 RX ADMIN — DOCUSATE SODIUM 100 MG: 100 CAPSULE, LIQUID FILLED ORAL at 09:50

## 2022-07-25 RX ADMIN — METHOCARBAMOL 250 MG: 500 TABLET ORAL at 05:40

## 2022-07-25 RX ADMIN — FUROSEMIDE 20 MG: 20 TABLET ORAL at 09:50

## 2022-07-25 RX ADMIN — DICLOFENAC SODIUM TOPICAL GEL, 1%, 2 G: 10 GEL TOPICAL at 09:52

## 2022-07-25 RX ADMIN — PANTOPRAZOLE SODIUM 40 MG: 40 TABLET, DELAYED RELEASE ORAL at 09:50

## 2022-07-25 RX ADMIN — BUPIVACAINE HYDROCHLORIDE 30 ML: 2.5 INJECTION, SOLUTION EPIDURAL; INFILTRATION; INTRACAUDAL at 09:04

## 2022-07-25 RX ADMIN — SENNOSIDES 17.2 MG: 8.6 TABLET, FILM COATED ORAL at 09:49

## 2022-07-25 RX ADMIN — SODIUM CHLORIDE 1 G: 1 TABLET ORAL at 17:21

## 2022-07-25 RX ADMIN — METHOCARBAMOL 250 MG: 500 TABLET ORAL at 00:58

## 2022-07-25 RX ADMIN — METHOCARBAMOL 250 MG: 500 TABLET ORAL at 13:04

## 2022-07-25 RX ADMIN — AMLODIPINE BESYLATE 2.5 MG: 2.5 TABLET ORAL at 09:50

## 2022-07-25 RX ADMIN — DILTIAZEM HYDROCHLORIDE 120 MG: 60 TABLET, FILM COATED ORAL at 23:44

## 2022-07-25 RX ADMIN — LEVOTHYROXINE SODIUM 50 MCG: 50 TABLET ORAL at 05:39

## 2022-07-25 RX ADMIN — DICLOFENAC SODIUM TOPICAL GEL, 1%, 2 G: 10 GEL TOPICAL at 13:04

## 2022-07-25 RX ADMIN — ACETAMINOPHEN 975 MG: 325 TABLET ORAL at 22:34

## 2022-07-25 RX ADMIN — DOCUSATE SODIUM 100 MG: 100 CAPSULE, LIQUID FILLED ORAL at 17:21

## 2022-07-25 RX ADMIN — HEPARIN SODIUM 5000 UNITS: 5000 INJECTION INTRAVENOUS; SUBCUTANEOUS at 22:37

## 2022-07-25 RX ADMIN — DICLOFENAC SODIUM TOPICAL GEL, 1%, 2 G: 10 GEL TOPICAL at 22:34

## 2022-07-25 RX ADMIN — MEMANTINE 10 MG: 10 TABLET ORAL at 17:21

## 2022-07-25 RX ADMIN — DICLOFENAC SODIUM TOPICAL GEL, 1%, 2 G: 10 GEL TOPICAL at 17:22

## 2022-07-25 RX ADMIN — ATORVASTATIN CALCIUM 20 MG: 20 TABLET, FILM COATED ORAL at 09:50

## 2022-07-25 NOTE — PROGRESS NOTES
[facility]  Progress Note - Blanquita Gold 1933, 80 y o  female MRN: [id number]  Unit/Bed#: S -01 Encounter: [id number]  Primary Care Provider: Sb Wesley MD   Date and time admitted to hospital: 7/23/2022 10:43 AM    Fall  Assessment & Plan  - Status post fall with the below noted injuries  - Fall precautions  - Geriatric Medicine consultation for evaluation, medication review and recommendations   - PT and OT evaluation and treatment as indicated  - Case Management consultation for disposition planning  * Closed fracture of multiple ribs of left side  Assessment & Plan  - Multiple left-sided rib fractures (5 & 7), present on admission   - Continue rib fracture protocol   - Continue to encourage incentive spirometer use and adequate pulmonary hygiene  Currently pulling 750 mL on I S  Flutter valve also given to patient  - PIC score is 7   - Continue multimodal analgesic regimen  Appreciate APS evaluation and recommendations   - Supplemental oxygen via nasal cannula as needed to maintain saturations greater than or equal to 94%  - Repeat chest x-ray on 7/24/2022 pending    - PT and OT evaluation and treatment as indicated  - Outpatient follow-up in the trauma clinic for re-evaluation in approximately 2 weeks  Closed wedge compression fracture of T3 vertebra (HCC)  Assessment & Plan  - Mild T3 compression fracture, present on admission, with progression when compared to prior imaging   - Appreciate Neurosurgery evaluation and recommendations  Non-operative management recommended  - Bracing: Initially a TLSO brace was ordered  After discussion with Neurosurgery, bracing for this fracture will be difficult due to level of fracture  Will discuss further with patient and her family  Would likely need cervical collar and TLSO brace or a  brace, but neither may cover the fracture level well per Neurosurgery    - Monitor neurovascular exam   - Multimodal analgesic regimen as needed  - PT and OT evaluation and treatment as indicated  - Outpatient follow up with Neurosurgery for re-evaluation  Hyponatremia  Assessment & Plan  - Significant hyponatremia, present on presentation, with sodium of 127   - Sodium down trended as low as 124 on 07/24/2022   - Unclear etiology, possibly secondary to dehydration  No obvious medication gauze  Based on workup, patient does appear euvolemic with hyponatremia   - Salt tabs 1 g 3 times daily was initiated on 07/24/2022  Sodium has improved with initiation of salt tabs  - Continue to monitor sodium with serial BMP   - Outpatient follow-up with PCP  CKD (chronic kidney disease) stage 3, GFR 30-59 ml/min 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   - No evidence of acute kidney injury at this time  - Monitor volume status and renal function with BMP  - Avoid nephrotoxic medications and hypotension   - Outpatient follow-up per routine  Chronic congestive heart failure (HCC)  Assessment & Plan  Wt Readings from Last 3 Encounters:   07/14/22 85 1 kg (187 lb 9 8 oz)     - Patient with chronic history of CHF without evidence of acute exacerbation   - Continue home medication regimen  - Monitor volume status   - Outpatient follow-up per routine  HTN (hypertension)  Assessment & Plan  - Chronic history of hypertension   - Continue home medication regimen as indicated  - Outpatient follow-up per routine      Hypothyroid  Assessment & Plan  - Patient with chronic history of hypothyroidism   - Continue home medication regimen   - Outpatient follow-up per routine  Disposition:  Continue current level of care  Not yet appropriate for discharge secondary to management of acute hyponatremia  Continue therapy evaluation and treatment as indicated  Case Management following for disposition planning  SUBJECTIVE:  Chief Complaint:  I am feeling better      Subjective:  Patient continues to feel better each day  She still has some pain in her left upper chest wall, left shoulder and mid upper back, but her pain seems to be improving  She has no new complaints this morning  She notes she is tolerating her diet without nausea or vomiting  She denies any shortness of breath or difficulty breathing  OBJECTIVE:   Vitals:   Temp:  [97 2 °F (36 2 °C)-98 3 °F (36 8 °C)] 97 2 °F (36 2 °C)  HR:  [69-91] 85  Resp:  [18-20] 20  BP: (109-142)/(53-77) 142/64    Intake/Output:  I/O       07/23 0701  07/24 0700 07/24 0701  07/25 0700 07/25 0701  07/26 0700    P  O  240 400     IV Piggyback  500     Total Intake 240 900     Urine 1290 950     Total Output 1290 950     Net -1050 -50                 Nutrition: Diet Regular; Regular House  GI Proph/Bowel Reg:  On Protonix for GI regimen  On Colace, MiraLax and senna for bowel regimen  VTE Prophylaxis:Sequential compression device (Venodyne)  chemical DVT prophylaxis temporarily held for possible intervention by APS; plan to resume subcutaneous heparin and/or Lovenox as appropriate after discussion with APS  Physical Exam:   GENERAL APPEARANCE: Patient in no acute distress  HEENT: NCAT; PERRL, EOMs intact; Mucous membranes moist  NECK / BACK:  Mild tenderness over the upper midline thoracic spine without step-off or deformity  No other neck or back tenderness  CV: Regular rate and rhythm; no murmur/gallops/rubs appreciated  CHEST / LUNGS: Clear to auscultation; no wheezes/rales/rhonci  Minimal left upper anterior and lateral chest wall tenderness without crepitus or deformity    ABD: NABS; soft; non-distended; non-tender  :  Voiding spontaneously  EXT: +2 pulses bilaterally upper & lower extremities; no edema  Persistent, but improving tenderness over the left shoulder with some persistent pain with range of motion, no deformity or external signs of trauma with intact neurovascular exam throughout the left upper extremity noted  NEURO: GCS 15; no focal neurologic deficits; neurovascularly intact  SKIN: Warm, dry and well perfused; no rash; no jaundice  Invasive Devices  Report    Peripheral Intravenous Line  Duration           Peripheral IV 07/23/22 Left Antecubital 1 day          Drain  Duration           External Urinary Catheter 1 day                 PIC Score  PIC Pain Score: 3 (7/25/2022  5:39 AM)  [facility] Incentive Spirometry Score: Below alert volume (7/25/2022  2:00 AM)  PIC Cough Description: Weak (7/25/2022  2:00 AM)  PIC Total Score: 7 (7/25/2022  2:00 AM)       If the Total PIC Score </=5, did you consult APS and evaluate patient for further intervention?: yes      Pain:    Incentive Spirometry  Cough  3 = Controlled  4 = Above goal volume 3 = Strong  2 = Moderate  3 = Goal to alert volume 2 = Weak  1 = Severe  2 = Below alert volume 1 = Absent     1 = Unable to perform IS         Lab Results:   Results: I have personally reviewed all pertinent laboratory/tests results, BMP/CMP:   Lab Results   Component Value Date    SODIUM 127 (L) 07/25/2022    K 4 2 07/25/2022    CL 92 (L) 07/25/2022    CO2 31 07/25/2022    BUN 12 07/25/2022    CREATININE 0 64 07/25/2022    CALCIUM 8 6 07/25/2022    EGFR 79 07/25/2022   , CBC: No results found for: WBC, HGB, HCT, MCV, PLT, ADJUSTEDWBC, MCH, MCHC, RDW, MPV, NRBC and Coagulation: No results found for: PT, INR, APTT  Imaging/EKG Studies: I have personally reviewed pertinent reports       Other Studies: N/A    Leatha Starks PA-C  7/25/2022 08:36 AM

## 2022-07-25 NOTE — ANESTHESIA PROCEDURE NOTES
Peripheral Block    Patient location during procedure: pre-op  Start time: 7/25/2022 9:04 AM  Reason for block: at surgeon's request and post-op pain management  Staffing  Performed: Anesthesiologist   Anesthesiologist: Imelda Murphy MD  Preanesthetic Checklist  Completed: patient identified, IV checked, site marked, risks and benefits discussed, surgical consent, monitors and equipment checked, pre-op evaluation and timeout performed  Peripheral Block  Patient position: sitting  Prep: ChloraPrep  Patient monitoring: continuous pulse ox  Anesthesia block type: erector spinae and paravertebral   Laterality: left  Injection technique: single-shot  Procedures: ultrasound guided, Ultrasound guidance required for the procedure to increase accuracy and safety of medication placement and decrease risk of complications    Ultrasound permanent image savedbupivacaine (PF) (MARCAINE) 0 25 % 10 mL - Perineural   30 mL - 7/25/2022 9:04:00 AM  Needle  Needle type: Stimuplex   Needle gauge: 22 G  Needle length: 10 cm  Needle localization: ultrasound guidance  Needle insertion depth: 5 cm  Assessment  Injection assessment: incremental injection, local visualized surrounding nerve on ultrasound, negative aspiration for heme and no paresthesia on injection  Paresthesia pain: none  Heart rate change: no  Slow fractionated injection: yes  Post-procedure:  site cleaned  patient tolerated the procedure well with no immediate complications

## 2022-07-25 NOTE — CASE MANAGEMENT
Case Management Assessment & Discharge Planning Note    Patient name Madai Ramsey  Location S /S -01 MRN [id number]  : 1933 Date 2022       Current Admission Date: 2022  Current Admission Diagnosis:Closed fracture of multiple ribs of left side   Patient Active Problem List    Diagnosis Date Noted    Fall 2022    Closed fracture of multiple ribs of left side 2022    Closed wedge compression fracture of T3 vertebra ([facility] [address] ) 2022    HTN (hypertension) 2022    CKD (chronic kidney disease) stage 3, GFR 30-59 ml/min ([facility]) 2022    Chronic congestive heart failure ([facility] [address] ) 2022    Hypothyroid 2022    Hyponatremia 2022      LOS (days): 2  Geometric Mean LOS (GMLOS) (days):   Days to GMLOS:     OBJECTIVE:    Risk of Unplanned Readmission Score: 11 96         Current admission status: Inpatient       Preferred Pharmacy: No Pharmacies Listed  Primary Care Provider: Ophelia Sky MD    Primary Insurance: [facility]  [facility]  Secondary Insurance:     ASSESSMENT:  Frida [address] Proxies    There are no active Health Care Proxies on file  Readmission Root Cause  30 Day Readmission: No    Patient Information  Admitted from[de-identified] Home  Mental Status: Alert  During Assessment patient was accompanied by: Son  Assessment information provided by[de-identified] Son  Primary Caregiver: Other (Comment) Margret Monk)  Caregiver's Name[de-identified] Staff at [facility]  Caregiver's Relationship to Patient[de-identified] Significant Other  Support Systems: Son  Home entry access options   Select all that apply : No steps to enter home  Type of Current Residence: Facility ([facility])  Upon entering residence, is there a bedroom on the main floor (no further steps)?: Yes  Upon entering residence, is there a bathroom on the main floor (no further steps)?: Yes  In the last 12 months, was there a time when you were not able to pay the mortgage or rent on time?: No  In the last 12 months, was there a time when you did not have a steady place to sleep or slept in a shelter (including now)?: No  Homeless/housing insecurity resource given?: N/A  Living Arrangements: Lives Alone    Activities of Daily Living Prior to Admission  Functional Status: Assistance  Completes ADLs independently?: No  Level of ADL dependence: Assistance (dressing and bathing at assisted living )  Ambulates independently?: Yes  Does patient use assisted devices?: Yes  Assisted Devices (DME) used: [employer]  Does patient currently own DME?: Yes  What DME does the patient currently own?: [employer]ia [employer]  Does patient have a history of Outpatient Therapy (PT/OT)?: No  Does the patient have a history of Short-Term Rehab?: Yes  Does patient have a history of HHC?: No  Does patient currently have [facility]?: No         Patient Information Continued  Income Source: Pension/FDC  Does patient have prescription coverage?: Yes  Within the past 12 months, you worried that your food would run out before you got the money to buy more : Never true  Within the past 12 months, the food you bought just didn't last and you didn't have money to get more : Never true  Food insecurity resource given?: N/A  Does patient receive dialysis treatments?: No  Does patient have a history of substance abuse?: No  Does patient have a history of Mental Health Diagnosis?: No         Means of Transportation  Means of Transport to Appts[de-identified] Family transport  In the past 12 months, has lack of transportation kept you from medical appointments or from getting medications?: No  In the past 12 months, has lack of transportation kept you from meetings, work, or from getting things needed for daily living?: No  Was application for public transport provided?: N/A        DISCHARGE DETAILS:    Discharge planning discussed with[de-identified] patient and son     Comments - Freedom of Choice: Cm provided patient's son with blanket list of referrals placed over the weekend  Cm met with son to discuss this AM  Cm followed up in the afternoon and confirmed that they wanted to add additional referral of [facility]  Cm sent referral  Facility able to accept  Cm informed son  CM contacted family/caregiver?: Yes  Were Treatment Team discharge recommendations reviewed with patient/caregiver?: Yes  Did patient/caregiver verbalize understanding of patient care needs?: Yes  Were patient/caregiver advised of the risks associated with not following Treatment Team discharge recommendations?: Yes    Contacts  Patient Contacts: Montez Pedraza  Relationship to Patient[de-identified] Family  Contact Method: Phone  Phone Number: [phone]  Reason/Outcome: Continuity of Care, Emergency Contact, Discharge [address]         Is the patient interested in [facility] at discharge?: No    DME Referral Provided  Referral made for DME?: No    Other Referral/Resources/Interventions Provided:  Referral Comments: After blanket referral of STR was placed  List provided to son for reivew  Cm checked in with son again in afternoon and son in agreement with referral to [facility]  Facility reportedly able to accept  Awaiting COVID dates  Will need insurance authorization           Treatment Team Recommendation: Short Term Rehab  Discharge Destination Plan[de-identified] Short Term Rehab  Transport at Discharge : Wheelchair [place]

## 2022-07-25 NOTE — CONSULTS
Consultation - 1926 [facility] Dewaine Ryan 80 y o  female MRN: [id number]  Unit/Bed#: S -01 Encounter: [id number]        Inpatient consult to Gerontology  Consult performed by: Yoli Alejandre MD  Consult ordered by: Ramírez Sellers PA-C            Assessment/Plan     1  Closed fracture of multiple ribs of left side- 5th and 7th rib fractured present on admission  Continue to encourage incentive spirometer use and adequate pulmonary hygiene  Currently pulling ? ML on I S  Continue multimodal analgesic regimen and appreciate APS evaluation and recommendations  Follow-up in the trauma clinic for re-evaluation in approximately 2 weeks  -continue pain control per Geriatric pain protocol:  Tylenol 975mg Q8H scheduled  Roxicodone 2 5mg Q4H PRN moderate pain  Roxicodone 5mg Q4H PRN severe pain  Dilaudid 0 2mg Q2H PRN  -continue adjuncts such as Gabapentin 100mg HS and lidocaine patch topically  -encourage addition of non-pharmacologic pain treatment including ice and frequent repositioning  -recommend  bowel regimen to prevent and treat constipation due to increased risk with acute pain and opiate pain medications  2  Closed wedge compression fracture of T3 vertebra-CT imaging demonstrates mild progression to a known T3 fracture  Non-operative management recommended, while focusing on listening to neurosurgery  Discuss with the patient and family about using a cervical collar and TLSO brace or a  brace  Continue multimodal analgesic regimen  3  Hyponatremia-Significant hyponatremia on presentation with level of 127  Possibly secondary to siadh,  patient does appear euvolemic with hyponatremia  Salt tabs 1 g TID was initiated on 07/24/2022  Continue to monitor sodium with serial BMP    4  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-Patient has 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, and there is no evidence of acute kidney injury at this time   Continue to monitor volume status and renal function with BMP while avoiding nephrotoxic medications and hypotension  5  Chronic congetstive heart failure- Patient has a chronic history of CHF without evidence of acute exacerbation  Continue home medication regimen and monitoring volume status    6  Hypertension- Patient has a chronic history of hypertension  Continue home medication regimen as indicated  7  Hypothyroidism- Patient has a chronic history of hypothyroidism  Continue home medication regimen  8  Fall- Precautions need to be taken to avoid future falls  History of Present Illness   Physician Requesting Consult: Jeanna Nelson DO  Reason for Consult / Principal Problem: Fall with fracture  Hx and PE limited by: No limitations  Additional history obtained from: Jose Moses (son), [phone], provided an excellent history      HPI: Mily Bradshaw is an 80y o  year old female with a past medical history of hypertension, CKD III, HFpEF, and hypothyroidism who presents with a closed wedge compression fracture of T3, along with 5th and 7th left rib fractures due to a mechanical fall 1 week ago  The patient is still complaining of some pain in her left shoulder, upper chest wall, and mid upper back; the pain is improving each day, though, and she has had no new recent complaints  CT imaging demonstrates mild progression to a known T3 fracture, which led to a single shot of exparel a local anesthetic to the erector spinae and paravertebral blocks at approximate T5/6 levels  Stable compression fractures of T4 and T9 vertebral bodies  She tolerated the procedure well with no immediate complications  She has been tolerating her diet without vomiting  The patient has significant hyponatremia with sodium being 125, and chronic hypertension with blood pressure being 141/71, and chronic renal insufficiency stage 2 with eGFR being 67  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 revealing a urine sodium of 30 and urine osmolality of 295   Patient currently taking sodium chloride tablet 1 g 3 times a day  CT of the chest revealed Diffuse honeycombing is present throughout the lungs bilaterally  Bronchiectatic changes and fibrotic changes are present throughout the lungs bilaterally, similar to the prior study  CT of the cervical spine revealed  Straightening and reversal of the cervical lordotic curvature  Deformity of the left humeral head consistent with old trauma was seen in left shoulder on radiography  The patient lives in an assisted living facility in [place] [address] [address]        Review of Systems   Constitutional: Positive for chills  HENT: Negative  Eyes: Negative  Respiratory: Negative  Cardiovascular: Negative  Gastrointestinal: Negative  Endocrine: Negative  Genitourinary: Positive for difficulty urinating  Musculoskeletal: Negative  Skin: Negative  Allergic/Immunologic: Negative  Neurological: Negative  Hematological: Negative  Psychiatric/Behavioral:        Patient's son states she is confused more often recently       Memory/Cognitive screening: Her short-term memory is poor for the last year  Mobility: Mobility has decreased greatly in the last few months  Falls:  She fell off of a small step three weeks ago; she fell where she lives about 10 days ago  Assistive Devices: She does use a walker solely for the last few months  Fraility: She has become frail  Nutrition/weight loss/grocery shopping/meal preparation: Good nutrition  Vision impairment: She has reading glasses  Hearing impairment: No hearing impairments  Incontinence: She is incontinent   Delirium: No history of delirium   Polypharmacy:   No current facility-administered medications on file prior to encounter       Current Outpatient Medications on File Prior to Encounter   Medication Sig Dispense Refill    amLODIPine (NORVASC) 2 5 mg tablet Take 2 5 mg by mouth daily      atorvastatin (LIPITOR) 20 mg tablet Take 20 mg by mouth daily      clopidogrel (PLAVIX) 75 mg tablet Take 75 mg by mouth daily      cyanocobalamin 1,000 mcg/mL Inject 1,000 mcg into a muscle every 30 (thirty) days      Diclofenac Sodium (VOLTAREN) 1 % Apply 2 g topically 4 (four) times a day      diltiazem (CARDIZEM) 120 MG tablet Take 120 mg by mouth every 6 (six) hours      furosemide (LASIX) 20 mg tablet Take 20 mg by mouth 2 (two) times a day      glucose blood test strip Use 1 each daily as needed Use as instructed      levothyroxine 50 mcg tablet Take 50 mcg by mouth daily      memantine (NAMENDA) 10 mg tablet Take 10 mg by mouth 2 (two) times a day      Multiple Vitamins-Minerals ([employer]) TABS Take by mouth      pantoprazole (PROTONIX) 40 mg tablet Take 40 mg by mouth daily      acetaminophen (TYLENOL) 325 mg tablet Take 2 tablets (650 mg total) by mouth every 6 (six) hours as needed for mild pain 30 tablet 0     Patients primary residence:Assisted living facility Lives with herself  iADL's:  She does not enjoy instrumental activities of daily living  ADL's: She does not enjoy activities of daily living     Historical Information   Past medical history: Congestive heart failure, Dementia without behavioral disturbance, High cholesterol, Hypertension, Hypothyroidism, Stage 3 chronic kidney disease, Stroke, Hyponatremia  Past surgical history: Appendectomy, Cholecystectomy, Nephrectomy, Total abdominal hysterectomy with bilateral salpingoophorectomy   Social history:  Family history:     Meds/Allergies   All current active meds have been reviewed  No current facility-administered medications on file prior to encounter       Current Outpatient Medications on File Prior to Encounter   Medication Sig Dispense Refill    amLODIPine (NORVASC) 2 5 mg tablet Take 2 5 mg by mouth daily      atorvastatin (LIPITOR) 20 mg tablet Take 20 mg by mouth daily      clopidogrel (PLAVIX) 75 mg tablet Take 75 mg by mouth daily      cyanocobalamin 1,000 mcg/mL Inject 1,000 mcg into a muscle every 30 (thirty) days  Diclofenac Sodium (VOLTAREN) 1 % Apply 2 g topically 4 (four) times a day      diltiazem (CARDIZEM) 120 MG tablet Take 120 mg by mouth every 6 (six) hours      furosemide (LASIX) 20 mg tablet Take 20 mg by mouth 2 (two) times a day      glucose blood test strip Use 1 each daily as needed Use as instructed      levothyroxine 50 mcg tablet Take 50 mcg by mouth daily      memantine (NAMENDA) 10 mg tablet Take 10 mg by mouth 2 (two) times a day      Multiple Vitamins-Minerals (S[employer] Senior) TABS Take by mouth      pantoprazole (PROTONIX) 40 mg tablet Take 40 mg by mouth daily      acetaminophen (TYLENOL) 325 mg tablet Take 2 tablets (650 mg total) by mouth every 6 (six) hours as needed for mild pain 30 tablet 0       Allergies   Allergen Reactions    Penicillins Hives       Objective   Vitals:    07/25/22 1045   BP: 141/71   Pulse: 91   Resp: 20   Temp: 97 6 °F (36 4 °C)   SpO2: 95%       Intake/Output Summary (Last 24 hours) at 7/25/2022 1351  Last data filed at 7/25/2022 0501  Gross per 24 hour   Intake 600 ml   Output 950 ml   Net -350 ml     Invasive Devices  Report    Peripheral Intravenous Line  Duration           Peripheral IV 07/23/22 Left Antecubital 2 days          Drain  Duration           External Urinary Catheter 2 days                Physical Exam  Vitals and nursing note reviewed  Constitutional:       General: She is not in acute distress  Appearance: Normal appearance  She is well-developed and normal weight  HENT:      Head: Normocephalic and atraumatic  Right Ear: Tympanic membrane, ear canal and external ear normal       Left Ear: Tympanic membrane, ear canal and external ear normal    Eyes:      Conjunctiva/sclera: Conjunctivae normal    Cardiovascular:      Rate and Rhythm: Normal rate and regular rhythm  Heart sounds: No murmur heard  Pulmonary:      Effort: Pulmonary effort is normal  No respiratory distress  Breath sounds: Normal breath sounds     Abdominal: Palpations: Abdomen is soft  Tenderness: There is no abdominal tenderness  Musculoskeletal:      Cervical back: Neck supple  Skin:     General: Skin is warm and dry  Neurological:      Mental Status: She is alert  Lab Results:   I have personally reviewed pertinent lab and imaging results       VTE Prophylaxis: SCD    Code Status: Level 1 - Full Code  Advance Directive and Living Will:      Power of : Avie Court (son)  POLST:      Family and Social Support:   Discharge planning discussed with[de-identified] Patient and son  Freedom of Choice: Yes

## 2022-07-25 NOTE — ASSESSMENT & PLAN NOTE
- Significant hyponatremia, present on presentation, with sodium of 127   - Sodium down trended as low as 124 on 07/24/2022   - Unclear etiology, possibly secondary to dehydration  No obvious medication gauze  Based on workup, patient does appear euvolemic with hyponatremia   - Salt tabs 1 g 3 times daily was initiated on 07/24/2022  Sodium has improved with initiation of salt tabs  - Continue to monitor sodium with serial BMP   - Outpatient follow-up with PCP

## 2022-07-25 NOTE — PROGRESS NOTES
Progress Note - Acute Pain Service    Aidee Che 80 y o  female MRN: [id number]  Unit/Bed#: S -01 Encounter: [id number]      Assessment:   Principal Problem:    Closed fracture of multiple ribs of left side  Active Problems:    Fall    Closed wedge compression fracture of T3 vertebra (HCC)    HTN (hypertension)    CKD (chronic kidney disease) stage 3, GFR 30-59 ml/min (HCC)    Chronic congestive heart failure (HCC)    Hypothyroid    Hyponatremia    Aidee Che is a 80 y o  female with PMhx of HTN, CKD III, and HFpEF who presented with left 5 +7 rib fractures + multiple thoracic compression fractures after mechanical fall  APS consulted for post-traumatic pain needs  Although she states she is improving each day, she continues to complain of significant pain with PT, particularly with left arm movement and reaching  I think an epidural would potentially delay discharge, but a single shot block might improve her pain and facilitate better PT participation  Plan:   1  Single shot exparel erector spinae and paravertebral blocks were performed at approximate T5/6 level  2  Would discontinue IV opioids at this point  3  Continue multimodal anesthesia robaxin, lidoderm patch, and acetaminophen  4  Continue to offer oxycodone 2 5 mg PO prior to PT  APS will continue to follow; please contact APS ( [phone]) with any further questions    Pain History  Current pain location(s): left chest  Pain Scale:   5/10  Quality: throbbing  24 hour history: no acute events    Opioid requirement previous 24 hours: 2 doses 2 5 mg PO oxycodone    Meds/Allergies   all current active meds have been reviewed    Allergies   Allergen Reactions    Penicillins Hives       Objective     Temp:  [97 2 °F (36 2 °C)-98 3 °F (36 8 °C)] 97 2 °F (36 2 °C)  HR:  [69-91] 88  Resp:  [18-20] 20  BP: (119-142)/(53-77) 139/63    Physical Exam  Constitutional:       Appearance: Normal appearance   She is normal weight  HENT:      Head: Normocephalic and atraumatic  Nose: Nose normal       Mouth/Throat:      Mouth: Mucous membranes are moist    Cardiovascular:      Rate and Rhythm: Normal rate  Pulmonary:      Effort: Pulmonary effort is normal    Chest:      Chest wall: Tenderness present  Skin:     General: Skin is warm and dry  Neurological:      General: No focal deficit present  Mental Status: She is alert and oriented to person, place, and time  Mental status is at baseline  Psychiatric:         Mood and Affect: Mood normal          Behavior: Behavior normal          Thought Content: Thought content normal          Judgment: Judgment normal          Lab Results:   Results from last 7 days   Lab Units 07/24/22  0515   WBC Thousand/uL 8 96   HEMOGLOBIN g/dL 10 7*   HEMATOCRIT % 32 5*   PLATELETS Thousands/uL 292      Results from last 7 days   Lab Units 07/25/22  0507 07/24/22  0515 07/23/22  1205   POTASSIUM mmol/L 4 2   < > 4 2   CHLORIDE mmol/L 92*   < > 89*   CO2 mmol/L 31   < > 29   BUN mg/dL 12   < > 7   CREATININE mg/dL 0 64   < > 0 48*   CALCIUM mg/dL 8 6   < > 9 2   ALK PHOS U/L  --   --  137*   ALT U/L  --   --  15   AST U/L  --   --  21    < > = values in this interval not displayed  Imaging Studies: I have personally reviewed pertinent reports  EKG, Pathology, and Other Studies: I have personally reviewed pertinent reports          Tawnya Gonzalez MD

## 2022-07-25 NOTE — ASSESSMENT & PLAN NOTE
Lab Results   Component Value Date    EGFR 79 07/25/2022    EGFR 70 07/24/2022    EGFR 77 07/24/2022    CREATININE 0 64 07/25/2022    CREATININE 0 75 07/24/2022    CREATININE 0 68 07/24/2022        - Patient with history of CKD stage 3 and baseline creatinine around 0 9  Patient follows with [facility] as outpatient  - Patient also known to 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   - No evidence of acute kidney injury at this time  - Monitor volume status and renal function with BMP  - Avoid nephrotoxic medications and hypotension   - Outpatient follow-up per routine

## 2022-07-25 NOTE — CASE MANAGEMENT
Case Management Progress Note    Patient name Kaye Hammonds  [facility] S /S -01 MRN [id number]  : 1933 Date 2022       LOS (days): 2  Geometric Mean LOS (GMLOS) (days):   Days to GMLOS:        OBJECTIVE:        Current admission status: Inpatient  Preferred Pharmacy: No Pharmacies Listed  Primary Care Provider: Susan Ortega MD    Primary Insurance: [facility]  Secondary Insurance:     PROGRESS NOTE:    Cm confirmed that patient has a bed at [facility]  Cm was able to obtain COVID card from [facility] then provided through fax attach the COVID information to Kathi Carrasco Cm placed request for insurance authorization with  DC support staff  Cm awaiting authorization

## 2022-07-26 ENCOUNTER — APPOINTMENT (INPATIENT)
Dept: RADIOLOGY | Facility: HOSPITAL | Age: 87
DRG: 184 | End: 2022-07-26
Payer: COMMERCIAL

## 2022-07-26 LAB
ANION GAP SERPL CALCULATED.3IONS-SCNC: 3 MMOL/L (ref 4–13)
ANION GAP SERPL CALCULATED.3IONS-SCNC: 4 MMOL/L (ref 4–13)
BUN SERPL-MCNC: 12 MG/DL (ref 5–25)
BUN SERPL-MCNC: 14 MG/DL (ref 5–25)
CALCIUM SERPL-MCNC: 8.5 MG/DL (ref 8.4–10.2)
CALCIUM SERPL-MCNC: 8.7 MG/DL (ref 8.4–10.2)
CHLORIDE SERPL-SCNC: 91 MMOL/L (ref 96–108)
CHLORIDE SERPL-SCNC: 92 MMOL/L (ref 96–108)
CO2 SERPL-SCNC: 30 MMOL/L (ref 21–32)
CO2 SERPL-SCNC: 31 MMOL/L (ref 21–32)
CORTIS AM PEAK SERPL-MCNC: 16.3 UG/DL (ref 4.2–22.4)
CREAT SERPL-MCNC: 0.7 MG/DL (ref 0.6–1.3)
CREAT SERPL-MCNC: 0.72 MG/DL (ref 0.6–1.3)
GFR SERPL CREATININE-BSD FRML MDRD: 74 ML/MIN/1.73SQ M
GFR SERPL CREATININE-BSD FRML MDRD: 77 ML/MIN/1.73SQ M
GLUCOSE SERPL-MCNC: 108 MG/DL (ref 65–140)
GLUCOSE SERPL-MCNC: 88 MG/DL (ref 65–140)
OSMOLALITY UR/SERPL-RTO: 271 MMOL/KG (ref 282–298)
POTASSIUM SERPL-SCNC: 4.2 MMOL/L (ref 3.5–5.3)
POTASSIUM SERPL-SCNC: 4.3 MMOL/L (ref 3.5–5.3)
SODIUM SERPL-SCNC: 125 MMOL/L (ref 135–147)
SODIUM SERPL-SCNC: 126 MMOL/L (ref 135–147)
TSH SERPL DL<=0.05 MIU/L-ACNC: 1.83 UIU/ML (ref 0.45–4.5)

## 2022-07-26 PROCEDURE — 80048 BASIC METABOLIC PNL TOTAL CA: CPT | Performed by: PHYSICIAN ASSISTANT

## 2022-07-26 PROCEDURE — 82533 TOTAL CORTISOL: CPT | Performed by: PHYSICIAN ASSISTANT

## 2022-07-26 PROCEDURE — 84443 ASSAY THYROID STIM HORMONE: CPT | Performed by: NURSE PRACTITIONER

## 2022-07-26 PROCEDURE — 99232 SBSQ HOSP IP/OBS MODERATE 35: CPT | Performed by: ANESTHESIOLOGY

## 2022-07-26 PROCEDURE — 99223 1ST HOSP IP/OBS HIGH 75: CPT | Performed by: INTERNAL MEDICINE

## 2022-07-26 PROCEDURE — 99232 SBSQ HOSP IP/OBS MODERATE 35: CPT | Performed by: SURGERY

## 2022-07-26 PROCEDURE — 97167 OT EVAL HIGH COMPLEX 60 MIN: CPT

## 2022-07-26 PROCEDURE — 83930 ASSAY OF BLOOD OSMOLALITY: CPT | Performed by: PHYSICIAN ASSISTANT

## 2022-07-26 PROCEDURE — 72072 X-RAY EXAM THORAC SPINE 3VWS: CPT

## 2022-07-26 RX ORDER — SODIUM CHLORIDE 1000 MG
2 TABLET, SOLUBLE MISCELLANEOUS
Status: DISCONTINUED | OUTPATIENT
Start: 2022-07-26 | End: 2022-07-29

## 2022-07-26 RX ORDER — GUAIFENESIN 600 MG/1
600 TABLET, EXTENDED RELEASE ORAL EVERY 12 HOURS SCHEDULED
Status: DISCONTINUED | OUTPATIENT
Start: 2022-07-26 | End: 2022-07-30 | Stop reason: HOSPADM

## 2022-07-26 RX ORDER — SODIUM CHLORIDE 1000 MG
1 TABLET, SOLUBLE MISCELLANEOUS ONCE
Status: COMPLETED | OUTPATIENT
Start: 2022-07-26 | End: 2022-07-26

## 2022-07-26 RX ADMIN — LIDOCAINE 5% 2 PATCH: 700 PATCH TOPICAL at 08:54

## 2022-07-26 RX ADMIN — DILTIAZEM HYDROCHLORIDE 120 MG: 60 TABLET, FILM COATED ORAL at 12:27

## 2022-07-26 RX ADMIN — MEMANTINE 10 MG: 10 TABLET ORAL at 17:33

## 2022-07-26 RX ADMIN — GUAIFENESIN 600 MG: 600 TABLET ORAL at 09:45

## 2022-07-26 RX ADMIN — FUROSEMIDE 20 MG: 20 TABLET ORAL at 17:33

## 2022-07-26 RX ADMIN — HEPARIN SODIUM 5000 UNITS: 5000 INJECTION INTRAVENOUS; SUBCUTANEOUS at 05:12

## 2022-07-26 RX ADMIN — DICLOFENAC SODIUM TOPICAL GEL, 1%, 2 G: 10 GEL TOPICAL at 12:28

## 2022-07-26 RX ADMIN — ATORVASTATIN CALCIUM 20 MG: 20 TABLET, FILM COATED ORAL at 08:54

## 2022-07-26 RX ADMIN — OXYCODONE HYDROCHLORIDE 5 MG: 5 TABLET ORAL at 00:50

## 2022-07-26 RX ADMIN — SODIUM CHLORIDE 1 G: 1 TABLET ORAL at 14:10

## 2022-07-26 RX ADMIN — DICLOFENAC SODIUM TOPICAL GEL, 1%, 2 G: 10 GEL TOPICAL at 17:38

## 2022-07-26 RX ADMIN — DICLOFENAC SODIUM TOPICAL GEL, 1%, 2 G: 10 GEL TOPICAL at 21:22

## 2022-07-26 RX ADMIN — SODIUM CHLORIDE 1 G: 1 TABLET ORAL at 12:27

## 2022-07-26 RX ADMIN — DILTIAZEM HYDROCHLORIDE 120 MG: 60 TABLET, FILM COATED ORAL at 05:12

## 2022-07-26 RX ADMIN — HEPARIN SODIUM 5000 UNITS: 5000 INJECTION INTRAVENOUS; SUBCUTANEOUS at 14:10

## 2022-07-26 RX ADMIN — MEMANTINE 10 MG: 10 TABLET ORAL at 08:54

## 2022-07-26 RX ADMIN — AMLODIPINE BESYLATE 2.5 MG: 2.5 TABLET ORAL at 08:53

## 2022-07-26 RX ADMIN — LEVOTHYROXINE SODIUM 50 MCG: 50 TABLET ORAL at 05:12

## 2022-07-26 RX ADMIN — FUROSEMIDE 20 MG: 20 TABLET ORAL at 08:53

## 2022-07-26 RX ADMIN — SODIUM CHLORIDE 2 G: 1 TABLET ORAL at 17:33

## 2022-07-26 RX ADMIN — METHOCARBAMOL 250 MG: 500 TABLET ORAL at 12:27

## 2022-07-26 RX ADMIN — ACETAMINOPHEN 975 MG: 325 TABLET ORAL at 14:10

## 2022-07-26 RX ADMIN — DOCUSATE SODIUM 100 MG: 100 CAPSULE, LIQUID FILLED ORAL at 17:33

## 2022-07-26 RX ADMIN — HEPARIN SODIUM 5000 UNITS: 5000 INJECTION INTRAVENOUS; SUBCUTANEOUS at 21:22

## 2022-07-26 RX ADMIN — DICLOFENAC SODIUM TOPICAL GEL, 1%, 2 G: 10 GEL TOPICAL at 08:58

## 2022-07-26 RX ADMIN — ACETAMINOPHEN 975 MG: 325 TABLET ORAL at 21:21

## 2022-07-26 RX ADMIN — SENNOSIDES 17.2 MG: 8.6 TABLET, FILM COATED ORAL at 08:54

## 2022-07-26 RX ADMIN — ACETAMINOPHEN 975 MG: 325 TABLET ORAL at 05:12

## 2022-07-26 RX ADMIN — SODIUM CHLORIDE 1 G: 1 TABLET ORAL at 09:07

## 2022-07-26 RX ADMIN — DILTIAZEM HYDROCHLORIDE 120 MG: 60 TABLET, FILM COATED ORAL at 17:33

## 2022-07-26 RX ADMIN — GUAIFENESIN 600 MG: 600 TABLET ORAL at 21:21

## 2022-07-26 RX ADMIN — DOCUSATE SODIUM 100 MG: 100 CAPSULE, LIQUID FILLED ORAL at 08:54

## 2022-07-26 RX ADMIN — METHOCARBAMOL 250 MG: 500 TABLET ORAL at 17:33

## 2022-07-26 RX ADMIN — PANTOPRAZOLE SODIUM 40 MG: 40 TABLET, DELAYED RELEASE ORAL at 08:54

## 2022-07-26 NOTE — UTILIZATION REVIEW
Inpatient Admission Authorization Request   NOTIFICATION OF INPATIENT ADMISSION/INPATIENT AUTHORIZATION REQUEST   SERVICING FACILITY:   [address]  Tax ID: [id number]  NPI: [id number]  Place of Service: Inpatient [facility]  [facility]  Place of Service Code: 24     ATTENDING PROVIDER:  Attending Name and NPI#: Greg Christensen [[id number]]  Address: [address]  Phone: [phone]     UTILIZATION REVIEW CONTACT:  Betina Ryan Utilization   Network Utilization Review Department  Phone: [phone]  Fax: [phone]  Email: [email or web address]     PHYSICIAN ADVISORY SERVICES:  FOR [id number] REVIEW - MEDICAL NECESSITY DENIAL  Phone: [phone]  Fax: [phone]  Email: [email or web address]     TYPE OF REQUEST:  Inpatient Status     ADMISSION INFORMATION:  ADMISSION DATE/TIME: 7/23/22  2:09 PM  PATIENT DIAGNOSIS CODE/DESCRIPTION:  Shoulder pain [M25 519]  Closed fracture of multiple ribs of left side, initial encounter [S22 42XA]  Closed fracture of third thoracic vertebra, unspecified fracture morphology, [facility] [S22 039A]  DISCHARGE DATE/TIME: No discharge date for patient encounter  IMPORTANT INFORMATION:  Please contact Betina Ryan directly with any questions or concerns regarding this request  Department voicemails are confidential     Send requests for admission clinical reviews, concurrent reviews, approvals, and administrative denials due to lack of clinical to fax [phone]             Initial Clinical Review    Admission: Date/Time/Statement:   Admission Orders (From admission, onward)     Ordered        07/23/22 1408  Inpatient Admission  Once                      Orders Placed This Encounter   Procedures    Inpatient Admission     Standing Status:   Standing     Number of Occurrences:   1     Order Specific Question:   Level of Care     Answer: Med Surg [16]     Order Specific Question:   Bed Type     Answer:   Trauma [7]     Order Specific Question:   Estimated length of stay     Answer:   More than 2 Midnights     Order Specific Question:   Certification     Answer:   I certify that inpatient services are medically necessary for this patient for a duration of greater than two midnights  See H&P and MD Progress Notes for additional information about the patient's course of treatment  ED Arrival Information     Expected   -    Arrival   7/23/2022 10:42    Acuity   Urgent            Means of arrival   Ambulance    Escorted by   [facility]    Service   Trauma    Admission type   Urgent            Arrival complaint   Shoulder Pain           Chief Complaint   Patient presents with    Fall     Pt fall/slid out of recliner landing on rear  C/o L shoulder pain  Denies head strike  Initial Presentation: 80 y o  female  From [address] living to ED via ems admitted inpatient due to left  5th and 7th Rib fractures/progressio of T3 fracture/hyponatemia  Presented due to pain in left shoulder, left rib cage and left mid back starting after slipped out of chair, on ground about 2 hours until found on day of arrival    Has had pain in left shoulder and back since fall 9 days prior  On exam: tachycardic  Tenderness to left anterior chest wall beneath left breast   Midline tenderness from  cervical spine to lower thoracic spine  Pain with ROM of shoulder  Na 127  Wbc 11 71  Xray of left shoulder shoulder old trauma deformity  Ct chest showed fracture of left 5 and 7 ribs, compression fracture T3 progressed  In the ED given IV fentanyl  Plan is multimodal pain regimen  Consult pain service and neuro surgery  PT/OT  Trend BMP    Date: 7/24/22    Day 2: Has continued left upper chest wall pain  on exam on oxygen 2 liters   - 500 ml  Adequate cough  Mild upper thoracic spine tenderness    Mild left chest wall tenderness  Pain in left shoulder with ROM  Plan is PT/OT  Pain control  TLSO brace  7/24/22 per neuro surgery - patient with T 3 compression fracture with minimal progression and T4 and T9 fractures stable  Has chronic back pain and previous lumbar surgery  Plan is monitor neuro status  Brace if able  Upright Xray when able    7/24/22 per Acute pain service - Patient post fall with left 5, 7 rib fracture and multiple thoracic compression fractures  No need epidural    Has not tried oxycodone  Plan is aggressive pain control with oxycodone, scheduled tylenol, IV Dilaudid as needed, Lidocaine patches and scheduled Robaxin  Bowel regimen  ED Triage Vitals   Temperature Pulse Respirations Blood Pressure SpO2   07/23/22 1046 07/23/22 1046 07/23/22 1046 07/23/22 1046 07/23/22 1046   97 9 °F (36 6 °C) 103 18 155/82 100 %      Temp Source Heart Rate Source Patient Position - Orthostatic VS BP Location FiO2 (%)   07/23/22 1046 07/23/22 1046 07/23/22 1046 07/23/22 1046 --   Oral Monitor Lying Right arm       Pain Score       07/23/22 1356       4          Wt Readings from Last 1 Encounters:   07/14/22 85 1 kg (187 lb 9 8 oz)     Additional Vital Signs:   07/24/22 10:54:42 98 1 °F (36 7 °C) 76 18 109/58 75 95 % 28 2 L/min Nasal cannula Lying   07/24/22 07:38:45 98 1 °F (36 7 °C) 70 18 116/57 77 94 % 28 2 L/min Nasal cannula Lying   07/24/22 05:30:27 -- 72 -- 137/66 90 97 % -- -- -- --   07/24/22 03:02:15 98 °F (36 7 °C) 64 18 109/56 74 94 % 28 2 L/min Nasal cannula --   07/24/22 00:11:48 -- 73 -- 125/62 83 96 % -- -- -- --   07/23/22 22:09:34 97 6 °F (36 4 °C) 81 -- 126/68 87 97 % -- -- -- --   07/23/22 18:46:58 98 3 °F (36 8 °C) 88 -- 101/57 72 94 % -- -- -- --   07/23/22 1609 -- 85 20 -- -- 98 % 28 2 L/min Nasal cannula        Pertinent Labs/Diagnostic Test Results:   XR shoulder 2+ views LEFT   Final Result by Kelsie Mcneill MD (07/23 4268)      No acute osseous abnormality    Deformity of the left humeral head consistent with old trauma  Workstation performed: [id number]         CT head without contrast   Final Result by Augustine Le DO (07/23 1208)   1  Somewhat limited examination due to patient motion artifact  2   Stable cerebral atrophy with 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  No acute intracranial abnormality  If there is continued concern for acute intracranial injury, consider follow-up neurology consultation and/or MRI of the brain with T2 gradient echo weighted sequencing and FLAIR weighted sequencing  Workstation performed: [id number]         CT spine cervical without contrast   Final Result by Augustine Le DO (07/23 1213)   Stable degenerative changes of the cervical spine  No acute cervical spine fracture or traumatic malalignment  Workstation performed: [id number]         CT chest without contrast   Final Result by Augustine Le DO (07/23 1235)   1  Acute fractures of the left 5th and 7th ribs  No evidence of pneumothorax  2   Mild compression fracture T3, progressed from the prior study  3   Additional, stable incidental findings as described above  If warranted, consider follow-up trauma surgery and/or neuro surgery consultation  The study was marked in [facility] for immediate notification  Workstation performed: [id number]         CT recon only thoracic spine   Final Result by Augustine Le DO (07/23 1238)   1  Progression of mild compression fracture T3 vertebral body  2   Stable compression fractures of T4 and T9 vertebral bodies                 Workstation performed: [id number]             Results from last 7 days   Lab Units 07/24/22  0515 07/23/22  1205   WBC Thousand/uL 8 96 11 71*   HEMOGLOBIN g/dL 10 7* 12 9   HEMATOCRIT % 32 5* 38 3   PLATELETS Thousands/uL 292 286   NEUTROS ABS Thousands/µL  --  9 37*     Results from last 7 days   Lab Units 07/26/22  0526 07/25/22  1423 07/25/22  0507 07/24/22  1755 07/24/22  0515   SODIUM mmol/L 125* 125* 127* 124* 127*   POTASSIUM mmol/L 4 2 4 0 4 2 4 2 3 9   CHLORIDE mmol/L 91* 92* 92* 88* 91*   CO2 mmol/L 31 29 31 29 32   ANION GAP mmol/L 3* 4 4 7 4   BUN mg/dL 14 13 12 13 11   CREATININE mg/dL 0 70 0 78 0 64 0 75 0 68   EGFR ml/min/1 73sq m 77 67 79 70 77   CALCIUM mg/dL 8 7 8 4 8 6 8 9 8 7     Results from last 7 days   Lab Units 07/23/22  1205   AST U/L 21   ALT U/L 15   ALK PHOS U/L 137*   TOTAL PROTEIN g/dL 7 2   ALBUMIN g/dL 3 9   TOTAL BILIRUBIN mg/dL 0 52     Results from last 7 days   Lab Units 07/26/22  0526 07/25/22  1423 07/25/22  0507 07/24/22  1755 07/24/22  0515 07/23/22  1205   GLUCOSE RANDOM mg/dL 88 144* 94 124 94 108     Results from last 7 days   Lab Units 07/23/22  1205   CK TOTAL U/L 47     Results from last 7 days   Lab Units 07/24/22  0515   PROTIME seconds 13 1   INR  0 99   PTT seconds 36       ED Treatment:   Medication Administration from 07/23/2022 1042 to 07/23/2022 1502       Date/Time Order Dose Route Action Comments     07/23/2022 1207 Diclofenac Sodium (VOLTAREN) 1 % topical gel 2 g 2 g Topical Given      07/23/2022 1206 lidocaine (LIDODERM) 5 % patch 1 patch 1 patch Topical Medication Applied middle, upper     07/23/2022 1206 acetaminophen (TYLENOL) tablet 325 mg 325 mg Oral Given      07/23/2022 1356 fentanyl citrate (PF) 100 MCG/2ML 20 mcg 20 mcg Intravenous Given         Past Medical History:   Diagnosis Date    CHF (congestive heart failure) (HCC)     Dementia without behavioral disturbance (HCC)     High cholesterol     Hypertension     Hypothyroid     Stage 3 chronic kidney disease, unspecified whether stage 3a or 3b CKD ([facility] )     Stroke ([facility] ) 09/21/2021     Present on Admission:   Fall   Closed fracture of multiple ribs of left side   Closed wedge compression fracture of T3 vertebra (HCC)   HTN (hypertension)   CKD (chronic kidney disease) stage 3, GFR 30-59 ml/min ([facility])   [facility]   Hypothyroid   Hyponatremia      Admitting Diagnosis: Shoulder pain [M25 519]  Closed fracture of multiple ribs of left side, initial encounter [S22 42XA]  Closed fracture of third thoracic vertebra, unspecified fracture morphology, initial encounter ([facility] ) [S22 039A]  Age/Sex: 80 y o  female  Admission Orders:  7/23/22 1408 inpatient   Scheduled Medications:  acetaminophen, 975 mg, Oral, Q8H [address]  amLODIPine, 2 5 mg, Oral, Daily  atorvastatin, 20 mg, Oral, Daily  cyanocobalamin, 1,000 mcg, Intramuscular, Q30 Days  Diclofenac Sodium, 2 g, Topical, 4x Daily  diltiazem, 120 mg, Oral, Q6H EVA  docusate sodium, 100 mg, Oral, BID  furosemide, 20 mg, Oral, BID  levothyroxine, 50 mcg, Oral, Daily  lidocaine, 2 patch, Topical, Daily  memantine, 10 mg, Oral, BID  methocarbamol, 250 mg, Oral, Q6H EVA  pantoprazole, 40 mg, Oral, Daily  senna, 2 tablet, Oral, Daily      Continuous IV Infusions: none      PRN Meds:  HYDROmorphone, 0 2 mg, Intravenous, Q4H PRN  naloxone, 0 04 mg, Intravenous, Q1MIN PRN  ondansetron, 4 mg, Intravenous, Q4H PRN  oxyCODONE, 2 5 mg, Oral, Q4H PRN  oxyCODONE, 5 mg, Oral, Q4H PRN - used x 2 7/24/22   polyethylene glycol, 17 g, Oral, Daily PRN - used x 1 7/23/22     Neuro check every 4 hours  Incentive spirometry  Continuous pulse oximetry  Spine brace - TLSO  Thoracic spine precautions  IP CONSULT TO ACUTE PAIN SERVICE  IP CONSULT TO NEUROSURGERY  IP CONSULT TO GERONTOLOGY    Network Utilization Review Department  ATTENTION: Please call with any questions or concerns to [phone] and carefully listen to the prompts so that you are directed to the right person  All voicemails are confidential   Farrel Bodily all requests for admission clinical reviews, approved or denied determinations and any other requests to dedicated fax number below belonging to the campus where the patient is receiving treatment   List of dedicated fax numbers for the Facilities:  FACILITY NAME UR FAX NUMBER   ADMISSION DENIALS (Administrative/Medical Necessity) [phone]   [address] (Maternity/NICU/Pediatrics) [address] [facility],7Th Floor [facility] [address] [place] [address]  [phone]   [address] [place] [address] [id number] [place] [address] [address] Mejia [address] [facility] [place] [address] [phone]

## 2022-07-26 NOTE — PROGRESS NOTES
[facility]  Progress Note - Jayda Burgos 1933, 80 y o  female MRN: [id number]  Unit/Bed#: S -01 Encounter: [id number]  Primary Care Provider: Mc Sweet MD   Date and time admitted to hospital: 7/23/2022 10:43 AM    Hyponatremia  Assessment & Plan  - Significant hyponatremia, present on presentation, with sodium of 127   - Sodium down trended as low as 124 on 07/24/2022   125, today  - Unclear etiology  Based on workup, patient does appear euvolemic with hyponatremia  Serum OSM pending    - Salt tabs 1 g 3 times daily was initiated on 07/24/2022     - nephrology consulted 7/26  - Continue to monitor sodium with serial BMP   - Outpatient follow-up with PCP  Hypothyroid  Assessment & Plan  - Patient with chronic history of hypothyroidism   - Continue home medication regimen   - Outpatient follow-up per routine  Chronic congestive heart failure (HCC)  Assessment & Plan  Wt Readings from Last 3 Encounters:   07/14/22 85 1 kg (187 lb 9 8 oz)     - Patient with chronic history of CHF without evidence of acute exacerbation   - Continue home medication regimen  - Monitor volume status   - Outpatient follow-up per routine  CKD (chronic kidney disease) stage 3, GFR 30-59 ml/min [facility])  Assessment & Plan  Lab Results   Component Value Date    EGFR 77 07/26/2022    EGFR 67 07/25/2022    EGFR 79 07/25/2022    CREATININE 0 70 07/26/2022    CREATININE 0 78 07/25/2022    CREATININE 0 64 07/25/2022        - Patient with history of CKD stage 3 and baseline creatinine around 0 9  Patient follows with [facility] as outpatient  - Patient also known to 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   - No evidence of acute kidney injury at this time  - Monitor volume status and renal function with BMP  - Avoid nephrotoxic medications and hypotension   - Outpatient follow-up per routine      HTN (hypertension)  Assessment & Plan  - Chronic history of hypertension   - Continue home medication regimen as indicated  - Outpatient follow-up per routine  Closed wedge compression fracture of T3 vertebra (HCC)  Assessment & Plan  - Mild T3 compression fracture, present on admission, with progression when compared to prior imaging   - Appreciate Neurosurgery evaluation and recommendations  Non-operative management recommended  - Bracing: Initially a TLSO brace was ordered  After discussion with Neurosurgery, bracing for this fracture will be difficult due to level of fracture  Would likely need cervical collar and TLSO brace or a  brace, but neither may cover the fracture level well per Neurosurgery  No bracing at this time  - Monitor neurovascular exam   - Multimodal analgesic regimen as needed  - PT and OT evaluation and treatment as indicated  - Outpatient follow up with Neurosurgery for re-evaluation  Fall  Assessment & Plan  - Status post fall with the below noted injuries  - Fall precautions  - Geriatric Medicine consultation for evaluation, medication review and recommendations   - PT and OT evaluation and treatment as indicated  - Case Management consultation for disposition planning  * Closed fracture of multiple ribs of left side  Assessment & Plan  - Multiple left-sided rib fractures (5 & 7), present on admission   - Continue rib fracture protocol   - Continue to encourage incentive spirometer use and adequate pulmonary hygiene  Currently pulling 500-750 mL on I S  Flutter valve also given to patient  - PIC score is 8   - Continue multimodal analgesic regimen  Appreciate APS evaluation and recommendations   - Supplemental oxygen via nasal cannula as needed to maintain saturations greater than or equal to 94%  - Repeat chest x-ray on 7/24/2022 stable  - PT and OT evaluation and treatment as indicated  - Outpatient follow-up in the trauma clinic for re-evaluation in approximately 2 weeks            Disposition:  Continue current level of care, continue to monitor sodium level and respiratory status  Patient has received placement at [facility] discharge within the next 24-48 hours  SUBJECTIVE:  Chief Complaint:  I feel better than yesterday    Subjective:  Patient describes feeling better today in comparison yesterday  She states that since receiving the block from APS, her pain has largely been under control  While she still does note left rib pain, she denies any shortness of breath or difficulty breathing  She confirms that she has been tolerating her diet  She confirms that she was able to get sleep overnight  She denies any new complaints  OBJECTIVE:   Vitals:   Temp:  [97 8 °F (36 6 °C)-98 4 °F (36 9 °C)] 98 4 °F (36 9 °C)  HR:  [63-81] 76  Resp:  [16-18] 18  BP: ()/(45-84) 113/55    Intake/Output:  I/O       07/24 0701  07/25 0700 07/25 0701  07/26 0700 07/26 0701  07/27 0700    P  O  400 960     IV Piggyback 500      Total Intake 900 960     Urine 950 470     Total Output 950 470     Net -50 +490            Unmeasured Urine Occurrence  1 x          Nutrition: Diet Regular; Regular House; Fluid Restriction 1500 ML  GI Proph/Bowel Reg:  Colace, senna  VTE Prophylaxis:Sequential compression device (Venodyne)  and Heparin     Physical Exam:   GENERAL APPEARANCE:  No acute distress  NEURO:  GCS is 15  Light touch sensation intact throughout  No lateralizing weakness  HEENT:  Normocephalic, atraumatic  Neck supple  CV: RRR, +2 radial dorsalis pedis pulses, bilaterally  LUNGS:  Scant rhonchi and rales audible throughout  No orthopnea  No tachypnea  Left-sided lateral chest wall pain on palpation  No palpable crepitus  GI:  Abdomen is soft nontender  :  Pelvis stable  MSK:  No extremity tenderness  No deformities  SKIN:  Warm, dry      Invasive Devices  Report    Peripheral Intravenous Line  Duration           Peripheral IV 07/23/22 Left Antecubital 2 days          Drain  Duration External Urinary Catheter 2 days                 PIC Score  PIC Pain Score: 3 (7/26/2022  8:30 AM)  PIC Incentive Spirometry Score: Above goal volume (7/26/2022  5:12 AM)  PIC Cough Description: Absent (7/26/2022  5:12 AM)  PIC Total Score: 8 (7/26/2022  5:12 AM)       If the Total PIC Score </=5, did you consult APS and evaluate patient for further intervention?: yes      Pain:    Incentive Spirometry  Cough  3 = Controlled  4 = Above goal volume 3 = Strong  2 = Moderate  3 = Goal to alert volume 2 = Weak  1 = Severe  2 = Below alert volume 1 = Absent     1 = Unable to perform IS         Lab Results:   Results: I have personally reviewed all pertinent laboratory/tests results and BMP/CMP:   Lab Results   Component Value Date    SODIUM 125 (L) 07/26/2022    K 4 2 07/26/2022    CL 91 (L) 07/26/2022    CO2 31 07/26/2022    BUN 14 07/26/2022    CREATININE 0 70 07/26/2022    CALCIUM 8 7 07/26/2022    EGFR 77 07/26/2022     Imaging/EKG Studies: I have personally reviewed pertinent reports       Other Studies: none

## 2022-07-26 NOTE — ASSESSMENT & PLAN NOTE
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

## 2022-07-26 NOTE — PLAN OF CARE
Problem: MOBILITY - ADULT  Goal: Maintain or return to baseline ADL function  Description: INTERVENTIONS:  -  Assess patient's ability to carry out ADLs; assess patient's baseline for ADL function and identify physical deficits which impact ability to perform ADLs (bathing, care of mouth/teeth, toileting, grooming, dressing, etc )  - Assess/evaluate cause of self-care deficits   - Assess range of motion  - Assess patient's mobility; develop plan if impaired  - Assess patient's need for assistive devices and provide as appropriate  - Encourage maximum independence but intervene and supervise when necessary  - Involve family in performance of ADLs  - Assess for home care needs following discharge   - Consider OT consult to assist with ADL evaluation and planning for discharge  - Provide patient education as appropriate  Outcome: Progressing  Goal: Maintains/Returns to pre admission functional level  Description: INTERVENTIONS:  - Perform BMAT or MOVE assessment daily    - Set and communicate daily mobility goal to care team and patient/family/caregiver  - Collaborate with rehabilitation services on mobility goals if consulted  - Perform Range of Motion 3 times a day  - Reposition patient every 2 hours    - Dangle patient 3 times a day  - Stand patient 3 times a day  - Ambulate patient 3 times a day  - Out of bed to chair 3 times a day   - Out of bed for meals 3 times a day  - Out of bed for toileting  - Record patient progress and toleration of activity level   Outcome: Progressing     Problem: PAIN - ADULT  Goal: Verbalizes/displays adequate comfort level or baseline comfort level  Description: Interventions:  - Encourage patient to monitor pain and request assistance  - Assess pain using appropriate pain scale  - Administer analgesics based on type and severity of pain and evaluate response  - Implement non-pharmacological measures as appropriate and evaluate response  - Consider cultural and social influences on pain and pain management  - Notify physician/advanced practitioner if interventions unsuccessful or patient reports new pain  Outcome: Progressing     Problem: INFECTION - ADULT  Goal: Absence or prevention of progression during hospitalization  Description: INTERVENTIONS:  - Assess and monitor for signs and symptoms of infection  - Monitor lab/diagnostic results  - Monitor all insertion sites, i e  indwelling lines, tubes, and drains  - Monitor endotracheal if appropriate and nasal secretions for changes in amount and color  - [place] appropriate cooling/warming therapies per order  - Administer medications as ordered  - Instruct and encourage patient and family to use good hand hygiene technique  - Identify and instruct in appropriate isolation precautions for identified infection/condition  Outcome: Progressing  Goal: Absence of fever/infection during neutropenic period  Description: INTERVENTIONS:  - Monitor WBC    Outcome: Progressing     Problem: SAFETY ADULT  Goal: Maintain or return to baseline ADL function  Description: INTERVENTIONS:  -  Assess patient's ability to carry out ADLs; assess patient's baseline for ADL function and identify physical deficits which impact ability to perform ADLs (bathing, care of mouth/teeth, toileting, grooming, dressing, etc )  - Assess/evaluate cause of self-care deficits   - Assess range of motion  - Assess patient's mobility; develop plan if impaired  - Assess patient's need for assistive devices and provide as appropriate  - Encourage maximum independence but intervene and supervise when necessary  - Involve family in performance of ADLs  - Assess for home care needs following discharge   - Consider OT consult to assist with ADL evaluation and planning for discharge  - Provide patient education as appropriate  Outcome: Progressing  Goal: Maintains/Returns to pre admission functional level  Description: INTERVENTIONS:  - Perform BMAT or MOVE assessment daily    - Set and communicate daily mobility goal to care team and patient/family/caregiver  - Collaborate with rehabilitation services on mobility goals if consulted  - Perform Range of Motion 3 times a day  - Reposition patient every 2 hours    - Dangle patient 3 times a day  - Stand patient 3 times a day  - Ambulate patient 3 times a day  - Out of bed to chair 3 times a day   - Out of bed for meals 3 times a day  - Out of bed for toileting  - Record patient progress and toleration of activity level   Outcome: Progressing  Goal: Patient will remain free of falls  Description: INTERVENTIONS:  - Educate patient/family on patient safety including physical limitations  - Instruct patient to call for assistance with activity   - Consult OT/PT to assist with strengthening/mobility   - Keep Call bell within reach  - Keep bed low and locked with side rails adjusted as appropriate  - Keep care items and personal belongings within reach  - Initiate and maintain comfort rounds  - Make Fall Risk Sign visible to staff  - Offer Toileting every 2 Hours, in advance of need  - Initiate/Maintain alarm  - Obtain necessary fall risk management equipment:   - Apply yellow socks and bracelet for high fall risk patients  - Consider moving patient to room near nurses station  Outcome: Progressing     Problem: DISCHARGE PLANNING  Goal: Discharge to home or other facility with appropriate resources  Description: INTERVENTIONS:  - Identify barriers to discharge w/patient and caregiver  - Arrange for needed discharge resources and transportation as appropriate  - Identify discharge learning needs (meds, wound care, etc )  - Arrange for interpretive services to assist at discharge as needed  - Refer to Case Management Department for coordinating discharge planning if the patient needs post-hospital services based on physician/advanced practitioner order or complex needs related to functional status, cognitive ability, or social support system  Outcome: Progressing     Problem: Potential for Falls  Goal: Patient will remain free of falls  Description: INTERVENTIONS:  - Educate patient/family on patient safety including physical limitations  - Instruct patient to call for assistance with activity   - Consult OT/PT to assist with strengthening/mobility   - Keep Call bell within reach  - Keep bed low and locked with side rails adjusted as appropriate  - Keep care items and personal belongings within reach  - Initiate and maintain comfort rounds  - Make Fall Risk Sign visible to staff  - Offer Toileting every 2 Hours, in advance of need  - Initiate/Maintain alarm  - Obtain necessary fall risk management equipment:   - Apply yellow socks and bracelet for high fall risk patients  - Consider moving patient to room near nurses station  Outcome: Progressing     Problem: Prexisting or High Potential for Compromised Skin Integrity  Goal: Skin integrity is maintained or improved  Description: INTERVENTIONS:  - Identify patients at risk for skin breakdown  - Assess and monitor skin integrity  - Assess and monitor nutrition and hydration status  - Monitor labs   - Assess for incontinence   - Turn and reposition patient  - Assist with mobility/ambulation  - Relieve pressure over bony prominences  - Avoid friction and shearing  - Provide appropriate hygiene as needed including keeping skin clean and dry  - Evaluate need for skin moisturizer/barrier cream  - Collaborate with interdisciplinary team   - Patient/family teaching  - Consider wound care consult   Outcome: Progressing     Problem: Nutrition/Hydration-ADULT  Goal: Nutrient/Hydration intake appropriate for improving, restoring or maintaining nutritional needs  Description: Monitor and assess patient's nutrition/hydration status for malnutrition  Collaborate with interdisciplinary team and initiate plan and interventions as ordered  Monitor patient's weight and dietary intake as ordered or per policy   Utilize nutrition screening tool and intervene as necessary  Determine patient's food preferences and provide high-protein, high-caloric foods as appropriate       INTERVENTIONS:  - Monitor oral intake, urinary output, labs, and treatment plans  - Assess nutrition and hydration status and recommend course of action  - Evaluate amount of meals eaten  - Assist patient with eating if necessary   - Allow adequate time for meals  - Recommend/ encourage appropriate diets, oral nutritional supplements, and vitamin/mineral supplements  - Order, calculate, and assess calorie counts as needed  - Recommend, monitor, and adjust tube feedings and TPN/PPN based on assessed needs  - Assess need for intravenous fluids  - Provide specific nutrition/hydration education as appropriate  - Include patient/family/caregiver in decisions related to nutrition  Outcome: Progressing

## 2022-07-26 NOTE — TREATMENT PLAN
Upright plain thoracic films reviewed  Stable appearance of minimal T3 compression fracture and chronic T4, T9 fractures  Alignment is maintained  Continue to recommend [place] brace and backpack brace for comfort when upright  Neurosurgery will sign off  Follow up outpatient in 2 weeks with repeat x-rays  Please call with any questions or concerns

## 2022-07-26 NOTE — ASSESSMENT & PLAN NOTE
- Mild T3 compression fracture, present on admission, with progression when compared to prior imaging   - Appreciate Neurosurgery evaluation and recommendations  Non-operative management recommended  - Bracing: Initially a TLSO brace was ordered  After discussion with Neurosurgery, bracing for this fracture will be difficult due to level of fracture  Would likely need cervical collar and TLSO brace or a  brace, but neither may cover the fracture level well per Neurosurgery  No bracing at this time  - Monitor neurovascular exam   - Multimodal analgesic regimen as needed  - PT and OT evaluation and treatment as indicated  - Outpatient follow up with Neurosurgery for re-evaluation

## 2022-07-26 NOTE — ASSESSMENT & PLAN NOTE
- Multiple left-sided rib fractures (5 & 7), present on admission   - Continue rib fracture protocol   - Continue to encourage incentive spirometer use and adequate pulmonary hygiene  Currently pulling 500-750 mL on I S  Flutter valve also given to patient  - PIC score is 8   - Continue multimodal analgesic regimen  Appreciate APS evaluation and recommendations   - Supplemental oxygen via nasal cannula as needed to maintain saturations greater than or equal to 94%  - Repeat chest x-ray on 7/24/2022 stable  - PT and OT evaluation and treatment as indicated  - Outpatient follow-up in the trauma clinic for re-evaluation in approximately 2 weeks

## 2022-07-26 NOTE — PLAN OF CARE
Problem: MOBILITY - ADULT  Goal: Maintain or return to baseline ADL function  Description: INTERVENTIONS:  -  Assess patient's ability to carry out ADLs; assess patient's baseline for ADL function and identify physical deficits which impact ability to perform ADLs (bathing, care of mouth/teeth, toileting, grooming, dressing, etc )  - Assess/evaluate cause of self-care deficits   - Assess range of motion  - Assess patient's mobility; develop plan if impaired  - Assess patient's need for assistive devices and provide as appropriate  - Encourage maximum independence but intervene and supervise when necessary  - Involve family in performance of ADLs  - Assess for home care needs following discharge   - Consider OT consult to assist with ADL evaluation and planning for discharge  - Provide patient education as appropriate  Outcome: Progressing     Problem: SAFETY ADULT  Goal: Maintain or return to baseline ADL function  Description: INTERVENTIONS:  -  Assess patient's ability to carry out ADLs; assess patient's baseline for ADL function and identify physical deficits which impact ability to perform ADLs (bathing, care of mouth/teeth, toileting, grooming, dressing, etc )  - Assess/evaluate cause of self-care deficits   - Assess range of motion  - Assess patient's mobility; develop plan if impaired  - Assess patient's need for assistive devices and provide as appropriate  - Encourage maximum independence but intervene and supervise when necessary  - Involve family in performance of ADLs  - Assess for home care needs following discharge   - Consider OT consult to assist with ADL evaluation and planning for discharge  - Provide patient education as appropriate  Outcome: Progressing     Problem: Potential for Falls  Goal: Patient will remain free of falls  Description: INTERVENTIONS:  - Educate patient/family on patient safety including physical limitations  - Instruct patient to call for assistance with activity   - Consult OT/PT to assist with strengthening/mobility   - Keep Call bell within reach  - Keep bed low and locked with side rails adjusted as appropriate  - Keep care items and personal belongings within reach  - Initiate and maintain comfort rounds  - Make Fall Risk Sign visible to staff  - Offer Toileting every 3 Hours, in advance of need  - Initiate/Maintain chair alarm  - Obtain necessary fall risk management equipment: rolling walker  - Apply yellow socks and bracelet for high fall risk patients  - Consider moving patient to room near nurses station  Outcome: Progressing

## 2022-07-26 NOTE — PROGRESS NOTES
Peripheral Nerve Block Follow-up Note - Acute Pain Service    Cliff Flores 80 y o  female MRN: [id number]  Unit/Bed#: S -01 Encounter: [id number]      Assessment:   Principal Problem:    Closed fracture of multiple ribs of left side  Active Problems:    Fall    Closed wedge compression fracture of T3 vertebra (HCC)    HTN (hypertension)    CKD (chronic kidney disease) stage 3, GFR 30-59 ml/min (HCC)    Chronic congestive heart failure (HCC)    Hypothyroid    Hyponatremia    Cliff Flores is a 80y o  year old female with PMHx of HTN, CKD III, HFpEF who presents with left 5th-7th rib fractures and multiple thoracic compression fractures after a mechanical fall  Patient underwent left DIESP nerve block with Exparel yesterday primarily for the treatment of her rib fracture pain  She experienced significant relief  She had unrelated improvement in her others injuries as well  Patient remains largely immobile with minimal movements that would exacerbate her pain  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, 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 but quickly returns to her previous baseline  Plan:   - Left DIESP with ongoing sensory deficits, no indication for repeat interventional pain procedure at this time  - Oxycodone 2 5mg/5mg PO q4hrs PRN for moderate/severe pain  - Recommend discontinuing IV opioids in preparation for dispo    Multimodal analgesia with:  - Tylenol 975 mg PO q8hrs standing   - Robaxin 250 mg PO q6hrs standing  - Lidocaine 5% patch, 2 patches to left chest and shoulder 12 hours on, 12 hours off  - Voltaren gel QID to left shoulder    Bowel regimen:  - Senokot 2 tabs PO daily  - PEG 1 packet PO daily PRN     APS will sign off at this time  Thank you for the consult  All opioids and other analgesics to be written at discretion of primary team  Please contact Acute Pain Service - [facility] via [employer] from [phone] with additional questions or concerns   See Bobby or Ace for additional contacts and after hours information  Pain History  Current pain location(s): Intermittent left shoulder pain  Pain Scale: 0-8/10 (currently and most frequently 0/10)  Quality: Sore, intermittent stabbing  24 hour history: Underwent single shot DIESP blocks yesterday morning to good effect    Opioid requirement previous 24 hours: oxycodone 5 mg    Meds/Allergies   all current active meds have been reviewed    Allergies   Allergen Reactions    Penicillins Hives     Objective     Temp:  [97 2 °F (36 2 °C)-98 2 °F (36 8 °C)] 98 2 °F (36 8 °C)  HR:  [63-91] 63  Resp:  [16-20] 18  BP: ()/(45-84) 98/52    Physical Exam  Vitals and nursing note reviewed  Constitutional:       General: She is not in acute distress  Appearance: She is obese  HENT:      Nose:      Comments: NC in place     Mouth/Throat:      Mouth: Mucous membranes are moist    Eyes:      Pupils: Pupils are equal, round, and reactive to light  Pulmonary:      Effort: No respiratory distress  Comments: Shallow excursion   Abdominal:      General: Abdomen is flat  Palpations: Abdomen is soft  Neurological:      General: No focal deficit present  Mental Status: She is alert and oriented to person, place, and time  Psychiatric:         Mood and Affect: Mood normal          Behavior: Behavior normal        Lab Results:   Results from last 7 days   Lab Units 07/24/22  0515   WBC Thousand/uL 8 96   HEMOGLOBIN g/dL 10 7*   HEMATOCRIT % 32 5*   PLATELETS Thousands/uL 292      Results from last 7 days   Lab Units 07/26/22  0526 07/24/22  0515 07/23/22  1205   POTASSIUM mmol/L 4 2   < > 4 2   CHLORIDE mmol/L 91*   < > 89*   CO2 mmol/L 31   < > 29   BUN mg/dL 14   < > 7   CREATININE mg/dL 0 70   < > 0 48*   CALCIUM mg/dL 8 7   < > 9 2   ALK PHOS U/L  --   --  137*   ALT U/L  --   --  15   AST U/L  --   --  21    < > = values in this interval not displayed  Imaging Studies: I have personally reviewed pertinent reports      EKG, Pathology, and Other Studies: I have personally reviewed pertinent reports  Please note that the APS provides consultative services regarding pain management only  With the exception of ketamine, peripheral nerve catheters, and epidural infusions (and except when indicated), final decisions regarding starting or changing doses of analgesic medications are at the discretion of the consulting service  Off hours consultation and/or medication management is generally not available      Adriana Lara MD  Acute Pain Service

## 2022-07-26 NOTE — CONSULTS
Consultation - Nephrology   Luis Zambrano 80 y o  female MRN: [id number]  Unit/Bed#: S -01 Encounter: [id number]    ASSESSMENT/PLAN:  Hyponatremia:  Suspect multifactorial with component SIADH in setting of pain  -admission sodium 127 @1200 on 7/24/2022-->124 @1755-->127 0700 7/25-->125-->125 today   -Baseline sodium appears to be 135-142 meq  -Etiology of hyponatremia unclear at this time  Likely due to pre-renal azotemia + medication induced + increased ADH due to pain and nausea + SIADH + poor solute intake + polydipsia  -workup: serum osmolality pending, urine osmolality 295, urine sodium 30  -check uric acid, TSH   -No acute indication for Hypertonic saline (HTS) at this time    -continue salt tablets  Currently on 1 g t i d ,  increase to 2 g starting now  -change Fluid restriction of 1 2L/day  -avoid use of NSAIDs  -Strict I/O  -Check BMP Q 8  -Goal to raise sodium by 8 meq in the next  24 hours  Avoid overcorrection >8 meq   -Call if sodium is < 125 or  > 132 in the next 24 hours  Goal sNa 133 by 0700 7/27/22  -Baseline creatinine 0 6-  0 8     Most recent creatinine 0 70  -continue to monitor closely  -d/w Dr Alondra Arora    CKD II/solitary right kidney:  -s/p remote left nephrectomy for benign disease  -baseline creatinine appears 0 6-0 8  -follows with [facility], last seen 9/21/21  -avoid nephrotoxins, NSAIDs, hypotension, IV contrast if possible  -will require outpatient follow-up with established nephrologist at hospital discharge    Hypertension/volume status:  -BP fluctuating with transient periods of relative hypotension but primarily acceptable  -clinically, examines euvolemic  -home medications:  Amlodipine 2 5 mg daily, diltiazem 120 mg q 6 hours, Lasix 20 mg b i d   -current medications:  Amlodipine 2 5 mg daily, diltiazem 120 mg q 6 hours, Lasix 20 mg b i d   -avoid hypotension, maintain MAP >65  -continue to monitor    Chronic diastolic congestive heart failure:  -compensated  -home diuretics:  Lasix 20 mg b i d   -volume status euvolemic  -management per primary team    Fall with closed T3 which compression fracture and multiple left rib fractures:  -non operative management of T3 fracture per neuro surgery  TLSO brace  - PT/OT  -pain control  Recommend avoiding NSAIDs in setting of significant hyponatremia  -management per trauma team      HISTORY OF PRESENT ILLNESS:  Requesting Physician: Bernard Borjas DO  Reason for Consult:  Acute hyponatremia    Keyona Winkler is a 80y o  year old female with solitary right kidney, s/p remote left nephrectomy for benign disease, HTN, hypothyroidism, dCHF who was admitted to [facility] after presenting with fall at home with resulting multiple left rib fractures and T3 which compression fracture  Patient previously evaluated discharged on 7/14/2022 after mechanical fall found to have chronic T8 compression fracture  Serum sodium 127 on admission which decreased to 124  Salt tablets 1 g t i d  Initiated and 500 NSS bolus x 1 with improvement of serum sodium to 127  Serum sodium has now subsequently decreased to 125  Patient remains on salt tablets  Baseline serum sodium appears 135-142  Baseline renal function normal with creatinine 0 67-0 8  Patient denies chronic NSAID use although patient poor historian  Patient denies prior history of hyponatremia  Patient followed by [facility] related to CKD 2 and prior left nephrectomy  Patient currently denies nausea , vomiting, pain, weakness, tremors, confusion  A renal consultation is requested today for assistance in the management of hyponatremia      PAST MEDICAL HISTORY:  Past Medical History:   Diagnosis Date    CHF [facility]congestive heart failure) ([facility] [address] [facility]     Dementia without behavioral disturbance (HCC)     High cholesterol     Hypertension     Hypothyroid     Stage 3 chronic kidney disease, unspecified whether stage 3a or 3b CKD ([facility] [address] )     Stroke ([facility] [address] ) 09/21/2021       PAST SURGICAL HISTORY:  Past Surgical History:   Procedure Laterality Date    APPENDECTOMY      CHOLECYSTECTOMY      NEPHRECTOMY Left     TOTAL ABDOMINAL HYSTERECTOMY W/ BILATERAL SALPINGOOPHORECTOMY         ALLERGIES:  Allergies   Allergen Reactions    Penicillins Hives       SOCIAL HISTORY:  Social History     Substance and Sexual Activity   Alcohol Use Not Currently     Social History     Substance and Sexual Activity   Drug Use Never     Social History     Tobacco Use   Smoking Status Never Smoker   Smokeless Tobacco Never Used       FAMILY HISTORY:  No family history on file      MEDICATIONS:    Current Facility-Administered Medications:     acetaminophen (TYLENOL) tablet 975 mg, 975 mg, Oral, Q8H [address], Ruiz Giordano PA-C, 975 mg at 07/26/22 0512    amLODIPine (NORVASC) tablet 2 5 mg, 2 5 mg, Oral, Daily, Ruiz Giordano PA-C, 2 5 mg at 07/26/22 0853    atorvastatin (LIPITOR) tablet 20 mg, 20 mg, Oral, Daily, ALYSIA Shannon-C, 20 mg at 07/26/22 0854    cyanocobalamin injection 1,000 mcg, 1,000 mcg, Intramuscular, Q30 Days, ALYSIA Shannon-C, 1,000 mcg at 07/23/22 1744    Diclofenac Sodium (VOLTAREN) 1 % topical gel 2 g, 2 g, Topical, 4x Daily, Ruiz Giordano PA-C, 2 g at 07/26/22 1228    diltiazem (CARDIZEM) tablet 120 mg, 120 mg, Oral, Q6H [address], Ruiz Giordano PA-C, 120 mg at 07/26/22 1227    docusate sodium (COLACE) capsule 100 mg, 100 mg, Oral, BID, Ruiz Giordano PA-C, 100 mg at 07/26/22 0854    furosemide (LASIX) tablet 20 mg, 20 mg, Oral, BID, Ruiz Giordano PA-C, 20 mg at 07/26/22 0853    guaiFENesin (MUCINEX) 12 hr tablet 600 mg, 600 mg, Oral, Q12H [address], Dickson Medina, CRNP, 600 mg at 07/26/22 0945    heparin (porcine) subcutaneous injection 5,000 Units, 5,000 Units, Subcutaneous, Q8H [address], Ruiz Giordano PA-C, 5,000 Units at 07/26/22 0512    HYDROmorphone HCl (DILAUDID) injection 0 2 mg, 0 2 mg, Intravenous, Q4H PRN, Ritika Hurley PA-C    levothyroxine tablet 50 mcg, 50 mcg, Oral, Daily, Ruiz Quinn PA-C, 50 mcg at 07/26/22 5448   lidocaine (LIDODERM) 5 % patch 2 patch, 2 patch, Topical, Daily, Phuc Rodgers PA-C, 2 patch at 07/26/22 0854    memantine (NAMENDA) tablet 10 mg, 10 mg, Oral, BID, ALYSIA Shannon-C, 10 mg at 07/26/22 0854    methocarbamol (ROBAXIN) tablet 250 mg, 250 mg, Oral, Q6H [address], ALYSIA Shannon-C, 250 mg at 07/26/22 1227    naloxone (NARCAN) 0 04 mg/mL syringe 0 04 mg, 0 04 mg, Intravenous, Q1MIN PRN, Phuc Rodgers PA-C    ondansetron (ZOFRAN) injection 4 mg, 4 mg, Intravenous, Q4H PRN, ALYSIA Rodríguez-C, 4 mg at 07/24/22 1636    oxyCODONE (ROXICODONE) IR tablet 2 5 mg, 2 5 mg, Oral, Q4H PRN, Phuc Rodgers PA-C    oxyCODONE (ROXICODONE) IR tablet 5 mg, 5 mg, Oral, Q4H PRN, Phuc Rodgers PA-C, 5 mg at 07/26/22 0050    pantoprazole (PROTONIX) EC tablet 40 mg, 40 mg, Oral, Daily, ALYSIA Shannon-C, 40 mg at 07/26/22 0854    polyethylene glycol (MIRALAX) packet 17 g, 17 g, Oral, Daily PRN, Phuc Rodgers PA-C, 17 g at 07/23/22 1744    senna (SENOKOT) tablet 17 2 mg, 2 tablet, Oral, Daily, Ruiz Giordano PA-C, 17 2 mg at 07/26/22 7980    sodium chloride tablet 1 g, 1 g, Oral, TID With Meals, ALYSIA Mac-C, 1 g at 07/26/22 1227    REVIEW OF SYSTEMS:  A complete 10 point review of systems was performed and found to be negative unless otherwise noted in the history of present illness  General: No fevers, chills  Cardiovascular:  No chest pain, No leg edema  Respiratory: No cough, sputum production,  No shortness of breath  Gastrointestinal:  No nausea/vomiting,  No diarrhea,  No abdominal pain  Genitourinary: No hematuria  No foamy urine    No dysuria    PHYSICAL EXAM:  Current Weight:    First Weight:    Vitals:    07/26/22 0702 07/26/22 0703 07/26/22 0853 07/26/22 1056   BP: (!) 105/45 98/52 125/56 113/55   BP Location:  Left arm     Pulse: 63   76   Resp: 18   18   Temp: 98 2 °F (36 8 °C)   98 4 °F (36 9 °C)   TempSrc:       SpO2: 94%   95%       Intake/Output Summary (Last 24 hours) at 7/26/2022 1235  Last data filed at 7/26/2022 0612  Gross per 24 hour   Intake 960 ml   Output 470 ml   Net 490 ml     General:  Awake, alert, appears comfortable and in no acute distress  Nontoxic  Skin:  No rash, warm, good skin turgor   Eyes:  PERRL, EOMI, sclerae nonicteric   no periorbital edema   ENT:  Moist mucous membranes  Neck:  Trachea midline, symmetric  No JVD  No carotid bruits  Chest:  Clear to auscultation bilaterally without wheezes, crackles or rhonchi  CVS:  Regular rate and rhythm without murmur, gallop or rub  S1 and S2 identified and normal   No S3, S4    Abdomen:  Soft, nontender, nondistended without masses  Normal bowel sounds x 4 quadrants  No bruit  Extremities:  Warm, pink, motor and sensory intact and well perfused  No cyanosis, pallor  No BLE edema  Neuro:  Awake, alert, oriented x3  Grossly intact  Psych:  Appropriate affect  Mentating appropriately  Normal mental status exam    Invasive Devices:  None       Lab Results:   Results from last 7 days   Lab Units 07/26/22  0526 07/25/22  1423 07/25/22  0507 07/24/22  1755 07/24/22  0515 07/23/22  1205   WBC Thousand/uL  --   --   --   --  8 96 11 71*   HEMOGLOBIN g/dL  --   --   --   --  10 7* 12 9   HEMATOCRIT %  --   --   --   --  32 5* 38 3   PLATELETS Thousands/uL  --   --   --   --  292 286   SODIUM mmol/L 125* 125* 127*   < > 127* 127*   POTASSIUM mmol/L 4 2 4 0 4 2   < > 3 9 4 2   CHLORIDE mmol/L 91* 92* 92*   < > 91* 89*   CO2 mmol/L 31 29 31   < > 32 29   BUN mg/dL 14 13 12   < > 11 7   CREATININE mg/dL 0 70 0 78 0 64   < > 0 68 0 48*   CALCIUM mg/dL 8 7 8 4 8 6   < > 8 7 9 2   ALK PHOS U/L  --   --   --   --   --  137*   ALT U/L  --   --   --   --   --  15   AST U/L  --   --   --   --   --  21    < > = values in this interval not displayed  Imaging Studies:  CT chest, abdomen, pelvis without contrast 7/14/2022:  Imaging study reviewed  Absent left kidney    1 9 cm exophytic right upper pole renal cyst   Marked T8 compression fracture, mild posterior bone displacement  Possible old rib fracture    CT thoracic spine 7/23/2022:  Imaging study reviewed  Progression of mild compression T3 vertebral fracture  Stable compression fractures T4, T9 vertebral bodies  CT chest without contrast 7/23/2022:  Imaging study reviewed  Mild compression teeth 3 fracture  Acute fractures of L5 and 7th ribs  No evidence of pneumothorax    EMR, including Epic and Care Everywhere, reviewed  I have personally reviewed the blood work as stated above and in my note  I have personally reviewed CT chest, abdomen, pelvis x 2 and Ct thoracic spine imaging studies  I have personally reviewed internal Medicine, co-consultants and prior nephrology notes

## 2022-07-26 NOTE — CASE MANAGEMENT
Case Management Progress Note    Patient name Aidee Che  Location S /S -01 MRN [id number]  : 1933 Date 2022       LOS (days): 3  Geometric Mean LOS (GMLOS) (days): 3 10  Days to GMLOS:0        OBJECTIVE:        Current admission status: Inpatient  Preferred Pharmacy: No Pharmacies Listed  Primary Care Provider: Brandi Warner MD    Primary Insurance: [facility]  Secondary Insurance:     PROGRESS NOTE:    Cm continues to work on [employer]  Cm confirmed that insurance authorization was initiated  Cm awaiting confirmation of insurance auth

## 2022-07-26 NOTE — DISCHARGE INSTRUCTIONS
Neurosurgery discharge instructions following neck/spine fracture:     VISTA collar and TLSO brace when sitting upright for comfort only  No bending, twisting or heavy lifting  No pushing or pulling over 10lbs  No strenuous activities  NO DRIVING  **Please notify MD immediately if you have increased neck or arm pain  New numbness and/or weakness in your arm  Difficulty swallowing or breathing especially while lying down  Numbness or weakness in arms or legs   Increased difficulty walking **

## 2022-07-26 NOTE — PLAN OF CARE
Problem: OCCUPATIONAL THERAPY ADULT  Goal: Performs self-care activities at highest level of function for planned discharge setting  See evaluation for individualized goals  Description: Treatment Interventions: ADL retraining, Functional transfer training, UE strengthening/ROM, Endurance training, Cognitive reorientation, Patient/family training, Equipment evaluation/education, Compensatory technique education, Energy conservation, Activityengagement          See flowsheet documentation for full assessment, interventions and recommendations  Note: Limitation: Decreased ADL status, Decreased Safe judgement during ADL, Decreased cognition, Decreased endurance, Decreased self-care trans, Decreased high-level ADLs  Prognosis: Good  Assessment: Pt is a 80 y o  female seen for OT evaluation s/p admission to [address] [place] [address] on 7/23/2022 due to fall  Pt diagnosed with Closed fracture of multiple ribs of left side  Pt has a significant PMH impacting occupational performance including: HTN, CKD, CHF, hypothyroid, & compression fx of T3 vertebra  Pt with no recent admissions in the last 6 months  Pt with active OT evaluation and treatment orders and activity orders for OOB to chair  Prior to completing the IE, an extensive review of medical and/or therapy records and physical, cognitive, and psychosocial information related to pt functional performance was completed  The pt is considered a high complexity evaluation due to assessments used during IE and observed and listed functional performance deficits  PTA, pt reports living in assisted living facility and being (A) for ADLs & IADLs, (+) use of RW at baseline, & (-) driving  Pt is motivated to return to home  Personal and environmental factors supporting pt at time of IE include social support and accessible home environment   Personal and environmental factors inhibiting engagement in occupations include advanced age, difficulty completing ADLs and difficulty completing IADLs  During evaluation pt performed as is outlined above in flowsheet  Pt required VC for safety, VC for attention to task and education on bed mobility techniques to reduce pain, RW use to continue engaging in tasks  Standardized assessments used to assist in identifying performance deficits include AMPAC 6-Clicks and Barthel ADL Index  Performance deficits that affect the pts occupational performance during the initial evaluation include impaired balance, functional mobility, endurance, activity tolerance, forward functional reach, functional standing tolerance and overall strength, attention to task, safety awareness, insight into deficits and problem solving  Based on pts functional performance and deficits the following occupations will be addressed in OT treatments in order to maximize pts independence and overall occupational performance: grooming, bathing/showering, toileting and toilet hygiene, dressing and functional mobility  Upon discharge from acute care setting, OT recommendation on d/c to Short Term Rehab  Pt goals are listed below       OT Discharge Recommendation: Post acute rehabilitation services

## 2022-07-26 NOTE — OCCUPATIONAL THERAPY NOTE
Occupational Therapy Evaluation     Patient Name: Scot Culp  OAYMF'H Date: 7/26/2022  Problem List  Principal Problem:    Closed fracture of multiple ribs of left side  Active Problems:    Fall    Closed wedge compression fracture of T3 vertebra (HCC)    HTN (hypertension)    CKD (chronic kidney disease) stage 3, GFR 30-59 ml/min (HCC)    Chronic congestive heart failure ([facility] )    Hypothyroid    Hyponatremia    Past Medical History  Past Medical History:   Diagnosis Date    CHF (congestive heart failure) (HCC)     Dementia without behavioral disturbance (HCC)     High cholesterol     Hypertension     Hypothyroid     Stage 3 chronic kidney disease, unspecified whether stage 3a or 3b CKD ([facility] )     Stroke ([facility] ) 09/21/2021     Past Surgical History  Past Surgical History:   Procedure Laterality Date    APPENDECTOMY      CHOLECYSTECTOMY      NEPHRECTOMY Left     TOTAL ABDOMINAL HYSTERECTOMY W/ BILATERAL SALPINGOOPHORECTOMY          07/26/22 0830   OT Last Visit   OT Visit Date 07/26/22   Note Type   Note type Evaluation   Restrictions/Precautions   Weight Bearing Precautions Per Order No   Braces or Orthoses Other (Comment)  (backpack TLSO for comfort purposes deferred by trauma & family at this time)   Other Precautions Cognitive; Chair Alarm; Bed Alarm;O2;Fall Risk;Pain   Pain Assessment   Pain Assessment Tool FLACC   Pain Score No Pain   Pain Location/Orientation Orientation: Left; Location: [facility]   Hospital Pain Intervention(s) Repositioned; Ambulation/increased activity   Pain Rating: FLACC (Rest) - Face 0   Pain Rating: FLACC (Rest) - Legs 0   Pain Rating: FLACC (Rest) - Activity 0   Pain Rating: FLACC (Rest) - Cry 0   Pain Rating: FLACC (Rest) - Consolability 0   Score: FLACC (Rest) 0   Pain Rating: FLACC (Activity) - Face 1   Pain Rating: FLACC (Activity) - Legs 0   Pain Rating: FLACC (Activity) - Activity 0   Pain Rating: FLACC (Activity) - Cry 1   Pain Rating: FLACC (Activity) - Consolability 1 Score: FLACC (Activity) 3   Home Living   Type of Home Assisted living  ([facility])   Home Layout One level;Performs ADLs on one level; Able to live on main level with bedroom/bathroom  (0 SERGIO)   Bathroom Shower/Tub Walk-in shower   Bathroom Toilet Standard   Bathroom Equipment Grab bars in shower; Shower chair;Grab bars around toilet   [address] Walker;Grab bars;Quad cane   Additional Comments pt uses RW at baseline   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; 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)   ADL Assistance Needs assistance  ((A) with showering, dressing)   IADLs Needs assistance  ((A) with driving, cooking, cleaning)   Falls in the last 6 months 1 to 4  (3 or 4 falls)   Vocational Retired  ()   Comments (A) with ADLs & IADLs, (+) use of RW at baseline, (-) driving   Lifestyle   Autonomy pt reports living in assisted living facility and being (A) for ADLs & IADLs, (+) use of RW at baseline, & (-) driving   Reciprocal Relationships family   Service to Others retired   Intrinsic Gratification watching tv   Subjective   Subjective "I had 5 sons so I had no time for anything"   ADL   Eating Assistance 5  Supervision/Setup   Grooming Assistance 3  Moderate Assistance  (combing hair)   Grooming Deficit Setup;Verbal cueing;Supervision/safety; Increased time to complete;Brushing hair  ((A) with combing back of hair)   UB Bathing Assistance 3  Moderate Assistance   LB Bathing Assistance 2  Maximal Assistance   UB Dressing Assistance 3  Moderate Assistance   LB Dressing Assistance 2  Maximal Assistance  (donning underwear)   LB Dressing Deficit Setup;Steadying;Verbal cueing;Supervision/safety; Increased time to complete; Thread RLE into underwear; Thread LLE into underwear  (pt able to (A) with pulling underwear over hips, required (A) to pull up hips in back, pt reports using long-handled reacher at home for (A) with donning LB clothing) Toileting Assistance  3  Moderate Assistance   Additional Comments While unable to assess eating, UB bathing, LB bathing, UB dressing and toileting at time of evaluation, with use of clinical reasoning, pt's performance throughout evaluation indicates that pt may be able to perform these tasks at the levels listed above   Bed Mobility   Supine to Sit 3  Moderate assistance   Additional items HOB elevated; Increased time required;Verbal cues;LE management;Assist x 2  (VCs for log rolling)   Sit to Supine Unable to assess   Additional Comments pt seated OOB in recliner at end of session   Transfers   Sit to Stand 3  Moderate assistance   Additional items Assist x 1; Increased time required;Verbal cues; Bedrails   Stand to Sit 4  Minimal assistance   Additional items Assist x 1; Armrests; Increased time required;Verbal cues   Additional Comments w/ RW, VCs for body positioning & hand placement on RW   Functional Mobility   Functional Mobility 4  Minimal assistance   Additional Comments Ax1, moving 4-5 steps from bed > recliner, w/ RW for stability, VCs for hand placement and body positioning w/ RW   Additional items Rolling walker   Balance   Static Sitting Fair -   Dynamic Sitting Poor +   Static Standing Poor +   Dynamic Standing Poor +   Ambulatory Poor +   Activity Tolerance   Activity Tolerance Patient limited by fatigue;Patient limited by pain   Nurse Made Aware LAURA Wolff   RUE Assessment   RUE Assessment WFL   LUE Assessment   LUE Assessment X   LUE Strength   LUE Overall Strength Due to pain  (pain in LUE shoulder area, limited activity tolerance, bed mobility, and ability to  RW)   Hand Function   Gross Motor Coordination Functional   Fine Motor Coordination Functional   Cognition   Overall Cognitive Status Impaired   Arousal/Participation Alert; Cooperative   Attention Attends with cues to redirect   Orientation Level Oriented X4  (grossly oriented to time, able to state year but unable to recall month/date) Memory Decreased short term memory;Decreased recall of precautions   Following Commands Follows one step commands with increased time or repetition   Comments pt pleasant and cooperative, at times pt believing she is at [employer] but is easily reioriented, decreased recall of precautions   Assessment   Limitation Decreased ADL status; Decreased Safe judgement during ADL;Decreased cognition;Decreased endurance;Decreased self-care trans;Decreased high-level ADLs   Prognosis Good   Assessment Pt is a 80 y o  female seen for OT evaluation s/p admission to [address] [place] [address] on 7/23/2022 due to fall  Pt diagnosed with Closed fracture of multiple ribs of left side  Pt has a significant PMH impacting occupational performance including: HTN, CKD, CHF, hypothyroid, & compression fx of T3 vertebra  Pt with no recent admissions in the last 6 months  Pt with active OT evaluation and treatment orders and activity orders for OOB to chair  Prior to completing the IE, an extensive review of medical and/or therapy records and physical, cognitive, and psychosocial information related to pt functional performance was completed  The pt is considered a high complexity evaluation due to assessments used during IE and observed and listed functional performance deficits  PTA, pt reports living in assisted living facility and being (A) for ADLs & IADLs, (+) use of RW at baseline, & (-) driving  Pt is motivated to return to home  Personal and environmental factors supporting pt at time of IE include social support and accessible home environment  Personal and environmental factors inhibiting engagement in occupations include advanced age, difficulty completing ADLs and difficulty completing IADLs  During evaluation pt performed as is outlined above in flowsheet  Pt required VC for safety, VC for attention to task and education on bed mobility techniques to reduce pain, RW use to continue engaging in tasks   Standardized assessments used to assist in identifying performance deficits include AMPAC 6-Clicks and Barthel ADL Index  Performance deficits that affect the pts occupational performance during the initial evaluation include impaired balance, functional mobility, endurance, activity tolerance, forward functional reach, functional standing tolerance and overall strength, attention to task, safety awareness, insight into deficits and problem solving  Based on pts functional performance and deficits the following occupations will be addressed in OT treatments in order to maximize pts independence and overall occupational performance: grooming, bathing/showering, toileting and toilet hygiene, dressing and functional mobility  Upon discharge from acute care setting, OT recommendation on d/c to Short Term Rehab  Pt goals are listed below  Goals   Patient Goals to get back home   LTG Time Frame 10-14   Long Term Goal Refer to goals below   Plan   Treatment Interventions ADL retraining;Functional transfer training;UE strengthening/ROM; Endurance training;Cognitive reorientation;Patient/family training;Equipment evaluation/education; Compensatory technique education; Energy conservation; Activityengagement   Goal Expiration Date 08/05/22   OT Treatment Day 0   OT Frequency 3-5x/wk   Recommendation   OT Discharge Recommendation Post acute rehabilitation services   AM-PAC Daily Activity Inpatient   Lower Body Dressing 2   Bathing 2   Toileting 2   Upper Body Dressing 2   Grooming 2   Eating 3   Daily Activity Raw Score 13   Daily Activity Standardized Score (Calc for Raw Score >=11) 32 03   AM-PAC Applied Cognition Inpatient   Following a Speech/Presentation 3   Understanding Ordinary Conversation 3   Taking Medications 3   Remembering Where Things Are Placed or Put Away 2   Remembering List of 4-5 Errands 2   Taking Care of Complicated Tasks 1   Applied Cognition Raw Score 14   Applied Cognition Standardized Score 32 02   Barthel Index   Feeding 5 Bathing 0   Grooming Score 0   Dressing Score 5   Bladder Score 10   Bowels Score 10   Toilet Use Score 10   Transfers (Bed/Chair) Score 5   Mobility (Level Surface) Score 0   Stairs Score 0   Barthel Index Score 45     Goals: Goals are developed to assist pt in reaching goal to return home    -Patient will be Min A with toileting tasks with use of AD/AE as needed to increase active participation in ADLs  -Patient will be Min A for UB bathing and dressing with use of AD/AE as needed to increase (I) with ADLs  -Patient will complete LB dressing and bathing with mod A to improve active ADL participation    -Patient will participate in ongoing cognitive evaluation to assist with safe d/c planning     -Caregiver/family will provide client with appropriate VCs during functional tasks to facilitate pt's I in ADLs/IADLs     -Patient will attend to grooming tasks for 5 min without the need for redirection to improve activity tolerance     -Patient will transfer from bed to chair with supervision and use of AD as needed to improve activity tolerance     -Patient will demonstrate learned safety precautions/body mechanics to improve functional mobility with use of AD as needed     -Patient will be Min Ax1 for bed mobility tasks to improve engagement in ADLs  -Patient will complete grooming tasks standing at sink with use of AD as needed to complete grooming tasks to improve activity tolerance     -Patient will improve OOB tolerance to 3-4 hours per day to increase endurance  The patient's raw score on the AM-PAC Daily Activity inpatient short form is 13, standardized score is 32 03, less than 39 4  Patients at this level are likely to benefit from discharge to post-acute rehabilitation services  Please refer to the recommendation of the Occupational Therapist for safe discharge planning      At the end of the session, all needs met and pt seated in bedside chair, chair alarm activated, LEs elevated , and call bell within reach    Elda Osuna OTS

## 2022-07-27 ENCOUNTER — TELEPHONE (OUTPATIENT)
Dept: NEUROSURGERY | Facility: CLINIC | Age: 87
End: 2022-07-27

## 2022-07-27 LAB
ANION GAP SERPL CALCULATED.3IONS-SCNC: 3 MMOL/L (ref 4–13)
ANION GAP SERPL CALCULATED.3IONS-SCNC: 4 MMOL/L (ref 4–13)
ANION GAP SERPL CALCULATED.3IONS-SCNC: 4 MMOL/L (ref 4–13)
BUN SERPL-MCNC: 10 MG/DL (ref 5–25)
BUN SERPL-MCNC: 11 MG/DL (ref 5–25)
BUN SERPL-MCNC: 9 MG/DL (ref 5–25)
CALCIUM SERPL-MCNC: 8.7 MG/DL (ref 8.4–10.2)
CALCIUM SERPL-MCNC: 8.8 MG/DL (ref 8.4–10.2)
CALCIUM SERPL-MCNC: 8.9 MG/DL (ref 8.4–10.2)
CHLORIDE SERPL-SCNC: 93 MMOL/L (ref 96–108)
CHLORIDE SERPL-SCNC: 94 MMOL/L (ref 96–108)
CHLORIDE SERPL-SCNC: 96 MMOL/L (ref 96–108)
CO2 SERPL-SCNC: 29 MMOL/L (ref 21–32)
CO2 SERPL-SCNC: 31 MMOL/L (ref 21–32)
CO2 SERPL-SCNC: 32 MMOL/L (ref 21–32)
CREAT SERPL-MCNC: 0.61 MG/DL (ref 0.6–1.3)
CREAT SERPL-MCNC: 0.62 MG/DL (ref 0.6–1.3)
CREAT SERPL-MCNC: 0.63 MG/DL (ref 0.6–1.3)
ERYTHROCYTE [DISTWIDTH] IN BLOOD BY AUTOMATED COUNT: 13.9 % (ref 11.6–15.1)
GFR SERPL CREATININE-BSD FRML MDRD: 79 ML/MIN/1.73SQ M
GFR SERPL CREATININE-BSD FRML MDRD: 80 ML/MIN/1.73SQ M
GFR SERPL CREATININE-BSD FRML MDRD: 80 ML/MIN/1.73SQ M
GLUCOSE SERPL-MCNC: 105 MG/DL (ref 65–140)
GLUCOSE SERPL-MCNC: 126 MG/DL (ref 65–140)
GLUCOSE SERPL-MCNC: 91 MG/DL (ref 65–140)
HCT VFR BLD AUTO: 32.2 % (ref 34.8–46.1)
HGB BLD-MCNC: 10.7 G/DL (ref 11.5–15.4)
MCH RBC QN AUTO: 28.2 PG (ref 26.8–34.3)
MCHC RBC AUTO-ENTMCNC: 33.2 G/DL (ref 31.4–37.4)
MCV RBC AUTO: 85 FL (ref 82–98)
PLATELET # BLD AUTO: 271 THOUSANDS/UL (ref 149–390)
PMV BLD AUTO: 8.7 FL (ref 8.9–12.7)
POTASSIUM SERPL-SCNC: 4.1 MMOL/L (ref 3.5–5.3)
POTASSIUM SERPL-SCNC: 4.2 MMOL/L (ref 3.5–5.3)
POTASSIUM SERPL-SCNC: 4.3 MMOL/L (ref 3.5–5.3)
RBC # BLD AUTO: 3.8 MILLION/UL (ref 3.81–5.12)
SODIUM SERPL-SCNC: 127 MMOL/L (ref 135–147)
SODIUM SERPL-SCNC: 128 MMOL/L (ref 135–147)
SODIUM SERPL-SCNC: 131 MMOL/L (ref 135–147)
TSH SERPL DL<=0.05 MIU/L-ACNC: 2.82 UIU/ML (ref 0.45–4.5)
URATE SERPL-MCNC: 5.2 MG/DL (ref 2–7.5)
WBC # BLD AUTO: 7.42 THOUSAND/UL (ref 4.31–10.16)

## 2022-07-27 PROCEDURE — 80048 BASIC METABOLIC PNL TOTAL CA: CPT | Performed by: PHYSICIAN ASSISTANT

## 2022-07-27 PROCEDURE — 80048 BASIC METABOLIC PNL TOTAL CA: CPT | Performed by: INTERNAL MEDICINE

## 2022-07-27 PROCEDURE — 84443 ASSAY THYROID STIM HORMONE: CPT | Performed by: PHYSICIAN ASSISTANT

## 2022-07-27 PROCEDURE — 85027 COMPLETE CBC AUTOMATED: CPT | Performed by: NURSE PRACTITIONER

## 2022-07-27 PROCEDURE — 97110 THERAPEUTIC EXERCISES: CPT

## 2022-07-27 PROCEDURE — 99232 SBSQ HOSP IP/OBS MODERATE 35: CPT | Performed by: INTERNAL MEDICINE

## 2022-07-27 PROCEDURE — 84550 ASSAY OF BLOOD/URIC ACID: CPT | Performed by: PHYSICIAN ASSISTANT

## 2022-07-27 PROCEDURE — 97116 GAIT TRAINING THERAPY: CPT

## 2022-07-27 PROCEDURE — 99232 SBSQ HOSP IP/OBS MODERATE 35: CPT | Performed by: SURGERY

## 2022-07-27 PROCEDURE — 80048 BASIC METABOLIC PNL TOTAL CA: CPT | Performed by: NURSE PRACTITIONER

## 2022-07-27 RX ORDER — DILTIAZEM HYDROCHLORIDE 60 MG/1
120 TABLET, FILM COATED ORAL EVERY 6 HOURS SCHEDULED
Status: DISCONTINUED | OUTPATIENT
Start: 2022-07-27 | End: 2022-07-30 | Stop reason: HOSPADM

## 2022-07-27 RX ORDER — TORSEMIDE 20 MG/1
20 TABLET ORAL DAILY
Status: DISCONTINUED | OUTPATIENT
Start: 2022-07-27 | End: 2022-07-30 | Stop reason: HOSPADM

## 2022-07-27 RX ORDER — TORSEMIDE 20 MG/1
20 TABLET ORAL DAILY
Status: DISCONTINUED | OUTPATIENT
Start: 2022-07-27 | End: 2022-07-27

## 2022-07-27 RX ADMIN — ACETAMINOPHEN 975 MG: 325 TABLET ORAL at 13:07

## 2022-07-27 RX ADMIN — GUAIFENESIN 600 MG: 600 TABLET ORAL at 08:09

## 2022-07-27 RX ADMIN — DICLOFENAC SODIUM TOPICAL GEL, 1%, 2 G: 10 GEL TOPICAL at 21:05

## 2022-07-27 RX ADMIN — GUAIFENESIN 600 MG: 600 TABLET ORAL at 21:03

## 2022-07-27 RX ADMIN — DILTIAZEM HYDROCHLORIDE 120 MG: 60 TABLET, FILM COATED ORAL at 17:46

## 2022-07-27 RX ADMIN — HEPARIN SODIUM 5000 UNITS: 5000 INJECTION INTRAVENOUS; SUBCUTANEOUS at 13:07

## 2022-07-27 RX ADMIN — DOCUSATE SODIUM 100 MG: 100 CAPSULE, LIQUID FILLED ORAL at 08:09

## 2022-07-27 RX ADMIN — ACETAMINOPHEN 975 MG: 325 TABLET ORAL at 05:22

## 2022-07-27 RX ADMIN — OXYCODONE HYDROCHLORIDE 5 MG: 5 TABLET ORAL at 00:02

## 2022-07-27 RX ADMIN — LEVOTHYROXINE SODIUM 50 MCG: 50 TABLET ORAL at 05:22

## 2022-07-27 RX ADMIN — DILTIAZEM HYDROCHLORIDE 120 MG: 60 TABLET, FILM COATED ORAL at 00:02

## 2022-07-27 RX ADMIN — DICLOFENAC SODIUM TOPICAL GEL, 1%, 2 G: 10 GEL TOPICAL at 13:09

## 2022-07-27 RX ADMIN — METHOCARBAMOL 250 MG: 500 TABLET ORAL at 23:45

## 2022-07-27 RX ADMIN — SODIUM CHLORIDE 2 G: 1 TABLET ORAL at 13:07

## 2022-07-27 RX ADMIN — SODIUM CHLORIDE 2 G: 1 TABLET ORAL at 08:09

## 2022-07-27 RX ADMIN — ACETAMINOPHEN 975 MG: 325 TABLET ORAL at 21:03

## 2022-07-27 RX ADMIN — PANTOPRAZOLE SODIUM 40 MG: 40 TABLET, DELAYED RELEASE ORAL at 08:09

## 2022-07-27 RX ADMIN — HEPARIN SODIUM 5000 UNITS: 5000 INJECTION INTRAVENOUS; SUBCUTANEOUS at 05:22

## 2022-07-27 RX ADMIN — SODIUM CHLORIDE 2 G: 1 TABLET ORAL at 17:47

## 2022-07-27 RX ADMIN — TORSEMIDE 20 MG: 20 TABLET ORAL at 17:46

## 2022-07-27 RX ADMIN — MEMANTINE 10 MG: 10 TABLET ORAL at 17:46

## 2022-07-27 RX ADMIN — DOCUSATE SODIUM 100 MG: 100 CAPSULE, LIQUID FILLED ORAL at 17:46

## 2022-07-27 RX ADMIN — DICLOFENAC SODIUM TOPICAL GEL, 1%, 2 G: 10 GEL TOPICAL at 08:17

## 2022-07-27 RX ADMIN — METHOCARBAMOL 250 MG: 500 TABLET ORAL at 13:07

## 2022-07-27 RX ADMIN — METHOCARBAMOL 250 MG: 500 TABLET ORAL at 17:46

## 2022-07-27 RX ADMIN — SENNOSIDES 17.2 MG: 8.6 TABLET, FILM COATED ORAL at 08:09

## 2022-07-27 RX ADMIN — MEMANTINE 10 MG: 10 TABLET ORAL at 08:08

## 2022-07-27 RX ADMIN — ATORVASTATIN CALCIUM 20 MG: 20 TABLET, FILM COATED ORAL at 08:09

## 2022-07-27 RX ADMIN — DILTIAZEM HYDROCHLORIDE 120 MG: 60 TABLET, FILM COATED ORAL at 05:22

## 2022-07-27 RX ADMIN — DICLOFENAC SODIUM TOPICAL GEL, 1%, 2 G: 10 GEL TOPICAL at 17:51

## 2022-07-27 RX ADMIN — HEPARIN SODIUM 5000 UNITS: 5000 INJECTION INTRAVENOUS; SUBCUTANEOUS at 21:04

## 2022-07-27 RX ADMIN — LIDOCAINE 5% 2 PATCH: 700 PATCH TOPICAL at 08:09

## 2022-07-27 NOTE — PROGRESS NOTES
NEPHROLOGY PROGRESS NOTE   Krystal Colin 80 y o  female MRN: [id number]  Unit/Bed#: S -01 Encounter: [id number]  Reason for Consult:  Hyponatremia    80year old female with solitary right kidney, s/p remote left nephrectomy for benign disease, HTN, hypothyroidism, dCHF, multiple falls with L4, L9 compression fx 7/14/22 presenting with fall at home and associated multiple left rib fractures and T3 which compression fracture  Nephrology consulted for management of hyponatremia    ASSESSMENT/PLAN:  Hyponatremia, hypoosmolar:  Suspect multifactorial with component SIADH in setting of pain and component of pulmonary disease  -admission sodium 127 @1200 on 7/24/2022-->124 @1755-->127 0700 7/25-->125 7/26-->128 today, appropriate correction rate  -Baseline sodium appears to be 135-142 meq  -workup: serum osmolality 271, urine osmolality 295, urine sodium 30  -check uric acid 5 2, TSH normal (2 8), random cortisol 16 3   -Imaging:  No overt signs of malignancy  + diffuse honeycombing throughout the lungs bilaterally with bronchiectatic and fibrotic changes  +1 9 cm exophytic right upper pole renal cyst   -No acute indication for Hypertonic saline (HTS) at this time    -continue salt tablets 2gm TID  -continue Fluid restriction of 1 2L/day  -avoid use of NSAIDs  -Strict I/O  -Check BMP Q 8  -Goal to raise sodium by 8 meq in the next  24 hours  Avoid overcorrection >8 meq   -Call if sodium is < 128 or  > 136 in the next 24 hours  Goal sNa 136 by 0700 7/28/22  -Baseline creatinine 0 6-  0 8   Most recent creatinine 0 63  -continue to monitor closely  -d/w son at bedside    All questions answered  -d/w Dr Papi Stewart     CKD II/solitary right kidney:  -s/p remote left nephrectomy for benign disease  -baseline creatinine appears 0 6-0 8  -follows with [facility], last seen 9/21/21  -avoid nephrotoxins, NSAIDs, hypotension, IV contrast if possible  -will require outpatient follow-up with established nephrologist at hospital discharge     Hypertension/volume status:  -BP fluctuating with transient periods of relative hypotension but primarily acceptable  -clinically, examines euvolemic  -home medications:  Amlodipine 2 5 mg daily, diltiazem 120 mg q 6 hours, Lasix 20 mg b i d   -current medications: diltiazem 120 mg q 6 hours, Lasix 20 mg b i d   -amlodipine held   -avoid hypotension, maintain MAP >65  -adjust hold parameters on antihypertensives for SBP <130  -continue to monitor     Chronic diastolic congestive heart failure:  -compensated  -home diuretics:  Lasix 20 mg b i d   -volume status euvolemic  -management per primary team     Fall with closed T3 which compression fracture and multiple left rib fractures:  -non operative management of T3 fracture per neurosurgery  fx stable on plain films  -hx chronic T4, T9 fx, stable  -TLSO brace  - PT/OT  -pain control  Recommend avoiding NSAIDs in setting of significant hyponatremia  -management per trauma team     SUBJECTIVE:  Patient awake, alert without complaints  Denies pain currently  Serum sodium stable and slowly trending up at 128 a m  this am   VSS    A complete review of systems was performed  Pertinent positives and negatives noted in the HPI  Otherwise the review of systems was negative  OBJECTIVE:  Current Weight:    Vitals:    07/27/22 0524 07/27/22 0724 07/27/22 0813 07/27/22 1122   BP: 122/63 (!) 107/43 (!) 93/49 118/59   Pulse: 74 71 76 78   Resp:  18     Temp:  98 3 °F (36 8 °C)  98 8 °F (37 1 °C)   TempSrc:       SpO2: 95% 92% 94% 95%   Height:           Intake/Output Summary (Last 24 hours) at 7/27/2022 1232  Last data filed at 7/27/2022 0529  Gross per 24 hour   Intake 660 ml   Output 1050 ml   Net -390 ml     General:  Awake, alert, appears comfortable and in no acute distress  Nontoxic    Skin:  No rash, warm, good skin turgor   Eyes:  PERRL, EOMI, sclerae nonicteric   no periorbital edema   ENT:  Moist mucous membranes  Neck:  Trachea midline, symmetric  No JVD  Chest:  Clear to auscultation bilaterally without wheezes, crackles or rhonchi  CVS:  Regular rate and rhythm without murmur, gallop or rub  S1 and S2 identified and normal   No S3, S4    Abdomen:  Soft, nontender, nondistended without masses  Normal bowel sounds x 4 quadrants  No bruit  Extremities:  Warm, pink, motor and sensory intact and well perfused  No cyanosis, pallor  trace bilateral ankle edema  Neuro:  Awake, alert, oriented x3  Grossly intact  Psych:  Appropriate affect  Mentating appropriately    Normal mental status exam      Medications:    Current Facility-Administered Medications:     acetaminophen (TYLENOL) tablet 975 mg, 975 mg, Oral, Q8H [address], Ruiz Giordano PA-C, 975 mg at 07/27/22 0522    atorvastatin (LIPITOR) tablet 20 mg, 20 mg, Oral, Daily, ALYSIA Shannon-C, 20 mg at 07/27/22 0809    cyanocobalamin injection 1,000 mcg, 1,000 mcg, Intramuscular, Q30 Days, Ruiz Giordano PA-C, 1,000 mcg at 07/23/22 1744    Diclofenac Sodium (VOLTAREN) 1 % topical gel 2 g, 2 g, Topical, 4x Daily, ALYSIA Shannon-C, 2 g at 07/27/22 0817    diltiazem (CARDIZEM) tablet 120 mg, 120 mg, Oral, Q6H [address], Ruiz Giordano PA-C, 120 mg at 07/27/22 0522    docusate sodium (COLACE) capsule 100 mg, 100 mg, Oral, BID, ALYSIA Shannon-C, 100 mg at 07/27/22 0809    furosemide (LASIX) tablet 20 mg, 20 mg, Oral, BID, Ruiz Giordano PA-C, 20 mg at 07/26/22 1733    guaiFENesin (MUCINEX) 12 hr tablet 600 mg, 600 mg, Oral, Q12H [address], JULIAN McarthurNP, 600 mg at 07/27/22 0809    heparin (porcine) subcutaneous injection 5,000 Units, 5,000 Units, Subcutaneous, Q8H [address], ALYSIA Shannon-C, 5,000 Units at 07/27/22 0522    HYDROmorphone HCl (DILAUDID) injection 0 2 mg, 0 2 mg, Intravenous, Q4H PRN, Phuc Rodgers PA-C    levothyroxine tablet 50 mcg, 50 mcg, Oral, Daily, Ruiz Giordano PA-C, 50 mcg at 07/27/22 0522    lidocaine (LIDODERM) 5 % patch 2 patch, 2 patch, Topical, Daily, Ruiz Giordano PA-C, 2 patch at 07/27/22 0809    memantine (NAMENDA) tablet 10 mg, 10 mg, Oral, BID, Ruiz ALYSIA Giordano-C, 10 mg at 07/27/22 0808    methocarbamol (ROBAXIN) tablet 250 mg, 250 mg, Oral, Q6H [facility], Ruiz ALYSIA Giordano-C, 250 mg at 07/26/22 1733    naloxone (NARCAN) 0 04 mg/mL syringe 0 04 mg, 0 04 mg, Intravenous, Q1MIN PRN, Chary Ryder, PA-C    ondansetron (ZOFRAN) injection 4 mg, 4 mg, Intravenous, Q4H PRN, Chary Ryder, PA-C, 4 mg at 07/24/22 1636    oxyCODONE (ROXICODONE) IR tablet 2 5 mg, 2 5 mg, Oral, Q4H PRN, Chary Ryder, PA-C    oxyCODONE (ROXICODONE) IR tablet 5 mg, 5 mg, Oral, Q4H PRN, Chary Ryder, PA-C, 5 mg at 07/27/22 0002    pantoprazole (PROTONIX) EC tablet 40 mg, 40 mg, Oral, Daily, Ruiz Giordano PA-C, 40 mg at 07/27/22 0809    polyethylene glycol (MIRALAX) packet 17 g, 17 g, Oral, Daily PRN, Chary Ryder, PA-C, 17 g at 07/23/22 1744    senna (SENOKOT) tablet 17 2 mg, 2 tablet, Oral, Daily, ALYSIA Shannon-C, 17 2 mg at 07/27/22 0809    sodium chloride tablet 2 g, 2 g, Oral, TID With Meals, Gely Cathy, PA-C, 2 g at 07/27/22 0809    Laboratory Results:  Results from last 7 days   Lab Units 07/27/22  0841 07/27/22  0524 07/26/22  1449 07/26/22  0526 07/25/22  1423 07/25/22  0507 07/24/22  1755 07/24/22  0515 07/23/22  1205   WBC Thousand/uL  --  7 42  --   --   --   --   --  8 96 11 71*   HEMOGLOBIN g/dL  --  10 7*  --   --   --   --   --  10 7* 12 9   HEMATOCRIT %  --  32 2*  --   --   --   --   --  32 5* 38 3   PLATELETS Thousands/uL  --  271  --   --   --   --   --  292 286   SODIUM mmol/L 128* 127* 126* 125* 125* 127* 124* 127* 127*   POTASSIUM mmol/L 4 2 4 3 4 3 4 2 4 0 4 2 4 2 3 9 4 2   CHLORIDE mmol/L 93* 94* 92* 91* 92* 92* 88* 91* 89*   CO2 mmol/L 31 29 30 31 29 31 29 32 29   BUN mg/dL 9 10 12 14 13 12 13 11 7   CREATININE mg/dL 0 63 0 61 0 72 0 70 0 78 0 64 0 75 0 68 0 48*   CALCIUM mg/dL 8 9 8 8 8 5 8 7 8 4 8 6 8 9 8 7 9 2       I have personally reviewed the blood work as stated above and in my note    I have personally reviewed internal Medicine, co-consultants and previous nephrology notes

## 2022-07-27 NOTE — PLAN OF CARE
Problem: MOBILITY - ADULT  Goal: Maintain or return to baseline ADL function  Description: INTERVENTIONS:  -  Assess patient's ability to carry out ADLs; assess patient's baseline for ADL function and identify physical deficits which impact ability to perform ADLs (bathing, care of mouth/teeth, toileting, grooming, dressing, etc )  - Assess/evaluate cause of self-care deficits   - Assess range of motion  - Assess patient's mobility; develop plan if impaired  - Assess patient's need for assistive devices and provide as appropriate  - Encourage maximum independence but intervene and supervise when necessary  - Involve family in performance of ADLs  - Assess for home care needs following discharge   - Consider OT consult to assist with ADL evaluation and planning for discharge  - Provide patient education as appropriate  Outcome: Progressing  Goal: Maintains/Returns to pre admission functional level  Description: INTERVENTIONS:  - Perform BMAT or MOVE assessment daily    - Set and communicate daily mobility goal to care team and patient/family/caregiver  - Collaborate with rehabilitation services on mobility goals if consulted  - Perform Range of Motion 3 times a day  - Reposition patient every 2 hours    - Dangle patient 3 times a day  - Stand patient 3 times a day  - Ambulate patient 3 times a day  - Out of bed to chair 3 times a day   - Out of bed for meals 3 times a day  - Out of bed for toileting  - Record patient progress and toleration of activity level   Outcome: Progressing     Problem: PAIN - ADULT  Goal: Verbalizes/displays adequate comfort level or baseline comfort level  Description: Interventions:  - Encourage patient to monitor pain and request assistance  - Assess pain using appropriate pain scale  - Administer analgesics based on type and severity of pain and evaluate response  - Implement non-pharmacological measures as appropriate and evaluate response  - Consider cultural and social influences on pain and pain management  - Notify physician/advanced practitioner if interventions unsuccessful or patient reports new pain  Outcome: Progressing     Problem: INFECTION - ADULT  Goal: Absence or prevention of progression during hospitalization  Description: INTERVENTIONS:  - Assess and monitor for signs and symptoms of infection  - Monitor lab/diagnostic results  - Monitor all insertion sites, i e  indwelling lines, tubes, and drains  - Monitor endotracheal if appropriate and nasal secretions for changes in amount and color  - [place] appropriate cooling/warming therapies per order  - Administer medications as ordered  - Instruct and encourage patient and family to use good hand hygiene technique  - Identify and instruct in appropriate isolation precautions for identified infection/condition  Outcome: Progressing  Goal: Absence of fever/infection during neutropenic period  Description: INTERVENTIONS:  - Monitor WBC    Outcome: Progressing     Problem: SAFETY ADULT  Goal: Maintain or return to baseline ADL function  Description: INTERVENTIONS:  -  Assess patient's ability to carry out ADLs; assess patient's baseline for ADL function and identify physical deficits which impact ability to perform ADLs (bathing, care of mouth/teeth, toileting, grooming, dressing, etc )  - Assess/evaluate cause of self-care deficits   - Assess range of motion  - Assess patient's mobility; develop plan if impaired  - Assess patient's need for assistive devices and provide as appropriate  - Encourage maximum independence but intervene and supervise when necessary  - Involve family in performance of ADLs  - Assess for home care needs following discharge   - Consider OT consult to assist with ADL evaluation and planning for discharge  - Provide patient education as appropriate  Outcome: Progressing  Goal: Maintains/Returns to pre admission functional level  Description: INTERVENTIONS:  - Perform BMAT or MOVE assessment daily    - Set and communicate daily mobility goal to care team and patient/family/caregiver  - Collaborate with rehabilitation services on mobility goals if consulted  - Perform Range of Motion 3 times a day  - Reposition patient every 2 hours    - Dangle patient 3 times a day  - Stand patient 3 times a day  - Ambulate patient 3 times a day  - Out of bed to chair 3 times a day   - Out of bed for meals 3 times a day  - Out of bed for toileting  - Record patient progress and toleration of activity level   Outcome: Progressing  Goal: Patient will remain free of falls  Description: INTERVENTIONS:  - Educate patient/family on patient safety including physical limitations  - Instruct patient to call for assistance with activity   - Consult OT/PT to assist with strengthening/mobility   - Keep Call bell within reach  - Keep bed low and locked with side rails adjusted as appropriate  - Keep care items and personal belongings within reach  - Initiate and maintain comfort rounds  - Make Fall Risk Sign visible to staff  - Offer Toileting every 2 Hours, in advance of need  - Initiate/Maintain alarm  - Obtain necessary fall risk management equipment:   - Apply yellow socks and bracelet for high fall risk patients  - Consider moving patient to room near nurses station  Outcome: Progressing     Problem: DISCHARGE PLANNING  Goal: Discharge to home or other facility with appropriate resources  Description: INTERVENTIONS:  - Identify barriers to discharge w/patient and caregiver  - Arrange for needed discharge resources and transportation as appropriate  - Identify discharge learning needs (meds, wound care, etc )  - Arrange for interpretive services to assist at discharge as needed  - Refer to Case Management Department for coordinating discharge planning if the patient needs post-hospital services based on physician/advanced practitioner order or complex needs related to functional status, cognitive ability, or social support system  Outcome: Progressing     Problem: Potential for Falls  Goal: Patient will remain free of falls  Description: INTERVENTIONS:  - Educate patient/family on patient safety including physical limitations  - Instruct patient to call for assistance with activity   - Consult OT/PT to assist with strengthening/mobility   - Keep Call bell within reach  - Keep bed low and locked with side rails adjusted as appropriate  - Keep care items and personal belongings within reach  - Initiate and maintain comfort rounds  - Make Fall Risk Sign visible to staff  - Offer Toileting every 2 Hours, in advance of need  - Initiate/Maintain alarm  - Obtain necessary fall risk management equipment:   - Apply yellow socks and bracelet for high fall risk patients  - Consider moving patient to room near nurses station  Outcome: Progressing     Problem: Prexisting or High Potential for Compromised Skin Integrity  Goal: Skin integrity is maintained or improved  Description: INTERVENTIONS:  - Identify patients at risk for skin breakdown  - Assess and monitor skin integrity  - Assess and monitor nutrition and hydration status  - Monitor labs   - Assess for incontinence   - Turn and reposition patient  - Assist with mobility/ambulation  - Relieve pressure over bony prominences  - Avoid friction and shearing  - Provide appropriate hygiene as needed including keeping skin clean and dry  - Evaluate need for skin moisturizer/barrier cream  - Collaborate with interdisciplinary team   - Patient/family teaching  - Consider wound care consult   Outcome: Progressing     Problem: Nutrition/Hydration-ADULT  Goal: Nutrient/Hydration intake appropriate for improving, restoring or maintaining nutritional needs  Description: Monitor and assess patient's nutrition/hydration status for malnutrition  Collaborate with interdisciplinary team and initiate plan and interventions as ordered  Monitor patient's weight and dietary intake as ordered or per policy   Utilize nutrition screening tool and intervene as necessary  Determine patient's food preferences and provide high-protein, high-caloric foods as appropriate       INTERVENTIONS:  - Monitor oral intake, urinary output, labs, and treatment plans  - Assess nutrition and hydration status and recommend course of action  - Evaluate amount of meals eaten  - Assist patient with eating if necessary   - Allow adequate time for meals  - Recommend/ encourage appropriate diets, oral nutritional supplements, and vitamin/mineral supplements  - Order, calculate, and assess calorie counts as needed  - Recommend, monitor, and adjust tube feedings and TPN/PPN based on assessed needs  - Assess need for intravenous fluids  - Provide specific nutrition/hydration education as appropriate  - Include patient/family/caregiver in decisions related to nutrition  Outcome: Progressing

## 2022-07-27 NOTE — ASSESSMENT & PLAN NOTE
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

## 2022-07-27 NOTE — PHYSICAL THERAPY NOTE
PHYSICAL THERAPY NOTE          Patient Name: Blanquita RICO Date: 7/27/2022 07/27/22 1510   PT Last Visit   PT Visit Date 07/27/22   Note Type   Note Type Treatment   Pain Assessment   Pain Assessment Tool 0-10   Pain Score No Pain   Pain Location/Orientation Orientation: Left; Location: Shoulder   Effect of Pain on Daily Activities limits activity tolerance   Patient's Stated Pain Goal No pain   Hospital Pain Intervention(s) Repositioned; Ambulation/increased activity; Emotional support; Rest   Multiple Pain Sites No   Pain Rating: FLACC (Rest) - Face 0   Pain Rating: FLACC (Rest) - Legs 0   Pain Rating: FLACC (Rest) - Activity 0   Pain Rating: FLACC (Rest) - Cry 0   Pain Rating: FLACC (Rest) - Consolability 0   Score: FLACC (Rest) 0   Pain Rating: FLACC (Activity) - Face 1   Pain Rating: FLACC (Activity) - Legs 0   Pain Rating: FLACC (Activity) - Activity 0   Pain Rating: FLACC (Activity) - Cry 1   Pain Rating: FLACC (Activity) - Consolability 1   Score: FLACC (Activity) 3   Restrictions/Precautions   Weight Bearing Precautions Per Order No   Braces or Orthoses   (back pack TLSO worn for compfort only family defers at this time)   Other Precautions Cognitive; Chair Alarm; Bed Alarm;O2;Fall Risk   General   Chart Reviewed Yes   Response to Previous Treatment Patient with no complaints from previous session  Family/Caregiver Present No   Cognition   Overall Cognitive Status Impaired   Arousal/Participation Alert; Responsive; Cooperative   Attention Attends with cues to redirect   Orientation Level Oriented X4   Memory Decreased short term memory;Decreased recall of precautions   Following Commands Follows one step commands with increased time or repetition   Comments pt was pleasant and cooperative throughout tx session   Subjective   Subjective pt was agreeable to participate in Pt intervention   Bed Mobility   Rolling R Unable to assess   Rolling L Unable to assess   Supine to Sit Unable to assess   Sit to Supine 3  Moderate assistance   Additional items Assist x 2;HOB elevated; Bedrails; Increased time required;Verbal cues;LE management; Other  (trunk management)   Additional Comments pt returned to supine post tx session and required mod Ax2 to reposition towrads HOB   Transfers   Sit to Stand 3  Moderate assistance   Additional items Assist x 1; Increased time required;Verbal cues  (w/ RW)   Stand to Sit 3  Moderate assistance   Additional items Assist x 1; Increased time required;Armrests; Verbal cues  (w/ RW)   Stand pivot 3  Moderate assistance   Additional items Assist x 1; Armrests; Increased time required;Verbal cues  (w/ RW)   Additional Comments pt cotninues to require RW and VC's to complete all functional transfers safely in todasy tx session   Ambulation/Elevation   Gait pattern Improper Weight shift;Decreased foot clearance; Wide GEORGETTE; Foward flexed; Short stride; Excessively slow;Decreased heel strike;Decreased toe off   Gait Assistance 4  Minimal assist   Additional items Assist x 1;Verbal cues   Assistive Device Rolling walker   Distance 20'x1 RW   Balance   Static Sitting Fair -   Dynamic Sitting Poor +   Static Standing Poor +   Dynamic Standing Poor +   Ambulatory Poor +   Endurance Deficit   Endurance Deficit Yes   Endurance Deficit Description limited activity tolerance as pt continues to demonstrated the inability to ambulate house hold distances   Activity Tolerance   Activity Tolerance Patient limited by fatigue   Nurse Made Aware Spoke to RN   Exercises   Hip Abduction Sitting;Bilateral;10 reps;AROM   Hip Adduction Sitting;10 reps;AROM; Bilateral  (pillow squeezes)   Knee AROM Long Arc Quad Sitting;10 reps;AROM; Bilateral   Ankle Pumps Sitting;20 reps;AROM; Bilateral   Marching Sitting;10 reps;AROM; Bilateral   Assessment   Prognosis Fair   Problem List Decreased strength;Decreased range of motion;Decreased endurance; Impaired balance;Decreased mobility; Decreased safety awareness;Orthopedic restrictions;Pain   Assessment pt began tx session seated in the recliner and was agreeable to participate in PT intervention  pt cotninues to remain consistant with all fucntional transfers to and from RW with mod Ax1 and VC's for hand placement while ascending to RW and descending back to EOB  pt continues to be functioning below her baseline level and remains limited with functional mobility, activity tolerance and ambulation distance  pt continues to demonstrate the inability to ambulate house hold distances safely and continues to be at risk for falls and injuries  pt would benefit from continued skilled PT intervention in order to maximize pt functional independence and safety with all OOB mobility as appropriate  post tx pt in bed with call bell and all pt needs met   Goals   Patient Goals to get some rest   STG Expiration Date 08/03/22   PT Treatment Day 2   Plan   Treatment/Interventions Functional transfer training;LE strengthening/ROM; Therapeutic exercise; Endurance training;Patient/family training;Equipment eval/education; Bed mobility;Gait training;Spoke to nursing   Progress Slow progress, decreased activity tolerance   PT Frequency 4-6x/wk   Recommendation   PT Discharge Recommendation Post acute rehabilitation services  (vs return to facility with PT services)   Equipment Recommended Neisha Tony 435   Turning in Bed Without Bedrails 2   Lying on Back to Sitting on Edge of Flat Bed 2   Moving Bed to Chair 3   Standing Up From Chair 2   Walk in Room 1   Climb 3-5 Stairs 1   Basic Mobility Inpatient Raw Score 11   Basic Mobility Standardized Score 30 25   Highest Level Of Mobility   JH-HLM Goal 4: Move to chair/commode   JH-HLM Achieved 6: Walk 10 steps or more   Education   Education Provided Assistive device; Mobility training   Patient Reinforcement needed   End of Consult   Patient Position at End of Consult Supine;Bed/Chair alarm activated; All needs within reach   The patient's AM-PAC Basic Mobility Inpatient Short Form Raw Score is 11  A Raw score of less than or equal to 16 suggests the patient may benefit from discharge to post-acute rehabilitation services  Please also refer to the recommendation of the Physical Therapist for safe discharge planning       Luis Manuel Grissom, PTA

## 2022-07-27 NOTE — PLAN OF CARE
Problem: PHYSICAL THERAPY ADULT  Goal: Performs mobility at highest level of function for planned discharge setting  See evaluation for individualized goals  Description: Treatment/Interventions: Functional transfer training, LE strengthening/ROM, Therapeutic exercise, Endurance training, Patient/family training, Equipment eval/education, Bed mobility, Gait training  Equipment Recommended: Mara Silva       See flowsheet documentation for full assessment, interventions and recommendations  Outcome: Progressing  Note: Prognosis: Fair  Problem List: Decreased strength, Decreased range of motion, Decreased endurance, Impaired balance, Decreased mobility, Decreased safety awareness, Orthopedic restrictions, Pain  Assessment: pt began tx session seated in the recliner and was agreeable to participate in PT intervention  pt cotninues to remain consistant with all fucntional transfers to and from RW with mod Ax1 and VC's for hand placement while ascending to RW and descending back to EOB  pt continues to be functioning below her baseline level and remains limited with functional mobility, activity tolerance and ambulation distance  pt continues to demonstrate the inability to ambulate house hold distances safely and continues to be at risk for falls and injuries  pt would benefit from continued skilled PT intervention in order to maximize pt functional independence and safety with all OOB mobility as appropriate  post tx pt in bed with call bell and all pt needs met        PT Discharge Recommendation: Post acute rehabilitation services (vs return to facility with PT services)    See flowsheet documentation for full assessment

## 2022-07-27 NOTE — PROGRESS NOTES
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-The patient's sodium basleine appears to be 135-142  It trended down to 125 on 07/24/22, but it is at 128 today  Serum osmolality is 271, urine osmolality is 295, and urine sodium is 30  There were no overt signs of malignancy on multiple CTs without contrast, but there was diffuse honeycombing throughout the lung bilaterally with bronchiectatic and fibrotic changes  Continue salt tablets 2 gm TID  Continue fluid restriction of 1 2L/day  Avoid the use of NSAIDs  Continue checking BMP every 8 hours  2  Chronic kidney disease stage 2: The patient's GFR is 79  Patient has solitary right kidney post left nephrectomy in September 2021  Baseline creatinine is around 0 6-0 9, and today it is 0 63  Renal function is currently stable, so continue to follow-up with outpatient nephrologist  Avoid nephrotoxic medications, hypotension, and IV contrast if possible  3  Primary hypertension: the patient's blood pressure has been fluctuating with periods of relative hypotension, but it is acceptable  Home amlodipine was discontinued per nephrology due to patient already being on diltiazem  Patient was also changed from Lasix to torsemide  Continue to monitor blood pressure, and outpatient follow-up will be needed  4  Closed fracture of multiple ribs of left side- Multiple left-sided rib fractures (5 &7) present on admission  Continue rib fracture protocol  Continue to encourage use of incentive spirometer, as she is getting to 750 mL currently  Continue multimodal analgesic regimen  Supplemental oxygen via nasal cannula as needed to maintain saturations greater than or equal to 94%  Repeat chest x-ray on 07/24/2022 is stable   Outpatient follow-up in the trauma clinic for re-evaluation in approximately 2 weeks     5  Closed wedge compression fracture of T3 vertebra-Mild T3 compression fracture on admission with progression when compared to prior imaging  Non-operative management was recommended  Initially, a TLSO brace was ordered, but neurosurgery decided no bracing at this time  Monitor neurovascular exam and utilize multimodal analgesic regimen as needed  Outpatient follow-up with neurosurgery for re-evaluation  6  Hypothyroidism- Continue home medication regimen, outpatient follow-up per routine    7  Chronic congestive heart failure- Continue home medication regimen, monitor volume status  Outpatient follow-up per routine    Subjective:   Patient was being seen as a follow-up  Upon walking in, she seemed comfortable and alert  She states that she was feeling no pain today, but is feeling nauseous with some stomach pain  She had a bowel movement yesterday, but has not had one yet today  She claims she slept good through the night and has been tolerating her diet  She has no other acute complaints  Review of Systems   Constitutional: Negative  HENT: Negative  Eyes: Negative  Respiratory: Negative  Cardiovascular: Negative  Gastrointestinal: Positive for abdominal pain and nausea  Endocrine: Negative  Genitourinary: Negative  Musculoskeletal: Negative  Skin: Negative  Allergic/Immunologic: Negative  Neurological: Negative  Hematological: Negative  Psychiatric/Behavioral: Negative  Objective:     Vitals: Blood pressure 125/56, pulse 96, temperature 97 8 °F (36 6 °C), resp  rate 16, height 5' 2" (1 575 m), SpO2 95 %  ,Body mass index is 34 31 kg/m²  Intake/Output Summary (Last 24 hours) at 7/27/2022 1504  Last data filed at 7/27/2022 0529  Gross per 24 hour   Intake 300 ml   Output 1050 ml   Net -750 ml       Current Medications: Reviewed    Physical Exam:   Physical Exam  Constitutional:       Appearance: Normal appearance  HENT:      Head: Normocephalic  Cardiovascular:      Rate and Rhythm: Normal rate and regular rhythm  Pulses: Normal pulses  Heart sounds: Normal heart sounds  Pulmonary:      Effort: Pulmonary effort is normal       Breath sounds: Normal breath sounds  Abdominal:      General: Abdomen is flat  Bowel sounds are normal       Palpations: Abdomen is soft  Skin:     General: Skin is warm and dry  Neurological:      General: No focal deficit present  Mental Status: She is alert  Psychiatric:         Mood and Affect: Mood normal          Behavior: Behavior normal           Invasive Devices  Report    Peripheral Intravenous Line  Duration           Peripheral IV 07/23/22 Left Antecubital 4 days          Drain  Duration           External Urinary Catheter 4 days                Lab, Imaging and other studies: I have personally reviewed pertinent reports

## 2022-07-27 NOTE — PROGRESS NOTES
[facility]  Progress Note - Laron Oropeza 1933, 80 y o  female MRN: [id number]  Unit/Bed#: S -01 Encounter: [id number]  Primary Care Provider: Kelly Sherwood MD   Date and time admitted to hospital: 7/23/2022 10:43 AM    Fall  Assessment & Plan  - Status post fall with the below noted injuries  - Fall precautions  - Geriatric Medicine consultation for evaluation, medication review and recommendations   - PT and OT evaluation and treatment as indicated  - Case Management consultation for disposition planning  * Closed fracture of multiple ribs of left side  Assessment & Plan  - Multiple left-sided rib fractures (5 & 7), present on admission   - Continue rib fracture protocol   - Continue to encourage incentive spirometer use and adequate pulmonary hygiene  Currently pulling 500-750 mL on I S  Flutter valve also given to patient  - PIC score is 8   - Continue multimodal analgesic regimen  Appreciate APS evaluation and recommendations   - Supplemental oxygen via nasal cannula as needed to maintain saturations greater than or equal to 94%  - Repeat chest x-ray on 7/24/2022 stable  - PT and OT evaluation and treatment as indicated  - Outpatient follow-up in the trauma clinic for re-evaluation in approximately 2 weeks  Closed wedge compression fracture of T3 vertebra (HCC)  Assessment & Plan  - Mild T3 compression fracture, present on admission, with progression when compared to prior imaging   - Appreciate Neurosurgery evaluation and recommendations  Non-operative management recommended  - Bracing: Initially a TLSO brace was ordered  After discussion with Neurosurgery, bracing for this fracture will be difficult due to level of fracture  Would likely need cervical collar and TLSO brace or a  brace, but neither may cover the fracture level well per Neurosurgery  No bracing at this time  - Monitor neurovascular exam   - Multimodal analgesic regimen as needed    - PT and OT evaluation and treatment as indicated  - Outpatient follow up with Neurosurgery for re-evaluation  Hyponatremia  Assessment & Plan  - Significant hyponatremia, present on presentation, with sodium of 127   - Sodium down trended as low as 124 on 07/24/2022   127, today  Uptrending since initiation of 2 g salt tabs TID  - nephrology consulted and note appreciated  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  - continue Salt tabs 2 g TID  - Continue to monitor sodium with serial BMP   - Outpatient follow-up with PCP  Hypothyroid  Assessment & Plan  - Patient with chronic history of hypothyroidism   - Continue home medication regimen   - Outpatient follow-up per routine  Chronic congestive heart failure (HCC)  Assessment & Plan  Wt Readings from Last 3 Encounters:   07/14/22 85 1 kg (187 lb 9 8 oz)     - Patient with chronic history of CHF without evidence of acute exacerbation   - Continue home medication regimen  - Monitor volume status   - Outpatient follow-up per routine  CKD (chronic kidney disease) stage 3, GFR 30-59 ml/min [facility])  Assessment & Plan  Lab Results   Component Value Date    EGFR 79 07/27/2022    EGFR 80 07/27/2022    EGFR 74 07/26/2022    CREATININE 0 63 07/27/2022    CREATININE 0 61 07/27/2022    CREATININE 0 72 07/26/2022        - Patient with history of CKD stage 3 and baseline creatinine around 0 9  Patient follows with [facility] as outpatient  - Patient also known to 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   - No evidence of acute kidney injury at this time  - Monitor volume status and renal function with BMP  - Avoid nephrotoxic medications and hypotension   - Outpatient follow-up per routine  HTN (hypertension)  Assessment & Plan  - Chronic history of hypertension   - home amlodipine was discontinued per Nephrology due to patient already being on a calcium channel blocker, diltiazem    - patient was also changed from Lasix to torsemide, per Nephrology  - continue to monitor I&O  - continue to monitor blood pressure  - Outpatient follow-up per routine  Disposition:  Pending placement    SUBJECTIVE:  Chief Complaint:  I am doing much better than yesterday    Subjective:  Patient describes doing well today  She states that her pain has been under control  She denies any feelings of shortness of breath or difficulty breathing  She confirms she continues to use her incentive spirometer pulling 500-750 mL  She denies any other complaints  OBJECTIVE:   Vitals:   Temp:  [97 6 °F (36 4 °C)-98 8 °F (37 1 °C)] 98 8 °F (37 1 °C)  HR:  [68-93] 88  Resp:  [16-18] 18  BP: ()/(43-73) 137/73    Intake/Output:  I/O       07/25 0701 07/26 0700 07/26 0701 07/27 0700 07/27 0701 07/28 0700    P  O  960 660     IV Piggyback       Total Intake 960 660     Urine 470 1050     Total Output 470 1050     Net +490 -390            Unmeasured Urine Occurrence 1 x           Nutrition: Diet Regular; Regular House; Fluid Restriction 1200 ML  GI Proph/Bowel Reg:  Colace, senna  VTE Prophylaxis:Sequential compression device (Venodyne)  and Heparin     Physical Exam:   GENERAL APPEARANCE:  No acute distress  NEURO:  GCS is 15  Light touch sensation intact throughout  HEENT:  Normocephalic, atraumatic  Neck supple  CV: RRR, +2 radial dorsalis pedis pulses, bilaterally  LUNGS:  Clear to auscultation, bilaterally  No orthopnea  No tachypnea  Patient is on 1 L nasal cannula saturating 96%  GI:  Abdomen is soft nontender  :  Pelvis stable  MSK:  No deformities  SKIN:  Warm, dry      Invasive Devices  Report    Peripheral Intravenous Line  Duration           Peripheral IV 07/23/22 Left Antecubital 4 days          Drain  Duration           External Urinary Catheter 4 days                 PIC Score  PIC Pain Score: 3 (7/27/2022  9:00 AM)  PIC Incentive Spirometry Score: Goal to alert volume (7/27/2022  8:00 AM)  PIC Cough Description: Absent (7/27/2022  8:00 AM)  PIC Total Score: 7 (7/27/2022  8:00 AM)       If the Total PIC Score </=5, did you consult APS and evaluate patient for further intervention?: yes      Pain:    Incentive Spirometry  Cough  3 = Controlled  4 = Above goal volume 3 = Strong  2 = Moderate  3 = Goal to alert volume 2 = Weak  1 = Severe  2 = Below alert volume 1 = Absent     1 = Unable to perform IS         Lab Results:   Results: I have personally reviewed all pertinent laboratory/tests results, BMP/CMP:   Lab Results   Component Value Date    SODIUM 128 (L) 07/27/2022    K 4 2 07/27/2022    CL 93 (L) 07/27/2022    CO2 31 07/27/2022    BUN 9 07/27/2022    CREATININE 0 63 07/27/2022    CALCIUM 8 9 07/27/2022    EGFR 79 07/27/2022    and CBC:   Lab Results   Component Value Date    WBC 7 42 07/27/2022    HGB 10 7 (L) 07/27/2022    HCT 32 2 (L) 07/27/2022    MCV 85 07/27/2022     07/27/2022    MCH 28 2 07/27/2022    MCHC 33 2 07/27/2022    RDW 13 9 07/27/2022    MPV 8 7 (L) 07/27/2022     Imaging/EKG Studies: I have personally reviewed pertinent reports       Other Studies: none

## 2022-07-27 NOTE — CASE MANAGEMENT
Case Management Progress Note    Patient name Koko Stewart  Location S /S -01 MRN [id number]  : 1933 Date 2022       LOS (days): 4  Geometric Mean LOS (GMLOS) (days): 3 10  Days to GMLOS:-0 8        OBJECTIVE:        Current admission status: Inpatient  Preferred Pharmacy: No Pharmacies Listed  Primary Care Provider: Kelsie Catalan MD    Primary Insurance: [facility]  Secondary Insurance:     PROGRESS NOTE:    Cm notified by CM support staff, that patient's insurance is stating that her plan is no longer affective  Cm verified the ID number on the card that patient's son had a copy of and noted that on card effective date was 21  Cm then contacted provider insurance line to confirm eligibility  Per insurance, [employer] insurance plan active since 2022  Justin then spoke a representative Al Zelaya who confirmed the patient has active insurance  Al Zelaya then transferred CM to authorization rep who confirmed that CM needed to contact [facility] since they do the authorizations for skilled nursing transfer  The number for authorization department is [phone]  Cm spoke with Amy Johns at [phone] who confirmed that patient will need to contact member services since address and name seem to inaccurate  Cm informed patient's son about issue after call  Cm then spent some time communicating to Amy Johns the necessary information to start authorization  Cm informed that clinical information needed to be faxed to [phone]  With intake reference number as [id number]  Cm will send clinical information over to complete insurance auth request      Cm received updated therapy notes to complete insurance authorization  Cm sent information to insurance company  Cm awaiting response

## 2022-07-27 NOTE — ASSESSMENT & PLAN NOTE
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

## 2022-07-27 NOTE — CASE MANAGEMENT
Case Management Discharge Planning Note    Patient name Nola Sánchez  Location S /S -01 MRN [id number]  : 1933 Date 2022       Current Admission Date: 2022  Current Admission Diagnosis:Closed fracture of multiple ribs of left side   Patient Active Problem List    Diagnosis Date Noted    Fall 2022    Closed fracture of multiple ribs of left side 2022    Closed wedge compression fracture of T3 vertebra ([facility] [address] ) 2022    HTN (hypertension) 2022    CKD (chronic kidney disease) stage 3, GFR 30-59 ml/min ([facility]) 2022    Chronic congestive heart failure ([facility] [address] ) 2022    Hypothyroid 2022    Hyponatremia 2022      LOS (days): 4  Geometric Mean LOS (GMLOS) (days): 3 10  Days to GMLOS:-0 7     OBJECTIVE:  Risk of Unplanned Readmission Score: 13 32         Current admission status: Inpatient   Preferred Pharmacy: No Pharmacies Listed  Primary Care Provider: Aliyah Morales MD    Primary Insurance: [facility]  Secondary Insurance:     [address] Number: On 22 submitted request with clinicals to [facility] for patient to be tranferred to [facility]  Received fax from [facility] Stating "The member does have active coverage"   to be notified

## 2022-07-27 NOTE — PLAN OF CARE

## 2022-07-27 NOTE — ASSESSMENT & PLAN NOTE
- Chronic history of hypertension   - home amlodipine was discontinued per Nephrology due to patient already being on a calcium channel blocker, diltiazem  - patient was also changed from Lasix to torsemide, per Nephrology  - continue to monitor I&O  - continue to monitor blood pressure  - Outpatient follow-up per routine

## 2022-07-27 NOTE — TELEPHONE ENCOUNTER
8/2/22:  SPOKE TO [address] / IMAGING      8/1/22  DISCHARGED TO Britt García [facility] #[phone]  LMOM FOR [facility]) WITH APPT DETAILS  REQUESTED A CALL BACK TO CONFIRM  7/29/22: INPATIENT    7/28/22: INPATIENT    7/27/22: INPATIENT    2 WK HFU W/ AP      8/11/22 / 9:00 / [facility]    IMAGING:  TANI Brown Like; Aiden Salmeron  Hi ladies,     Can we please schedule appointment for this lady in 2 weeks with AP? I have ordered thoracic x-rays  Thanks

## 2022-07-28 LAB
ANION GAP SERPL CALCULATED.3IONS-SCNC: 5 MMOL/L (ref 4–13)
BUN SERPL-MCNC: 12 MG/DL (ref 5–25)
CALCIUM SERPL-MCNC: 9.1 MG/DL (ref 8.4–10.2)
CHLORIDE SERPL-SCNC: 95 MMOL/L (ref 96–108)
CO2 SERPL-SCNC: 34 MMOL/L (ref 21–32)
CREAT SERPL-MCNC: 0.58 MG/DL (ref 0.6–1.3)
GFR SERPL CREATININE-BSD FRML MDRD: 81 ML/MIN/1.73SQ M
GLUCOSE SERPL-MCNC: 93 MG/DL (ref 65–140)
POTASSIUM SERPL-SCNC: 3.8 MMOL/L (ref 3.5–5.3)
SODIUM SERPL-SCNC: 134 MMOL/L (ref 135–147)

## 2022-07-28 PROCEDURE — 99232 SBSQ HOSP IP/OBS MODERATE 35: CPT | Performed by: INTERNAL MEDICINE

## 2022-07-28 PROCEDURE — 80048 BASIC METABOLIC PNL TOTAL CA: CPT | Performed by: INTERNAL MEDICINE

## 2022-07-28 PROCEDURE — 99232 SBSQ HOSP IP/OBS MODERATE 35: CPT | Performed by: SURGERY

## 2022-07-28 RX ADMIN — MEMANTINE 10 MG: 10 TABLET ORAL at 17:12

## 2022-07-28 RX ADMIN — SODIUM CHLORIDE 2 G: 1 TABLET ORAL at 17:12

## 2022-07-28 RX ADMIN — ACETAMINOPHEN 975 MG: 325 TABLET ORAL at 06:05

## 2022-07-28 RX ADMIN — METHOCARBAMOL 250 MG: 500 TABLET ORAL at 17:12

## 2022-07-28 RX ADMIN — DICLOFENAC SODIUM TOPICAL GEL, 1%, 2 G: 10 GEL TOPICAL at 08:03

## 2022-07-28 RX ADMIN — TORSEMIDE 20 MG: 20 TABLET ORAL at 08:01

## 2022-07-28 RX ADMIN — SODIUM CHLORIDE 2 G: 1 TABLET ORAL at 08:02

## 2022-07-28 RX ADMIN — METHOCARBAMOL 250 MG: 500 TABLET ORAL at 12:27

## 2022-07-28 RX ADMIN — ACETAMINOPHEN 975 MG: 325 TABLET ORAL at 13:27

## 2022-07-28 RX ADMIN — MEMANTINE 10 MG: 10 TABLET ORAL at 08:02

## 2022-07-28 RX ADMIN — GUAIFENESIN 600 MG: 600 TABLET ORAL at 21:10

## 2022-07-28 RX ADMIN — HEPARIN SODIUM 5000 UNITS: 5000 INJECTION INTRAVENOUS; SUBCUTANEOUS at 06:06

## 2022-07-28 RX ADMIN — SENNOSIDES 17.2 MG: 8.6 TABLET, FILM COATED ORAL at 08:02

## 2022-07-28 RX ADMIN — DOCUSATE SODIUM 100 MG: 100 CAPSULE, LIQUID FILLED ORAL at 17:12

## 2022-07-28 RX ADMIN — OXYCODONE HYDROCHLORIDE 2.5 MG: 5 TABLET ORAL at 15:46

## 2022-07-28 RX ADMIN — HEPARIN SODIUM 5000 UNITS: 5000 INJECTION INTRAVENOUS; SUBCUTANEOUS at 21:10

## 2022-07-28 RX ADMIN — METHOCARBAMOL 250 MG: 500 TABLET ORAL at 06:05

## 2022-07-28 RX ADMIN — LEVOTHYROXINE SODIUM 50 MCG: 50 TABLET ORAL at 06:06

## 2022-07-28 RX ADMIN — ATORVASTATIN CALCIUM 20 MG: 20 TABLET, FILM COATED ORAL at 08:02

## 2022-07-28 RX ADMIN — SODIUM CHLORIDE 2 G: 1 TABLET ORAL at 12:27

## 2022-07-28 RX ADMIN — DILTIAZEM HYDROCHLORIDE 120 MG: 60 TABLET, FILM COATED ORAL at 06:05

## 2022-07-28 RX ADMIN — DICLOFENAC SODIUM TOPICAL GEL, 1%, 2 G: 10 GEL TOPICAL at 21:11

## 2022-07-28 RX ADMIN — DOCUSATE SODIUM 100 MG: 100 CAPSULE, LIQUID FILLED ORAL at 08:02

## 2022-07-28 RX ADMIN — PANTOPRAZOLE SODIUM 40 MG: 40 TABLET, DELAYED RELEASE ORAL at 08:01

## 2022-07-28 RX ADMIN — HEPARIN SODIUM 5000 UNITS: 5000 INJECTION INTRAVENOUS; SUBCUTANEOUS at 13:27

## 2022-07-28 RX ADMIN — LIDOCAINE 5% 2 PATCH: 700 PATCH TOPICAL at 08:01

## 2022-07-28 RX ADMIN — DICLOFENAC SODIUM TOPICAL GEL, 1%, 2 G: 10 GEL TOPICAL at 17:16

## 2022-07-28 RX ADMIN — GUAIFENESIN 600 MG: 600 TABLET ORAL at 08:02

## 2022-07-28 RX ADMIN — ACETAMINOPHEN 975 MG: 325 TABLET ORAL at 21:10

## 2022-07-28 RX ADMIN — DICLOFENAC SODIUM TOPICAL GEL, 1%, 2 G: 10 GEL TOPICAL at 12:28

## 2022-07-28 NOTE — CASE MANAGEMENT
Case Management Progress Note    Patient name Ksenia Finley  Location S /S -01 MRN [id number]  : 1933 Date 2022       LOS (days): 5  Geometric Mean LOS (GMLOS) (days): 3 10  Days to GMLOS:-1 9        OBJECTIVE:        Current admission status: Inpatient  Preferred Pharmacy: No Pharmacies Listed  Primary Care Provider: Toney Garces MD    Primary Insurance: [facility]  Secondary Insurance:     PROGRESS NOTE:    Cm working on [employer]  Cm called and confirmed that patient was approved for SNF at [facility]  However, CM informed by  that facility is out of network  Patient has a [place] based insurance that patient's son is working on changing to a [place]  Cm informed that insurance company aware of the change, but still provided authorization number for patient  However, when Cm contacted [facility] to confirm that authorization was approved, Cm informed by the facility liaison that since patient is out of network and the facility supposedly does not have a contact for out of network benefits, patient can not admit  Cm inquired to facility how this was missed when referral was initial sent, Cm received no response but a repeat of patient could not admit  Cm then contacted patient's son Vj Mei to inform him about conversation with [facility]  Cm informed son that Cm will reach out to insurance company to see if there are any in network facilities in [place] for a Time Krishnan  At the request of the son, Cm will send updated therapy notes to assisted living to see if they can accept patient [facility]  Cm faxed clinical information over to see if patient is appropriate for return  Cm also sent updated information to rehab facilities  Cm awaiting confirmation that [facility] and [employer] [facility] are in network with [facility]  In the mean time, Izabella has agreed to review and provide determination

## 2022-07-28 NOTE — PROGRESS NOTES
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  - continue Salt tabs 2 g TID  - Continue to monitor sodium with serial BMP,   - Outpatient follow-up with PCP  Hypothyroid  Assessment & Plan  - Patient with chronic history of hypothyroidism   - Continue home medication regimen   - Outpatient follow-up per routine  Chronic congestive heart failure (HCC)  Assessment & Plan  Wt Readings from Last 3 Encounters:   07/28/22 85 7 kg (188 lb 15 oz)   07/14/22 85 1 kg (187 lb 9 8 oz)     - Patient with chronic history of CHF without evidence of acute exacerbation   - Continue home medication regimen  - Monitor volume status   - Outpatient follow-up per routine  CKD (chronic kidney disease) stage 3, GFR 30-59 ml/min 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   - No evidence of acute kidney injury at this time  - Monitor volume status and renal function with BMP  - Avoid nephrotoxic medications and hypotension   - Outpatient follow-up per routine      HTN (hypertension)  Assessment & Plan  - Chronic history of hypertension   - home amlodipine was discontinued per Nephrology due to patient already being on a calcium channel blocker, diltiazem  - patient was also changed from Lasix to torsemide, per Nephrology  - continue to monitor I&O  - continue to monitor blood pressure  - Outpatient follow-up per routine  Closed wedge compression fracture of T3 vertebra (HCC)  Assessment & Plan  - Mild T3 compression fracture, present on admission, with progression when compared to prior imaging   - Appreciate Neurosurgery evaluation and recommendations  Non-operative management recommended  - Bracing: Initially a TLSO brace was ordered  After discussion with Neurosurgery, bracing for this fracture will be difficult due to level of fracture  Would likely need cervical collar and TLSO brace or a  brace, but neither may cover the fracture level well per Neurosurgery  No bracing at this time  - Monitor neurovascular exam   - Multimodal analgesic regimen as needed  - PT and OT evaluation and treatment as indicated  - Outpatient follow up with Neurosurgery for re-evaluation  Fall  Assessment & Plan  - Status post fall with the below noted injuries  - Fall precautions  - Geriatric Medicine consultation for evaluation, medication review and recommendations   - PT and OT evaluation and treatment as indicated  - Case Management consultation for disposition planning  * Closed fracture of multiple ribs of left side  Assessment & Plan  - Multiple left-sided rib fractures (5 & 7), present on admission   - Continue rib fracture protocol   - Continue to encourage incentive spirometer use and adequate pulmonary hygiene  Currently pulling 500-750 mL on I S  Flutter valve also given to patient  - PIC score is 8   - Continue multimodal analgesic regimen  Appreciate APS evaluation and recommendations   - Supplemental oxygen via nasal cannula as needed to maintain saturations greater than or equal to 94%    - Repeat chest x-ray on 7/24/2022 stable  - PT and OT evaluation and treatment as indicated  - Outpatient follow-up in the trauma clinic for re-evaluation in approximately 2 weeks  Disposition: rehab    SUBJECTIVE:  Chief Complaint: rib pain    Subjective: "Hello"    OBJECTIVE:   Vitals:   Temp:  [97 8 °F (36 6 °C)-98 8 °F (37 1 °C)] 98 °F (36 7 °C)  HR:  [] 95  Resp:  [16-17] 16  BP: ()/(49-87) 154/87    Intake/Output:  I/O       07/26 0701 07/27 0700 07/27 0701 07/28 0700 07/28 0701 07/29 0700    P  O  660 240     Total Intake(mL/kg) 660 240 (2 8)     Urine (mL/kg/hr) 1050      Total Output 1050      Net -390 +240                 Nutrition: Diet Regular; Regular House; Fluid Restriction 1200 ML  GI Proph/Bowel Reg: Colace, Senna  VTE Prophylaxis:venodynes, SQ heparin     Physical Exam:   GENERAL APPEARANCE: comfortable  NEURO: intact, GCS - 15  HEENT: EOM's intact  CV: RRR< no complaint of chest pain  LUNGS: CTA bilaterally, no shortness of breath, needs encouragement with IS use  GI: tolerating a diet  : voiding  MSK: moving extremities  SKIN: warm and dry    Invasive Devices  Report    Peripheral Intravenous Line  Duration           Peripheral IV 07/28/22 Distal;Dorsal (posterior); Right Forearm <1 day          Drain  Duration           External Urinary Catheter 4 days                 PIC Score  PIC Pain Score: 3 (7/28/2022  4:00 AM)  PIC Incentive Spirometry Score: Goal to alert volume (7/28/2022  4:00 AM)  PIC Cough Description: Weak (7/28/2022  4:00 AM)  PIC Total Score: 8 (7/28/2022  4:00 AM)       If the Total PIC Score </=5, did you consult APS and evaluate patient for further intervention?: yes      Pain:    Incentive Spirometry  Cough  3 = Controlled  4 = Above goal volume 3 = Strong  2 = Moderate  3 = Goal to alert volume 2 = Weak  1 = Severe  2 = Below alert volume 1 = Absent     1 = Unable to perform IS         Lab Results:    Latest Reference Range & Units 07/28/22 05:36   Sodium 135 - 147 mmol/L 134 (L)   Potassium 3 5 - 5 3 mmol/L 3 8   Chloride 96 - 108 mmol/L 95 (L)   CO2 21 - 32 mmol/L 34 (H)   Anion Gap 4 - 13 mmol/L 5   BUN 5 - 25 mg/dL 12   Creatinine 0 60 - 1 30 mg/dL 0 58 (L)   Glucose, Random 65 - 140 mg/dL 93   Calcium 8 4 - 10 2 mg/dL 9 1   eGFR ml/min/1 73sq m 81   (L): Data is abnormally low  (H): Data is abnormally high  Imaging/EKG Studies: none  Other Studies: none

## 2022-07-28 NOTE — ASSESSMENT & PLAN NOTE
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

## 2022-07-28 NOTE — UTILIZATION REVIEW
Continued Stay Review    Date: 7/28/22                          Current Patient Class: Inpatient  Current Level of Care: Med Surg    HPI:89 y o  female initially admitted on 7/23     Assessment/Plan: Sodium down trended as low as 124 on 07/24/2022  Uptrending since initiation of 2 g salt tabs TID  Continue serial BMPs  Home amlodipine was discontinued per Nephrology due to patient already being on a calcium channel blocker, diltiazem  Patient was also changed from Lasix to torsemide, per Nephrology  Supplemental oxygen via nasal cannula as needed to maintain saturations greater than or equal to 94%      Vital Signs:     Date/Time Temp Pulse Resp BP MAP (mmHg) SpO2 Calculated FIO2 (%) - Nasal Cannula Nasal Cannula O2 Flow Rate (L/min) O2 Device   07/28/22 1227 -- -- -- 108/61 -- -- -- -- --   07/28/22 10:52:10 98 4 °F (36 9 °C) 80 16 136/64 88 94 % -- -- --   07/28/22 06:01:07 98 °F (36 7 °C) 95 16 154/87 109 96 % -- -- --   07/28/22 03:09:28 98 3 °F (36 8 °C) 92 16 138/71 93 92 % -- -- --   07/27/22 23:47:34 -- 89 -- 121/64 83 96 % -- -- --   07/27/22 2345 -- -- -- 121/64 -- -- -- -- --   07/27/22 21:56:41 98 6 °F (37 °C) 85 17 116/55 75 93 % -- -- --   07/27/22 2000 -- -- -- -- -- -- 28 2 L/min Nasal cannula   07/27/22 19:16:07 98 1 °F (36 7 °C) 92 16 123/49 Abnormal  74 88 % Abnormal  -- -- --   07/27/22 17:47:50 -- 103 -- 141/65 90 87 % Abnormal  -- -- --   07/27/22 14:58:05 97 8 °F (36 6 °C) 96 16 125/56 79 95 % -- -- --   07/27/22 13:13:41 -- 88 -- 137/73 94 97 % -- -- --   07/27/22 13:11:12 -- 91 -- 137/73 94 94 % -- -- --   07/27/22 11:22:58 98 8 °F (37 1 °C) 78 -- 118/59 79 95 % -- -- --   07/27/22 08:13:19 -- 76 -- 93/49 Abnormal  64 Abnormal  94 % -- -- --   07/27/22 0800 -- -- -- -- -- -- -- -- Nasal cannula   07/27/22 07:24:11 98 3 °F (36 8 °C) 71 18 107/43 Abnormal  64 Abnormal  92 % -- -- --   07/27/22 05:24:40 -- 74 -- 122/63 83 95 % -- -- --   07/27/22 02:20:56 98 2 °F (36 8 °C) 75 -- 124/63 83 95 % -- -- --   07/27/22 00:01:05 -- 77 -- 134/71 92 95 % -- -- --   07/26/22 21:54:47 97 6 °F (36 4 °C) 93 17 135/63 87 95 % -- -- --   07/26/22 1839 -- -- -- 102/56 -- -- -- -- --   07/26/22 18:37:53 98 °F (36 7 °C) 82 18 91/53 66 94 % -- -- --   07/26/22 17:32:54 -- 78 -- 124/48 Abnormal  73 97 % -- -- --   07/26/22 15:13:24 98 5 °F (36 9 °C) 68 16 110/57 75 94 % -- -- --   07/26/22 10:56:37 98 4 °F (36 9 °C) 76 18 113/55 74 95 % -- -- --   07/26/22 0853 -- -- -- 125/56 -- -- -- -- --   07/26/22 0800 -- -- -- -- -- -- 28 2 L/min Nasal cannula   07/26/22 0703 -- -- -- 98/52 -- -- -- -- --   07/26/22 07:02:19 98 2 °F (36 8 °C) 63 18 105/45 Abnormal  65 94 % -- -- --   07/26/22 05:21:22 -- 81 -- 135/67 90 95 % -- -- --   07/26/22 0512 -- -- -- 135/67 -- -- -- -- --   07/26/22 02:47:49 98 1 °F (36 7 °C) 72 18 124/66 85 97 % -- -- --         Pertinent Labs/Diagnostic Results:     7/26 XR spine thoracic:  IMPRESSION:     Limited study due to bony demineralization      Unchanged appearance of thoracic compression fracture deformities        Results from last 7 days   Lab Units 07/27/22  0524 07/24/22  0515 07/23/22  1205   WBC Thousand/uL 7 42 8 96 11 71*   HEMOGLOBIN g/dL 10 7* 10 7* 12 9   HEMATOCRIT % 32 2* 32 5* 38 3   PLATELETS Thousands/uL 271 292 286   NEUTROS ABS Thousands/µL  --   --  9 37*         Results from last 7 days   Lab Units 07/28/22  0536 07/27/22  1825 07/27/22  0841 07/27/22  0524 07/26/22  1449   SODIUM mmol/L 134* 131* 128* 127* 126*   POTASSIUM mmol/L 3 8 4 1 4 2 4 3 4 3   CHLORIDE mmol/L 95* 96 93* 94* 92*   CO2 mmol/L 34* 32 31 29 30   ANION GAP mmol/L 5 3* 4 4 4   BUN mg/dL 12 11 9 10 12   CREATININE mg/dL 0 58* 0 62 0 63 0 61 0 72   EGFR ml/min/1 73sq m 81 80 79 80 74   CALCIUM mg/dL 9 1 8 7 8 9 8 8 8 5     Results from last 7 days   Lab Units 07/23/22  1205   AST U/L 21   ALT U/L 15   ALK PHOS U/L 137*   TOTAL PROTEIN g/dL 7 2   ALBUMIN g/dL 3 9   TOTAL BILIRUBIN mg/dL 0 52         Results from last 7 days   Lab Units 07/28/22  0536 07/27/22  1825 07/27/22  0841 07/27/22  0524 07/26/22  1449 07/26/22  0526 07/25/22  1423 07/25/22  0507 07/24/22  1755 07/24/22  0515 07/23/22  1205   GLUCOSE RANDOM mg/dL 93 126 105 91 108 88 144* 94 124 94 108     Results from last 7 days   Lab Units 07/26/22  1226   OSMOLALITY, SERUM mmol/*         Results from last 7 days   Lab Units 07/23/22  1205   CK TOTAL U/L 47             Results from last 7 days   Lab Units 07/24/22  0515   PROTIME seconds 13 1   INR  0 99   PTT seconds 36     Results from last 7 days   Lab Units 07/27/22  0524 07/26/22  1226   TSH 3RD GENERATON uIU/mL 2 820 1 833               Results from last 7 days   Lab Units 07/26/22  1226 07/24/22  1807   OSMOLALITY, SERUM mmol/*  --    OSMO UR mmol/KG  --  295     Results from last 7 days   Lab Units 07/24/22  1807   SODIUM UR  30           Medications:   Scheduled Medications:  acetaminophen, 975 mg, Oral, Q8H [address]  atorvastatin, 20 mg, Oral, Daily  cyanocobalamin, 1,000 mcg, Intramuscular, Q30 Days  Diclofenac Sodium, 2 g, Topical, 4x Daily  diltiazem, 120 mg, Oral, Q6H EVA  docusate sodium, 100 mg, Oral, BID  guaiFENesin, 600 mg, Oral, Q12H EVA  heparin (porcine), 5,000 Units, Subcutaneous, Q8H EVA  levothyroxine, 50 mcg, Oral, Daily  lidocaine, 2 patch, Topical, Daily  memantine, 10 mg, Oral, BID  methocarbamol, 250 mg, Oral, Q6H EVA  pantoprazole, 40 mg, Oral, Daily  senna, 2 tablet, Oral, Daily  sodium chloride, 2 g, Oral, TID With Meals  torsemide, 20 mg, Oral, Daily      Continuous IV Infusions:     PRN Meds:  HYDROmorphone, 0 2 mg, Intravenous, Q4H PRN  naloxone, 0 04 mg, Intravenous, Q1MIN PRN  ondansetron, 4 mg, Intravenous, Q4H PRN  oxyCODONE, 2 5 mg, Oral, Q4H PRN  oxyCODONE, 5 mg, Oral, Q4H PRN  polyethylene glycol, 17 g, Oral, Daily PRN        Discharge Plan: D    Network Utilization Review Department  ATTENTION: Please call with any questions or concerns to [phone] and carefully listen to the prompts so that you are directed to the right person  All voicemails are confidential   Farrel Bodily all requests for admission clinical reviews, approved or denied determinations and any other requests to dedicated fax number below belonging to the campus where the patient is receiving treatment   List of dedicated fax numbers for the Facilities:  [address] DENIALS (Administrative/Medical Necessity) [phone]   [address] (Maternity/NICU/Pediatrics) [phone]   [address] [place] [address]  [address] [facility] [place] [address] [address] [place] [address] Melissa Mejia Ramonedo [address] [place] [address] 951 [phone]

## 2022-07-28 NOTE — CASE MANAGEMENT
Case Management Progress Note    Patient name Mei Mcnamara  Location S /S -01 MRN [id number]  : 1933 Date 2022       LOS (days): 5  Geometric Mean LOS (GMLOS) (days): 3 10  Days to GMLOS:-1 7        OBJECTIVE:        Current admission status: Inpatient  Preferred Pharmacy: No Pharmacies Listed  Primary Care Provider: Wes Barahona MD    Primary Insurance: [facility]  Secondary Insurance:     PROGRESS NOTE:    Cm contacted patient's insurance company to obtain information on status on insurance authorization  CM informed by  that clinical information was obtained and that request is now under clinical review  Cm awaiting finale determination

## 2022-07-28 NOTE — ASSESSMENT & PLAN NOTE
Lab Results   Component Value Date    EGFR 81 07/28/2022    EGFR 80 07/27/2022    EGFR 79 07/27/2022    CREATININE 0 58 (L) 07/28/2022    CREATININE 0 62 07/27/2022    CREATININE 0 63 07/27/2022        - Patient with history of CKD stage 3 and baseline creatinine around 0 9  Patient follows with [facility] as outpatient  - Patient also known to 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   - No evidence of acute kidney injury at this time  - Monitor volume status and renal function with BMP  - Avoid nephrotoxic medications and hypotension   - Outpatient follow-up per routine

## 2022-07-28 NOTE — PROGRESS NOTES
NEPHROLOGY PROGRESS NOTE   Malen Fly 80 y o  female MRN: [id number]  Unit/Bed#: S -01 Encounter: [id number]    ASSESSMENT & PLAN:  51-year-old female with 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 for benign disease follows with [facility], hypertension, hypothyroidism, diastolic CHF was admitted to [facility] after presenting with fall at home resulting in multiple Left sided rib fracture and T3 compression fracture   Admission sodium was 127, decreased to 124 after which patient was started on salt tablets 1 g t i d  And also given normal saline 500 mL bolus with improvement in sodium to 127 but now again trending down to 125 on 7/26 and so nephrology consulted on 07/26          Acute hyponatremia    -suspect SIADH in the setting of pain     -admission sodium 127, trended down to 124 on 07/24 after which started on salt tablets 1 g t i d  And sodium level was still low at 125 on 07/26 after which dose of salt tablet was increased to 2 g t i d  Dayami Skipper -workup suggestive of urine osmolality 295 and urine sodium 30   Serum osmolality is 271, TSH normal at 2 8, random cortisol 16 3  Uric acid 5 2   -CT chest abdomen pelvis without contrast:  Left kidney absent, right kidney with 1 9 cm exophytic cyst   No evidence of malignancy  Bronchiectasis changes in the lungs   -lower extremity edema improving  -sodium level improving to 134 mEq/liter today, continue salt tablets 2 g t i d  And fluid restriction 1 2 L per day  Was switched to torsemide 20 mg daily on 07/27 from previous dose of Lasix 20 mg twice daily    continue torsemide 20 mg daily       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 in September 2021 as per records from [facility]   -baseline creatinine around 0 6-0 9   Renal function is currently stable     Creatinine today 0 58 mg/dL   Continue to follow-up with outpatient nephrologist at [facility] [place] by [facility] hypertension    -blood pressure currently stable, slightly on lower side      -continue diltiazem  at home dose  Will need to confirm the home dose  currently on 120 mg q 6 hours which is higher than maximum dose of diltiazem  -was also on amlodipine 2 5 mg which was held on 07/26 as patient already on other calcium channel blocker     -Lasix was switched to torsemide 20 mg daily on 07/27 continue at current dose       Chronic diastolic CHF    -at home was on Lasix 20 mg b i d  And was receiving Lasix 20 mg b i d  During hospital stay but in the setting of persistent lower extremity edema was transitioned to oral torsemide 20 mg daily on 07/27  Continue torsemide 20 mg daily, need to monitor input output and daily weight  Still with trace lower extremity edema, continue to monitor  If weight gain or edema worsening may need to increase to 20 mg b i d  Of torsemide       Status post fall with T3 compression fracture and multiple left-sided rib fracture    -continue management per Trauma Service      Discussed with primary team    SUBJECTIVE:  No new complaints  No chest pain or SOB  OBJECTIVE:  Current Weight: Weight - Scale: 85 7 kg (188 lb 15 oz)  Vitals:    07/28/22 1227   BP: 108/61   Pulse:    Resp:    Temp:    SpO2:        Intake/Output Summary (Last 24 hours) at 7/28/2022 1250  Last data filed at 7/28/2022 1101  Gross per 24 hour   Intake 600 ml   Output --   Net 600 ml       Physical Exam  General:  Ill looking, awake  Eyes: Conjunctivae pink,  Sclera anicteric  ENT: lips and mucous membranes moist  Neck: supple   Chest: Clear to Auscultation both lungs,  no crackles, ronchus or wheezing  CVS: S1 & S2 present, normal rate, regular rhythm, no murmur    Abdomen: soft, non-tender, non-distended, Bowel sounds normoactive  Extremities: trace edema of  legs  Skin: no rash  Neuro: awake, alert, oriented x 3   Psych: Mood and affect appropriate     Medications:    Current Facility-Administered Medications:     acetaminophen (TYLENOL) tablet 975 mg, 975 mg, Oral, Q8H [facility], Ruiz Giordano PA-C, 975 mg at 07/28/22 0605    atorvastatin (LIPITOR) tablet 20 mg, 20 mg, Oral, Daily, Ruiz Giordano PA-C, 20 mg at 07/28/22 0802    cyanocobalamin injection 1,000 mcg, 1,000 mcg, Intramuscular, Q30 Days, Ramírez Sellers PA-C, 1,000 mcg at 07/23/22 1744    Diclofenac Sodium (VOLTAREN) 1 % topical gel 2 g, 2 g, Topical, 4x Daily, Ruiz Giordano PA-C, 2 g at 07/28/22 1228    diltiazem (CARDIZEM) tablet 120 mg, 120 mg, Oral, Q6H [facility], Karena Gonzalez PA-C, 120 mg at 07/28/22 0605    docusate sodium (COLACE) capsule 100 mg, 100 mg, Oral, BID, Ramírez Sellers PA-C, 100 mg at 07/28/22 0802    guaiFENesin (MUCINEX) 12 hr tablet 600 mg, 600 mg, Oral, Q12H [facility], Mallorie Orlando, JULIANNP, 600 mg at 07/28/22 0802    heparin (porcine) subcutaneous injection 5,000 Units, 5,000 Units, Subcutaneous, Q8H [facility], Ramírez Sellers PA-C, 5,000 Units at 07/28/22 0606    HYDROmorphone HCl (DILAUDID) injection 0 2 mg, 0 2 mg, Intravenous, Q4H PRN, Ramírez Sellers PA-C    levothyroxine tablet 50 mcg, 50 mcg, Oral, Daily, Ruiz Giordano PA-C, 50 mcg at 07/28/22 0606    lidocaine (LIDODERM) 5 % patch 2 patch, 2 patch, Topical, Daily, Ramírez Sellers PA-C, 2 patch at 07/28/22 0801    memantine (NAMENDA) tablet 10 mg, 10 mg, Oral, BID, Ruiz Giordano PA-C, 10 mg at 07/28/22 0802    methocarbamol (ROBAXIN) tablet 250 mg, 250 mg, Oral, Q6H [facility], Ruiz Giordano PA-C, 250 mg at 07/28/22 1227    naloxone (NARCAN) 0 04 mg/mL syringe 0 04 mg, 0 04 mg, Intravenous, Q1MIN PRN, Ramírez Sellers PA-C    ondansetron (ZOFRAN) injection 4 mg, 4 mg, Intravenous, Q4H PRN, Ramírez Sellers PA-C, 4 mg at 07/24/22 1636    oxyCODONE (ROXICODONE) IR tablet 2 5 mg, 2 5 mg, Oral, Q4H PRN, Ramírez Sellers PA-C    oxyCODONE (ROXICODONE) IR tablet 5 mg, 5 mg, Oral, Q4H PRN, Ramírez Sellers PA-C, 5 mg at 07/27/22 0002    pantoprazole (PROTONIX) EC tablet 40 mg, 40 mg, Oral, Daily, Ruiz Giordano PA-C, 40 mg at 07/28/22 0801    polyethylene glycol (MIRALAX) packet 17 g, 17 g, Oral, Daily PRN, So Loza PA-C, 17 g at 07/23/22 1744    senna (SENOKOT) tablet 17 2 mg, 2 tablet, Oral, Daily, Ruiz Giordano PA-C, 17 2 mg at 07/28/22 0802    sodium chloride tablet 2 g, 2 g, Oral, TID With Meals, Jono Campos PA-C, 2 g at 07/28/22 1227    torsemide (DEMADEX) tablet 20 mg, 20 mg, Oral, Daily, Zuri Aquino MD, 20 mg at 07/28/22 0801    Invasive Devices:        Lab Results:   Results from last 7 days   Lab Units 07/28/22  0536 07/27/22  1825 07/27/22  0841 07/27/22  0524 07/24/22  1755 07/24/22  0515 07/23/22  1205   WBC Thousand/uL  --   --   --  7 42  --  8 96 11 71*   HEMOGLOBIN g/dL  --   --   --  10 7*  --  10 7* 12 9   HEMATOCRIT %  --   --   --  32 2*  --  32 5* 38 3   PLATELETS Thousands/uL  --   --   --  271  --  292 286   POTASSIUM mmol/L 3 8 4 1 4 2 4 3   < > 3 9 4 2   CHLORIDE mmol/L 95* 96 93* 94*   < > 91* 89*   CO2 mmol/L 34* 32 31 29   < > 32 29   BUN mg/dL 12 11 9 10   < > 11 7   CREATININE mg/dL 0 58* 0 62 0 63 0 61   < > 0 68 0 48*   CALCIUM mg/dL 9 1 8 7 8 9 8 8   < > 8 7 9 2   ALK PHOS U/L  --   --   --   --   --   --  137*   ALT U/L  --   --   --   --   --   --  15   AST U/L  --   --   --   --   --   --  21    < > = values in this interval not displayed  Previous work up:         Portions of the record may have been created with voice recognition software  Occasional wrong word or "sound a like" substitutions may have occurred due to the inherent limitations of voice recognition software  Read the chart carefully and recognize, using context, where substitutions have occurred  If you have any questions, please contact the dictating provider

## 2022-07-28 NOTE — ASSESSMENT & PLAN NOTE
Wt Readings from Last 3 Encounters:   07/28/22 85 7 kg (188 lb 15 oz)   07/14/22 85 1 kg (187 lb 9 8 oz)     - Patient with chronic history of CHF without evidence of acute exacerbation   - Continue home medication regimen  - Monitor volume status   - Outpatient follow-up per routine

## 2022-07-28 NOTE — PROGRESS NOTES
Progress Note - Geriatric Medicine   Inocencio Ervin 80 y o  female MRN: [id number]  Unit/Bed#: S -01 Encounter: [id number]      Assessment/Plan:    1  Hyponatremia, suspect SIADH in the setting of pain-The patient's sodium basleine appears to be 135-142  It trended down to 125 on 07/24/22, but it is up to 134 today  There were no overt signs of malignancy on multiple CTs without contrast, but there was diffuse honeycombing throughout the lung bilaterally with bronchiectatic and fibrotic changes  Continue salt tablets 2 gm TID  Continue fluid restriction of 1 2 L/day  Avoid the use of NSAIDs  Continue checking BMP every 8 hours       2  Chronic kidney disease stage 2: The patient's GFR is 81  Patient has solitary right kidney post left nephrectomy in September 2021  Baseline creatinine is around 0 6-0 9, and today it is 0 58  Renal function is currently stable, so continue to follow-up with outpatient nephrologist  Avoid nephrotoxic medications, hypotension, and IV contrast if possible       3  Primary hypertension: the patient's blood pressure has been fluctuating with periods of relative hypotension, but it is acceptable  It is 115/56 today  Home amlodipine was discontinued per nephrology due to patient already being on diltiazem  Patient was changed from Lasix to torsemide  Continue to monitor blood pressure, and outpatient follow-up will be needed      4  Closed fracture of multiple ribs of left side- Multiple left-sided rib fractures (5 &7) present on admission  Continue rib fracture protocol  Continue to encourage use of incentive spirometer, as she is getting to 750 mL currently  Continue multimodal analgesic regimen  Supplemental oxygen via nasal cannula as needed to maintain saturations greater than or equal to 94%  Repeat chest x-ray on 07/24/2022 was stable   Outpatient follow-up in the trauma clinic for re-evaluation in approximately 2 weeks      5  Closed wedge compression fracture of T3 vertebra-Mild T3 compression fracture on admission with progression when compared to prior imaging  Non-operative management was recommended  Initially, a TLSO brace was ordered, but neurosurgery decided no bracing at this time  Monitor neurovascular exam and utilize multimodal analgesic regimen as needed  Outpatient follow-up with neurosurgery for re-evaluation       6  Hypothyroidism- Continue home medication regimen, outpatient follow-up per routine     7  Chronic congestive heart failure- Continue home medication regimen, monitor volume status  Outpatient follow-up per routine    Subjective: The patient was being seen as a follow-up  She was sleeping when walking in, but woke up quickly and was alert  She said she has been sleeping well throughout the night and has been taking naps throughout the day  She admits to have some pain in her shoulder that she believes was caused by her fall  She claims had a bowel movement today and has been tolerating her diet well  She has no other acute complaints  Review of Systems   Constitutional: Negative  HENT: Negative  Eyes: Negative  Respiratory: Negative  Cardiovascular: Negative  Gastrointestinal: Negative  Endocrine: Negative  Genitourinary: Negative  Musculoskeletal:        Patient complaining of pain in her shoulder from her fall   Skin: Negative  Allergic/Immunologic: Negative  Neurological: Negative  Hematological: Negative  Psychiatric/Behavioral: Negative  Objective:     Vitals: Blood pressure 115/56, pulse 94, temperature 98 4 °F (36 9 °C), resp  rate 17, height 5' 2" (1 575 m), weight 85 7 kg (188 lb 15 oz), SpO2 92 %  ,Body mass index is 34 56 kg/m²        Intake/Output Summary (Last 24 hours) at 7/28/2022 1646  Last data filed at 7/28/2022 1401  Gross per 24 hour   Intake 1080 ml   Output 500 ml   Net 580 ml       Current Medications: Reviewed    Physical Exam:   Physical Exam  Constitutional:       Appearance: Normal appearance  HENT:      Head: Normocephalic  Cardiovascular:      Rate and Rhythm: Normal rate and regular rhythm  Pulses: Normal pulses  Heart sounds: Normal heart sounds  Pulmonary:      Effort: Pulmonary effort is normal       Breath sounds: Normal breath sounds  Abdominal:      General: Abdomen is flat  Bowel sounds are normal       Palpations: Abdomen is soft  Skin:     General: Skin is warm  Neurological:      General: No focal deficit present  Mental Status: She is alert and oriented to person, place, and time  Psychiatric:         Behavior: Behavior normal          Thought Content: Thought content normal           Invasive Devices  Report    Peripheral Intravenous Line  Duration           Peripheral IV 07/28/22 Distal;Dorsal (posterior); Right Forearm <1 day          Drain  Duration           External Urinary Catheter 5 days            Scribed by LORNA Lawson, after evaluating with Dr Dewayne Santo  Lab, Imaging and other studies: I have personally reviewed pertinent reports

## 2022-07-29 PROBLEM — M25.512 ACUTE PAIN OF LEFT SHOULDER: Status: ACTIVE | Noted: 2022-07-29

## 2022-07-29 LAB
ANION GAP SERPL CALCULATED.3IONS-SCNC: 5 MMOL/L (ref 4–13)
BUN SERPL-MCNC: 13 MG/DL (ref 5–25)
CALCIUM SERPL-MCNC: 9 MG/DL (ref 8.4–10.2)
CHLORIDE SERPL-SCNC: 95 MMOL/L (ref 96–108)
CO2 SERPL-SCNC: 35 MMOL/L (ref 21–32)
CREAT SERPL-MCNC: 0.56 MG/DL (ref 0.6–1.3)
FLUAV RNA RESP QL NAA+PROBE: NEGATIVE
FLUBV RNA RESP QL NAA+PROBE: NEGATIVE
GFR SERPL CREATININE-BSD FRML MDRD: 82 ML/MIN/1.73SQ M
GLUCOSE SERPL-MCNC: 92 MG/DL (ref 65–140)
POTASSIUM SERPL-SCNC: 4.2 MMOL/L (ref 3.5–5.3)
RSV RNA RESP QL NAA+PROBE: NEGATIVE
SARS-COV-2 RNA RESP QL NAA+PROBE: NEGATIVE
SODIUM SERPL-SCNC: 135 MMOL/L (ref 135–147)

## 2022-07-29 PROCEDURE — 99232 SBSQ HOSP IP/OBS MODERATE 35: CPT | Performed by: INTERNAL MEDICINE

## 2022-07-29 PROCEDURE — 99232 SBSQ HOSP IP/OBS MODERATE 35: CPT | Performed by: SURGERY

## 2022-07-29 PROCEDURE — 97116 GAIT TRAINING THERAPY: CPT

## 2022-07-29 PROCEDURE — 97110 THERAPEUTIC EXERCISES: CPT

## 2022-07-29 PROCEDURE — 0241U HB NFCT DS VIR RESP RNA 4 TRGT: CPT | Performed by: NURSE PRACTITIONER

## 2022-07-29 PROCEDURE — 80048 BASIC METABOLIC PNL TOTAL CA: CPT | Performed by: INTERNAL MEDICINE

## 2022-07-29 PROCEDURE — 97530 THERAPEUTIC ACTIVITIES: CPT

## 2022-07-29 RX ORDER — SODIUM CHLORIDE 1000 MG
2 TABLET, SOLUBLE MISCELLANEOUS 2 TIMES DAILY WITH MEALS
Status: DISCONTINUED | OUTPATIENT
Start: 2022-07-29 | End: 2022-07-30 | Stop reason: HOSPADM

## 2022-07-29 RX ADMIN — ATORVASTATIN CALCIUM 20 MG: 20 TABLET, FILM COATED ORAL at 08:00

## 2022-07-29 RX ADMIN — METHOCARBAMOL 250 MG: 500 TABLET ORAL at 05:59

## 2022-07-29 RX ADMIN — DICLOFENAC SODIUM TOPICAL GEL, 1%, 2 G: 10 GEL TOPICAL at 21:36

## 2022-07-29 RX ADMIN — DICLOFENAC SODIUM TOPICAL GEL, 1%, 2 G: 10 GEL TOPICAL at 08:05

## 2022-07-29 RX ADMIN — DILTIAZEM HYDROCHLORIDE 120 MG: 60 TABLET, FILM COATED ORAL at 01:10

## 2022-07-29 RX ADMIN — METHOCARBAMOL 250 MG: 500 TABLET ORAL at 17:58

## 2022-07-29 RX ADMIN — SODIUM CHLORIDE 2 G: 1 TABLET ORAL at 07:59

## 2022-07-29 RX ADMIN — SENNOSIDES 17.2 MG: 8.6 TABLET, FILM COATED ORAL at 08:00

## 2022-07-29 RX ADMIN — DICLOFENAC SODIUM TOPICAL GEL, 1%, 2 G: 10 GEL TOPICAL at 11:58

## 2022-07-29 RX ADMIN — SODIUM CHLORIDE 2 G: 1 TABLET ORAL at 11:58

## 2022-07-29 RX ADMIN — OXYCODONE HYDROCHLORIDE 2.5 MG: 5 TABLET ORAL at 19:41

## 2022-07-29 RX ADMIN — DILTIAZEM HYDROCHLORIDE 120 MG: 60 TABLET, FILM COATED ORAL at 05:59

## 2022-07-29 RX ADMIN — HEPARIN SODIUM 5000 UNITS: 5000 INJECTION INTRAVENOUS; SUBCUTANEOUS at 05:59

## 2022-07-29 RX ADMIN — TORSEMIDE 20 MG: 20 TABLET ORAL at 08:00

## 2022-07-29 RX ADMIN — HEPARIN SODIUM 5000 UNITS: 5000 INJECTION INTRAVENOUS; SUBCUTANEOUS at 21:34

## 2022-07-29 RX ADMIN — LEVOTHYROXINE SODIUM 50 MCG: 50 TABLET ORAL at 05:59

## 2022-07-29 RX ADMIN — DICLOFENAC SODIUM TOPICAL GEL, 1%, 2 G: 10 GEL TOPICAL at 18:00

## 2022-07-29 RX ADMIN — SODIUM CHLORIDE 2 G: 1 TABLET ORAL at 18:02

## 2022-07-29 RX ADMIN — DOCUSATE SODIUM 100 MG: 100 CAPSULE, LIQUID FILLED ORAL at 08:00

## 2022-07-29 RX ADMIN — MEMANTINE 10 MG: 10 TABLET ORAL at 08:00

## 2022-07-29 RX ADMIN — PANTOPRAZOLE SODIUM 40 MG: 40 TABLET, DELAYED RELEASE ORAL at 08:00

## 2022-07-29 RX ADMIN — LIDOCAINE 5% 2 PATCH: 700 PATCH TOPICAL at 08:00

## 2022-07-29 RX ADMIN — ACETAMINOPHEN 975 MG: 325 TABLET ORAL at 13:12

## 2022-07-29 RX ADMIN — HEPARIN SODIUM 5000 UNITS: 5000 INJECTION INTRAVENOUS; SUBCUTANEOUS at 13:12

## 2022-07-29 RX ADMIN — MEMANTINE 10 MG: 10 TABLET ORAL at 17:58

## 2022-07-29 RX ADMIN — METHOCARBAMOL 250 MG: 500 TABLET ORAL at 01:10

## 2022-07-29 RX ADMIN — GUAIFENESIN 600 MG: 600 TABLET ORAL at 08:00

## 2022-07-29 RX ADMIN — ACETAMINOPHEN 975 MG: 325 TABLET ORAL at 21:33

## 2022-07-29 RX ADMIN — ACETAMINOPHEN 975 MG: 325 TABLET ORAL at 05:59

## 2022-07-29 RX ADMIN — METHOCARBAMOL 250 MG: 500 TABLET ORAL at 11:58

## 2022-07-29 RX ADMIN — GUAIFENESIN 600 MG: 600 TABLET ORAL at 21:34

## 2022-07-29 RX ADMIN — DILTIAZEM HYDROCHLORIDE 120 MG: 60 TABLET, FILM COATED ORAL at 17:56

## 2022-07-29 NOTE — PROGRESS NOTES
[facility]  Progress Note - Jim Petit 1933, 80 y o  female MRN: [id number]  Unit/Bed#: S -01 Encounter: [id number]  Primary Care Provider: Harmeet Deshpande MD   Date and time admitted to hospital: 7/23/2022 10:43 AM    Fall  Assessment & Plan  - Status post fall with the below noted injuries  - Fall precautions  - Geriatric Medicine consultation for evaluation, medication review and recommendations   - PT and OT evaluation and treatment as indicated  - Case Management consultation for disposition planning  * Closed fracture of multiple ribs of left side  Assessment & Plan  - Multiple left-sided rib fractures (5 & 7), present on admission   - Continue rib fracture protocol   - Continue to encourage incentive spirometer use and adequate pulmonary hygiene  Currently pulling 500-750 mL on I S  Flutter valve also given to patient  - PIC score is 8   - Continue multimodal analgesic regimen  Appreciate APS evaluation and recommendations   - Supplemental oxygen via nasal cannula as needed to maintain saturations greater than or equal to 94%  - Repeat chest x-ray on 7/24/2022 stable  - PT and OT evaluation and treatment as indicated  - Outpatient follow-up in the trauma clinic for re-evaluation in approximately 2 weeks  Closed wedge compression fracture of T3 vertebra (HCC)  Assessment & Plan  - Mild T3 compression fracture, present on admission, with progression when compared to prior imaging   - Appreciate Neurosurgery evaluation and recommendations  Non-operative management recommended  - Bracing: Initially a TLSO brace was ordered  After discussion with Neurosurgery, bracing for this fracture will be difficult due to level of fracture  Would likely need cervical collar and TLSO brace or a  brace, but neither may cover the fracture level well per Neurosurgery  No bracing at this time  - Monitor neurovascular exam   - Multimodal analgesic regimen as needed    - PT and OT evaluation and treatment as indicated  - Outpatient follow up with Neurosurgery for re-evaluation  Acute pain of left shoulder  Assessment & Plan  · Patient complaining of left shoulder pain since admission  · Further conversation with son reports that shoulder pain started approximately a week prior  · XR negative for acute traumatic injury  Decreased ROM and positive Neers test    · Multimodal pain regimen  · Follow-up with orthopedics as an outpatient as needed  Hyponatremia  Assessment & Plan  - Significant hyponatremia, present on presentation, with sodium of 127   - Sodium down trended as low as 124 on 07/24/2022   135 today  Uptrending since initiation of 2 g salt tabs TID  - nephrology consulted and note appreciated  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  - continue Salt tabs 2 g TID  - Continue to monitor sodium with serial BMP,   - Outpatient follow-up with PCP  Hypothyroid  Assessment & Plan  - Patient with chronic history of hypothyroidism   - Continue home medication regimen   - Outpatient follow-up per routine  Chronic congestive heart failure (HCC)  Assessment & Plan  Wt Readings from Last 3 Encounters:   07/29/22 79 9 kg (176 lb 2 4 oz)   07/14/22 85 1 kg (187 lb 9 8 oz)     - Patient with chronic history of CHF without evidence of acute exacerbation   - Continue home medication regimen  - Monitor volume status   - Outpatient follow-up per routine  CKD (chronic kidney disease) stage 3, GFR 30-59 ml/min [facility])  Assessment & Plan  Lab Results   Component Value Date    EGFR 82 07/29/2022    EGFR 81 07/28/2022    EGFR 80 07/27/2022    CREATININE 0 56 (L) 07/29/2022    CREATININE 0 58 (L) 07/28/2022    CREATININE 0 62 07/27/2022        - Patient with history of CKD stage 3 and baseline creatinine around 0 9  Patient follows with [facility] as outpatient    - Patient also known to 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   - No evidence of acute kidney injury at this time  - Monitor volume status and renal function with BMP  - Avoid nephrotoxic medications and hypotension   - Outpatient follow-up per routine  HTN (hypertension)  Assessment & Plan  - Chronic history of hypertension   - home amlodipine was discontinued per Nephrology due to patient already being on a calcium channel blocker, diltiazem  - patient was also changed from Lasix to torsemide, per Nephrology  - continue to monitor I&O  - continue to monitor blood pressure  - Outpatient follow-up per routine  Disposition:  Pending placement    SUBJECTIVE:  Chief Complaint:  Left shoulder pain    Subjective:  Patient states that she continues to have left shoulder pain  She denies any worsening of the pain  She notes decreased range of motion of her left shoulder which started prior to the fall  She does also have left-sided rib pain  She denies any current shortness of breath or difficulty breathing  She continues to use her incentive spirometry pulling 500-750 mL on her IS  Patient reports having a bowel movement this morning  Nursing staff denies any events overnight  Son was at bedside, all questions were answered  OBJECTIVE:   Vitals:   Temp:  [97 3 °F (36 3 °C)-98 7 °F (37 1 °C)] 97 9 °F (36 6 °C)  HR:  [78-99] 84  Resp:  [16-18] 16  BP: ()/(55-85) 98/67    Intake/Output:  I/O       07/27 0701  07/28 0700 07/28 0701  07/29 0700 07/29 0701  07/30 0700    P  O  240 840     Total Intake(mL/kg) 240 (2 8) 840 (10 5)     Urine (mL/kg/hr)  950 (0 5)     Total Output  950     Net +240 -110            Unmeasured Urine Occurrence  1 x          Nutrition: Diet Regular; Regular House; Fluid Restriction 1200 ML  GI Proph/Bowel Reg:  Colace, Senna  VTE Prophylaxis:Sequential compression device (Venodyne)  and Enoxaparin (Lovenox)     Physical Exam:   GENERAL APPEARANCE: NAD  NEURO: GCS 15  HEENT: NC, AT  PERRL  EOMI  Neck supple    CV: RRR, +2 radial dorsalis pedis pulses, bilaterally  + murmur  LUNGS:  Clear to auscultation, bilaterally  Patient displays the ability to inspire 500 mL on IS  GI:  Abdomen is soft nontender  :  Pelvis stable  MSK:  No tenderness on palpation of left shoulder or left upper extremity  Patient has decreased range of motion of left shoulder  Positive Neer's sign  No deformities  SKIN:  Warm, dry  Invasive Devices  Report    Peripheral Intravenous Line  Duration           Peripheral IV 07/28/22 Distal;Dorsal (posterior); Right Forearm 1 day          Drain  Duration           External Urinary Catheter 5 days                 PIC Score  PIC Pain Score: 3 (7/29/2022  8:00 AM)  PIC Incentive Spirometry Score: Below alert volume (7/29/2022  8:00 AM)  PIC Cough Description: Weak (7/29/2022  8:00 AM)  PIC Total Score: 7 (7/29/2022  8:00 AM)       If the Total PIC Score </=5, did you consult APS and evaluate patient for further intervention?: yes      Pain:    Incentive Spirometry  Cough  3 = Controlled  4 = Above goal volume 3 = Strong  2 = Moderate  3 = Goal to alert volume 2 = Weak  1 = Severe  2 = Below alert volume 1 = Absent     1 = Unable to perform IS         Lab Results:   Results: I have personally reviewed all pertinent laboratory/tests results, BMP/CMP:   Lab Results   Component Value Date    SODIUM 135 07/29/2022    K 4 2 07/29/2022    CL 95 (L) 07/29/2022    CO2 35 (H) 07/29/2022    BUN 13 07/29/2022    CREATININE 0 56 (L) 07/29/2022    CALCIUM 9 0 07/29/2022    EGFR 82 07/29/2022    and CBC: No results found for: WBC, HGB, HCT, MCV, PLT, ADJUSTEDWBC, MCH, MCHC, RDW, MPV, NRBC  Imaging/EKG Studies: I have personally reviewed pertinent reports        Other Studies: none

## 2022-07-29 NOTE — CASE MANAGEMENT
Case Management Discharge Planning Note    Patient name Christian Carl S /S -01 MRN [id number]  : 1933 Date 2022       Current Admission Date: 2022  Current Admission Diagnosis:Closed fracture of multiple ribs of left side   Patient Active Problem List    Diagnosis Date Noted    Acute pain of left shoulder 2022    Fall 2022    Closed fracture of multiple ribs of left side 2022    Closed wedge compression fracture of T3 vertebra ([facility] [address] ) 2022    HTN (hypertension) 2022    CKD (chronic kidney disease) stage 3, GFR 30-59 ml/min ([facility]) 2022    Chronic congestive heart failure ([facility] [address] ) 2022    Hypothyroid 2022    Hyponatremia 2022      LOS (days): 6  Geometric Mean LOS (GMLOS) (days): 3 10  Days to GMLOS:-2 9     OBJECTIVE:  Risk of Unplanned Readmission Score: 13 36      Current admission status: Inpatient   Preferred Pharmacy: No Pharmacies Listed  Primary Care Provider: Prema Figueroa MD    Primary Insurance: [facility]  Secondary Insurance:     DISCHARGE DETAILS:  CM spoke with Dawna Villalta from [facility]  AND [facility], [phone]  CM reviewed PT treat note from today   Dawna Villalta stated patient is at her baseline and facility is able to accept back tomorrow   Facility requesting a Covid swab, PT/OT eval and treat  orders and hard script for any new medications at discharge  CM spoke with patient's son Hung Montoya on the phone, [phone]  CM introduced self and role  CM reviewed with patient's son on the phone PT treat notes from today  Son updated CM did speak with Dawna Villalta from [facility]  Facility is able to accept back today  CM updated patient's son Sheryl Arias in [place] is able to accept for inpatient rehab  Son requesting patient return to prior level of living  CM discussed transport at discharge  Patient's son requesting CM assist with transport at discharge   CM discussed with son no out of pocket cost with [facility]  Son requesting to move forward with [facility] at discharge  CM will f/u with son re:DCP  All questions/concerns answered at this time  CM sent referral to [facility]  Waiting for transport time

## 2022-07-29 NOTE — PHYSICAL THERAPY NOTE
PHYSICAL THERAPY NOTE          Patient Name: Betty Murphy  JTMRB'C Date: 7/29/2022 07/29/22 1348   PT Last Visit   PT Visit Date 07/29/22   Note Type   Note Type Treatment   Pain Assessment   Pain Assessment Tool 0-10   Pain Score No Pain   Pain Location/Orientation Orientation: Left; Location: Rib Cage   Effect of Pain on Daily Activities limited activity tolerance   Patient's Stated Pain Goal No pain   Hospital Pain Intervention(s) Ambulation/increased activity; Emotional support;Repositioned; Rest   Multiple Pain Sites No   Pain Rating: FLACC (Rest) - Face 0   Pain Rating: FLACC (Rest) - Legs 0   Pain Rating: FLACC (Rest) - Activity 0   Pain Rating: FLACC (Rest) - Cry 0   Pain Rating: FLACC (Rest) - Consolability 0   Score: FLACC (Rest) 0   Pain Rating: FLACC (Activity) - Face 1   Pain Rating: FLACC (Activity) - Legs 0   Pain Rating: FLACC (Activity) - Activity 0   Pain Rating: FLACC (Activity) - Cry 0   Pain Rating: FLACC (Activity) - Consolability 0   Score: FLACC (Activity) 1   Restrictions/Precautions   Weight Bearing Precautions Per Order No   Braces or Orthoses   (TLSO worn for comfort)   Other Precautions Cognitive; Bed Alarm; Chair Alarm;O2;Fall Risk   General   Chart Reviewed Yes   Additional Pertinent History 2L NC 02, Sp02 decrease to 91% post ambulation trial and 90% on 2nd ambulation trial  LA on 1st ambulation trial 101 and on 2nd ambulation trial 98   Response to Previous Treatment Patient with no complaints from previous session  Family/Caregiver Present No   Cognition   Overall Cognitive Status Impaired   Arousal/Participation Alert; Responsive; Cooperative   Attention Attends with cues to redirect   Orientation Level Oriented X4   Memory Decreased short term memory;Decreased recall of precautions   Following Commands Follows one step commands with increased time or repetition   Comments pt was pleasant and cooperative throughout tx session   Subjective   Subjective pt was agreeable to participate in PT intervention   Bed Mobility   Rolling R Unable to assess   Rolling L Unable to assess   Supine to Sit Unable to assess   Sit to Supine Unable to assess   Additional Comments pt seated OOb in the recliner pre/post tx session   Transfers   Sit to Stand 5  Supervision   Additional items Assist x 1; Armrests; Increased time required;Verbal cues  (VC's for hand placement w/ RW)   Stand to Sit 5  Supervision   Additional items Assist x 1; Armrests; Increased time required;Verbal cues  (VC's for hand placement w/ RW)   Stand pivot 5  Supervision   Additional items Assist x 1; Armrests; Increased time required;Verbal cues  (VC's for hand placement w/ RW)   Toilet transfer 5  Supervision   Additional items Assist x 1; Increased time required;Verbal cues; Commode  (/w RW)   Additional Comments pt continues to require RW and VC;s to complete all functional transfers safely in todays tx session   Ambulation/Elevation   Gait pattern Wide GEORGETTE; Decreased foot clearance; Short stride; Excessively slow;Decreased heel strike;Decreased toe off   Gait Assistance 5  Supervision   Additional items Assist x 1;Verbal cues   Assistive Device Rolling walker   Distance 30'x2 RW   Ambulation/Elevation Additional Comments pt cotninues to be limited with functional mobility and required 5 minutes of therapeutic seated rest in between ambulation trials   Balance   Static Sitting Fair -   Dynamic Sitting Fair -   Static Standing Fair -   Ambulatory Fair -   Endurance Deficit   Endurance Deficit Yes   Endurance Deficit Description limited activity tolerance, functional mobility and ambulation distance   Activity Tolerance   Activity Tolerance Patient limited by fatigue   Nurse Made Aware Spoke to RN   Exercises   Hip Abduction Sitting;15 reps;AROM; Bilateral   Hip Adduction Sitting;15 reps;AROM; Bilateral  (pillow squeezes)   Knee AROM [place] Angelia Burns reps;AROM; Bilateral   Ankle Pumps Sitting;20 reps;AROM; Bilateral   Marching Sitting;15 reps;AROM; Bilateral   Assessment   Prognosis Fair   Problem List Decreased strength;Decreased range of motion;Decreased endurance; Impaired balance;Decreased mobility; Decreased safety awareness;Orthopedic restrictions;Pain   Assessment pt began tx session seated OOB in the recliner and was agreeable to participate in Pt intervention  progress was noted this tx session as pt was able to perform multiple STS to and from RW w/ a decrease in level of assistance required /s w/ VC's for hand placement while ascending to RW and descending back into recliner   pt continues to be limited with functional mobility, activity tolerance and ambulation distance secondary to fatigue  pt required several therapeutic rest breaks throughout tx session and a 5 minute seated rest in between ambulation trials  pt was able to complete TE activities seated in recliner with no increases in pain and good from throughout TE activities  pt would benefit from continued skilled PT intervention in order to maximize pt functional independence and safety with all OOB mobility  post tx pt in recliner with call bell and all pt needs met   Goals   Patient Goals to get back into recliner   STG Expiration Date 08/03/22   PT Treatment Day 3   Plan   Treatment/Interventions Functional transfer training;LE strengthening/ROM; Therapeutic exercise; Endurance training;Patient/family training;Equipment eval/education; Bed mobility;Gait training;Spoke to nursing   Progress Slow progress, decreased activity tolerance   PT Frequency 4-6x/wk   Recommendation   PT Discharge Recommendation Post acute rehabilitation services  (vs return to facility w/ PT services)   Equipment Recommended Neisha Duggan [address]   Turning in Bed Without Bedrails 2   Lying on Back to Sitting on Edge of Flat Bed 2   Moving Bed to Chair 3 Standing Up From Chair 3   Walk in Room 3   Climb 3-5 Stairs 1   Basic Mobility Inpatient Raw Score 14   Basic Mobility Standardized Score 35 55   Highest Level Of Mobility   -HLM Goal 4: Move to chair/commode   JH-HLM Achieved 7: Walk 25 feet or more   Education   Education Provided Mobility training;Assistive device   Patient Reinforcement needed   End of Consult   Patient Position at End of Consult Bedside chair; All needs within reach;Bed/Chair alarm activated   The patient's AM-PAC Basic Mobility Inpatient Short Form Raw Score is 14  A Raw score of less than or equal to 16 suggests the patient may benefit from discharge to post-acute rehabilitation services  Please also refer to the recommendation of the Physical Therapist for safe discharge planning       Debra Chrisitansen, PTA

## 2022-07-29 NOTE — ASSESSMENT & PLAN NOTE
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

## 2022-07-29 NOTE — ASSESSMENT & PLAN NOTE
Wt Readings from Last 3 Encounters:   07/29/22 79 9 kg (176 lb 2 4 oz)   07/14/22 85 1 kg (187 lb 9 8 oz)     - Patient with chronic history of CHF without evidence of acute exacerbation   - Continue home medication regimen  - Monitor volume status   - Outpatient follow-up per routine

## 2022-07-29 NOTE — DISCHARGE SUMMARY
Discharge Summary - Jasmeet Mccurdy 80 y o  female MRN: [id number]    Unit/Bed#: S -01 Encounter: [id number]    Admission Date:   Admission Orders (From admission, onward)     Ordered        07/23/22 1408  Inpatient Admission  Once                        Admitting Diagnosis: Shoulder pain [M25 519]  Closed fracture of multiple ribs of left side, initial encounter [S22 42XA]  Closed fracture of third thoracic vertebra, unspecified fracture morphology, initial encounter ([facility] ) Joi Barreto    HPI: Per Marsha Giordano's H&P on 7/29: "Jasmeet Mccurdy is a 80 y o  female who presents with generalized pain and soreness, most specifically in her left chest wall and left shoulder however  She notes that she fell approximately 1 week ago, but her ER workup was relatively unremarkable and she was able to be discharged without prolonged hospital stay or interventions  Today, she was in her recliner on a fleese blanket when she slipped to the floor  She was unable to get up "    Procedures Performed: No orders of the defined types were placed in this encounter  Summary of Hospital Course: This is an 24-year-old female that was admitted 6 days ago after following approximately 1 week prior, she was found to have multiple rib fractures and a T3 fracture  Neurosurgery was consulted and patient was deemed non operative management  She was braced with a TLSO brace that she is to wear when upright greater than 45°  She is to follow up with Neurosurgery as an outpatient  Regarding her rib fractures, the rib fracture protocol was initiated and patient was educated on the importance of using incentive spirometry, deep breathing, coughing to prevent pneumonia  She has been inspiring 500-750 mL on her IS  Her respiratory and pain was monitored closely during her admission    She continues to be on her baseline 0-2 L NC, as needed--no additional supplemental oxygen required to maintain oxygen saturations greater than 92%   Patient has not had any shortness of breath or difficulty breathing  Her hospitalization was prolonged due to difficult placement and hyponatremia  Nephrology was consulted for management of her hyponatremia, she was on escalating doses of salt tabs  Ultimately patient discharged on 2 g salt tablets b i d  Her sodium has now stabilized and is within normal limits  She will require follow-up with nephrology/PCP for continued monitoring of her CKD and hyponatremia  Her vital signs and laboratory findings have been stable  She was evaluated by PT/OT and recommended for rehab  Patient received placement had [facility]  Prior to discharge and extensive discussion with patient and family regarding hospitalization, diagnostic studies, discharge medications, return precautions, required follow-up  At the completion of our conversation patient and family deny having any questions and agree with discharge at this time  Significant Findings, Care, Treatment and Services Provided:   XR chest pa & lateral    Result Date: 7/25/2022  Impression: Emphysema, traction bronchiectasis and thickening of the axial interstitium, similar to prior CT  Nondisplaced left side rib fractures seen on prior CT are not visualized radiographically  Workstation performed: [id number]     XR spine thoracic 3 vw    Result Date: 7/26/2022  Impression: Limited study due to bony demineralization  Unchanged appearance of thoracic compression fracture deformities  Workstation performed: [id number]     XR shoulder 2+ views LEFT    Result Date: 7/23/2022  Impression: No acute osseous abnormality  Deformity of the left humeral head consistent with old trauma  Workstation performed: [id number]     CT head without contrast    Result Date: 7/23/2022  Impression: 1  Somewhat limited examination due to patient motion artifact  2   Stable cerebral atrophy with 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    No acute intracranial abnormality  If there is continued concern for acute intracranial injury, consider follow-up neurology consultation and/or MRI of the brain with T2 gradient echo weighted sequencing and FLAIR weighted sequencing  Workstation performed: [id number]     CT chest without contrast    Result Date: 7/23/2022  Impression: 1  Acute fractures of the left 5th and 7th ribs  No evidence of pneumothorax  2   Mild compression fracture T3, progressed from the prior study  3   Additional, stable incidental findings as described above  If warranted, consider follow-up trauma surgery and/or neuro surgery consultation  The study was marked in [facility] for immediate notification  Workstation performed: [id number]     CT spine cervical without contrast    Result Date: 7/23/2022  Impression: Stable degenerative changes of the cervical spine  No acute cervical spine fracture or traumatic malalignment  Workstation performed: [id number]     CT recon only thoracic spine    Result Date: 7/23/2022  Impression: 1  Progression of mild compression fracture T3 vertebral body  2   Stable compression fractures of T4 and T9 vertebral bodies  Workstation performed: [id number]       Complications: none    Discharge Diagnosis:  Fall, rib fractures, hyponatremia, CKD  Medical Problems             Resolved Problems  Date Reviewed: 7/29/2022   None                 Condition at Discharge: good         Discharge instructions/Information to patient and family:   See after visit summary for information provided to patient and family  Provisions for Follow-Up Care:  See after visit summary for information related to follow-up care and any pertinent home health orders  PCP: Sushila Lorenz MD    Disposition: Skilled nursing facility at [facility] Readmission: No      Discharge Statement   I spent 27 minutes discharging the patient  This time was spent on the day of discharge  I had direct contact with the patient on the day of discharge  Additional documentation is required if more than 30 minutes were spent on discharge  Discharge Medications:  See after visit summary for reconciled discharge medications provided to patient and family

## 2022-07-29 NOTE — CASE MANAGEMENT
Case Management Discharge Planning Note    Patient name [facility]ra [facility]  Location S /S -01 MRN [id number]  : 1933 Date 2022       Current Admission Date: 2022  Current Admission Diagnosis:Closed fracture of multiple ribs of left side   Patient Active Problem List    Diagnosis Date Noted    Acute pain of left shoulder 2022    Fall 2022    Closed fracture of multiple ribs of left side 2022    Closed wedge compression fracture of T3 vertebra ([facility] [address] ) 2022    HTN (hypertension) 2022    CKD (chronic kidney disease) stage 3, GFR 30-59 ml/min ([facility]) 2022    Chronic congestive heart failure ([facility] [address] ) 2022    Hypothyroid 2022    Hyponatremia 2022      LOS (days): 6  Geometric Mean LOS (GMLOS) (days): 3 10  Days to GMLOS:-2 8     OBJECTIVE:  Risk of Unplanned Readmission Score: 13 36      Current admission status: Inpatient   Preferred Pharmacy: No Pharmacies Listed  Primary Care Provider: Curtis Kelly MD    Primary Insurance: [facility]  Secondary Insurance:     DISCHARGE DETAILS:    Discharge planning discussed with[de-identified] patient's son Angeline Setting of Choice: Yes     CM contacted family/caregiver?: Yes  Were Treatment Team discharge recommendations reviewed with patient/caregiver?: Yes  Did patient/caregiver verbalize understanding of patient care needs?: Yes  Were patient/caregiver advised of the risks associated with not following Treatment Team discharge recommendations?: Yes    Contacts  Patient Contacts: Lisa Banksing  Relationship to Patient[de-identified] Family  Contact Method: Phone  Phone Number: [phone]  Reason/Outcome: Continuity of Care, Emergency Contact, Discharge Planning, Referral    [facility]         Is the patient interested in [facility] at discharge?: No    DME Referral Provided  Referral made for DME?: No    Other Referral/Resources/Interventions Provided:  Referral Comments: [facility] is unable to accept due to insurance out of network, [facility] reviewing/[facility] reviewing  Needs Auth    Treatment Team Recommendation: Short Term Rehab  Discharge Destination Plan[de-identified] Short Term Rehab      CM spoke with Margret Ga from [facility]  CM introduced self and role  Per Margret Ga, facility may be able to accept back if patient is close to baseline  CM reviewed PT note from 7/27  CM requested PT treat today  CM will f/u with Margret Ga PT progress to determine plan of care  Margret Ga contact number is [phone]  CM spoke with patient's son Ralph Carter on the phone, [phone]  CM introduced self and role  Patient's son updated CM did speak with Margret Ga from [facility]  Son stated he was present at bedside during her visit  Son updated CM requested PT to provide treat at bedside  CM will f/u with patient's son and Margret Ga from [facility] to discuss plan of care  Son updated patient may be able to return to prior level of function pending progression  Son stated he will also be speaking with [facility] to make sure facility is able to provide care that his mother needs at discharge  CM sent updated referral to [facility]  CM waiting for response if able to accept with insurance  All questions/concerns answered at this time

## 2022-07-29 NOTE — ASSESSMENT & PLAN NOTE
· Patient complaining of left shoulder pain since admission  · Further conversation with son reports that shoulder pain started approximately a week prior  · XR negative for acute traumatic injury  Decreased ROM and positive Neers test    · Multimodal pain regimen  · Follow-up with orthopedics as an outpatient as needed

## 2022-07-29 NOTE — ASSESSMENT & PLAN NOTE
Lab Results   Component Value Date    EGFR 82 07/29/2022    EGFR 81 07/28/2022    EGFR 80 07/27/2022    CREATININE 0 56 (L) 07/29/2022    CREATININE 0 58 (L) 07/28/2022    CREATININE 0 62 07/27/2022        - Patient with history of CKD stage 3 and baseline creatinine around 0 9  Patient follows with [facility] as outpatient  - Patient also known to 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   - No evidence of acute kidney injury at this time  - Monitor volume status and renal function with BMP  - Avoid nephrotoxic medications and hypotension   - Outpatient follow-up per routine

## 2022-07-29 NOTE — PROGRESS NOTES
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, possible underlying pulmonary disease contributing   -admission [email or web address] on 7/24/2022-->124-->127-->125-->128-->134 7/28-->135 today, appropriate correction  -Baseline sodium appears to CP [id number] FVB  -workup: serum osmolality 271, urine osmolality 295, urine sodium 30  -check uric acid 5 2, TSH normal (2 8), random cortisol 16 3   -Imaging:  No overt signs of malignancy  + diffuse honeycombing throughout the lungs bilaterally with bronchiectatic and fibrotic changes  +1 9 cm exophytic right upper pole renal cyst   -No acute indication for Hypertonic saline (HTS) at this time    -continue salt tablets 2gm TID  -lasix changed to Torsemide 20 mg daily 7/28/22 with improvement in lower extremity edema today  Continue Torsemide  -continue Fluid restriction of 1 2L/day  -avoid use of NSAIDs  -Strict I/O  -Check BMP in am  -Avoid overcorrection >8 meq/24 hrs     -Baseline creatinine 0 6-  0 8    Most recent creatinine 0 56  -continue to monitor closely  -will d/w Dr Guidry     CKD II/solitary right kidney:  -s/p left nephrectomy 9/'21 for benign disease  -baseline creatinine appears 0 6-0 8  -follows with [facility], last seen 9/21/21  -avoid nephrotoxins, NSAIDs, hypotension, IV contrast if possible  -will require outpatient follow-up with established nephrologist at hospital discharge     Hypertension/volume status:  -BP adequate  -clinically, examines euvolemic  -home medications:  Amlodipine 2 5 mg daily, diltiazem 120 mg q 6 hours, Lasix 20 mg b i d   -current medications: diltiazem 120 mg q 6 hours, torsemide 20 mg daily  -avoid hypotension, maintain MAP >65  -adjust hold parameters on antihypertensives for SBP <130  -continue to monitor     Chronic diastolic congestive heart failure:  -compensated  -home diuretics:  Lasix 20 mg b i d   -Lasix changed to torsemide  -volume status euvolemic  -management per primary team     Fall with closed T3 which compression fracture and multiple left rib fractures:  -non operative management of T3 fracture per neurosurgery   fx stable on plain films  -hx chronic T4, T9 fx, stable  -TLSO brace  - PT/OT  -pain control   Recommend avoiding NSAIDs in setting of significant hyponatremia  -management per trauma team    SUBJECTIVE:  Patient awake, alert without complaints  Serum sodium continues to improve at 135 this am , appropriate correction  Adequate urine output with wt decreased 6 kg  No BLE edema  Denies dyspnea  VSS    A complete review of systems was performed  Pertinent positives and negatives noted in the HPI  Otherwise the review of systems was negative  OBJECTIVE:  Current Weight: Weight - Scale: 79 9 kg (176 lb 2 4 oz)  Vitals:    07/29/22 0600 07/29/22 0724 07/29/22 1158 07/29/22 1201   BP:  117/56 98/67 98/67   BP Location:  Left arm     Pulse:  78  84   Resp:  16     Temp:  97 9 °F (36 6 °C)     TempSrc:  Oral     SpO2:  93%  95%   Weight: 79 9 kg (176 lb 2 4 oz)      Height:           Intake/Output Summary (Last 24 hours) at 7/29/2022 1202  Last data filed at 7/29/2022 0523  Gross per 24 hour   Intake 240 ml   Output 950 ml   Net -710 ml     General:  Awake, alert, appears comfortable and in no acute distress  Nontoxic  Skin:  No rash, warm, good skin turgor   Eyes:  PERRL, EOMI, sclerae nonicteric   no periorbital edema   ENT:  Moist mucous membranes  Neck:  Trachea midline, symmetric    No JVD   Chest:  Clear to auscultation bilaterally without wheezes, crackles or rhonchi  CVS:  Regular rate and rhythm without murmur, gallop or rub  S1 and S2 identified and normal   No S3, S4    Abdomen:  Soft, nontender, nondistended without masses  Normal bowel sounds x 4 quadrants  No bruit  Extremities:  Warm, pink, motor and sensory intact and well perfused  No cyanosis, pallor  No BLE edema  Neuro:  Awake, alert, oriented x3  Grossly intact  Psych:  Appropriate affect  Mentating appropriately    Normal mental status exam      Medications:    Current Facility-Administered Medications:     acetaminophen (TYLENOL) tablet 975 mg, 975 mg, Oral, Q8H [address], Ruiz Giordano PA-C, 975 mg at 07/29/22 0559    atorvastatin (LIPITOR) tablet 20 mg, 20 mg, Oral, Daily, Ruiz Giordano PA-C, 20 mg at 07/29/22 0800    cyanocobalamin injection 1,000 mcg, 1,000 mcg, Intramuscular, Q30 Days, Ruiz Giordano PA-C, 1,000 mcg at 07/23/22 1744    Diclofenac Sodium (VOLTAREN) 1 % topical gel 2 g, 2 g, Topical, 4x Daily, Ruiz Giordano PA-C, 2 g at 07/29/22 1158    diltiazem (CARDIZEM) tablet 120 mg, 120 mg, Oral, Q6H EVA, Karena Gonzalez PA-C, 120 mg at 07/29/22 0559    docusate sodium (COLACE) capsule 100 mg, 100 mg, Oral, BID, Ruiz Giordano PA-C, 100 mg at 07/29/22 0800    guaiFENesin (MUCINEX) 12 hr tablet 600 mg, 600 mg, Oral, Q12H [address], Yuly Brain, CRNP, 600 mg at 07/29/22 0800    heparin (porcine) subcutaneous injection 5,000 Units, 5,000 Units, Subcutaneous, Q8H [address], Ruiz Giordano PA-C, 5,000 Units at 07/29/22 0559    HYDROmorphone HCl (DILAUDID) injection 0 2 mg, 0 2 mg, Intravenous, Q4H PRN, Norm Quiroga PA-C    levothyroxine tablet 50 mcg, 50 mcg, Oral, Daily, Ruiz Giordano PA-C, 50 mcg at 07/29/22 0559    lidocaine (LIDODERM) 5 % patch 2 patch, 2 patch, Topical, Daily, Norm Quiroga PA-C, 2 patch at 07/29/22 0800    memantine (NAMENDA) tablet 10 mg, 10 mg, Oral, BID, Ruiz Giordano PA-C, 10 mg at 07/29/22 0800   methocarbamol (ROBAXIN) tablet 250 mg, 250 mg, Oral, Q6H [address], Ruiz Giordano PA-C, 250 mg at 07/29/22 1158    naloxone (NARCAN) 0 04 mg/mL syringe 0 04 mg, 0 04 mg, Intravenous, Q1MIN PRN, Pedro Corey PA-C    ondansetron (ZOFRAN) injection 4 mg, 4 mg, Intravenous, Q4H PRN, Pedro Corey PA-C, 4 mg at 07/24/22 1636    oxyCODONE (ROXICODONE) IR tablet 2 5 mg, 2 5 mg, Oral, Q4H PRN, Pedro Corey PA-C, 2 5 mg at 07/28/22 1546    oxyCODONE (ROXICODONE) IR tablet 5 mg, 5 mg, Oral, Q4H PRN, Pedro Corey PA-C, 5 mg at 07/27/22 0002    pantoprazole (PROTONIX) EC tablet 40 mg, 40 mg, Oral, Daily, Ruiz Giordano PA-C, 40 mg at 07/29/22 0800    polyethylene glycol (MIRALAX) packet 17 g, 17 g, Oral, Daily PRN, Pedro Corey PA-C, 17 g at 07/23/22 1744    senna (SENOKOT) tablet 17 2 mg, 2 tablet, Oral, Daily, Ruiz Giordano PA-C, 17 2 mg at 07/29/22 0800    sodium chloride tablet 2 g, 2 g, Oral, TID With Meals, Shilpa Mora PA-C, 2 g at 07/29/22 1158    torsemide (DEMADEX) tablet 20 mg, 20 mg, Oral, Daily, Jono Winters MD, 20 mg at 07/29/22 0800    Laboratory Results:  Results from last 7 days   Lab Units 07/29/22  0520 07/28/22  0536 07/27/22  1825 07/27/22  0841 07/27/22  0524 07/26/22  1449 07/26/22  0526 07/24/22  1755 07/24/22  0515 07/23/22  1205   WBC Thousand/uL  --   --   --   --  7 42  --   --   --  8 96 11 71*   HEMOGLOBIN g/dL  --   --   --   --  10 7*  --   --   --  10 7* 12 9   HEMATOCRIT %  --   --   --   --  32 2*  --   --   --  32 5* 38 3   PLATELETS Thousands/uL  --   --   --   --  271  --   --   --  292 286   SODIUM mmol/L 135 134* 131* 128* 127* 126* 125*   < > 127* 127*   POTASSIUM mmol/L 4 2 3 8 4 1 4 2 4 3 4 3 4 2   < > 3 9 4 2   CHLORIDE mmol/L 95* 95* 96 93* 94* 92* 91*   < > 91* 89*   CO2 mmol/L 35* 34* 32 31 29 30 31   < > 32 29   BUN mg/dL 13 12 11 9 10 12 14   < > 11 7   CREATININE mg/dL 0 56* 0 58* 0 62 0 63 0 61 0 72 0 70   < > 0 68 0 48*   CALCIUM mg/dL 9 0 9 1 8 7 8 9 8 8 8 5 8 7   < > 8 7 9 2    < > = values in this interval not displayed  I have personally reviewed the blood work as stated above and in my note  I have personally reviewed internal Medicine, co-consultants and previous nephrology notes

## 2022-07-29 NOTE — PLAN OF CARE
Problem: PHYSICAL THERAPY ADULT  Goal: Performs mobility at highest level of function for planned discharge setting  See evaluation for individualized goals  Description: Treatment/Interventions: Functional transfer training, LE strengthening/ROM, Therapeutic exercise, Endurance training, Patient/family training, Equipment eval/education, Bed mobility, Gait training  Equipment Recommended: Merlin Sovereign       See flowsheet documentation for full assessment, interventions and recommendations  Outcome: Progressing  Note: Prognosis: Fair  Problem List: Decreased strength, Decreased range of motion, Decreased endurance, Impaired balance, Decreased mobility, Decreased safety awareness, Orthopedic restrictions, Pain  Assessment: pt began tx session seated OOB in the recliner and was agreeable to participate in Pt intervention  progress was noted this tx session as pt was able to perform multiple STS to and from RW w/ a decrease in level of assistance required /s w/ VC's for hand placement while ascending to RW and descending back into recliner   pt continues to be limited with functional mobility, activity tolerance and ambulation distance secondary to fatigue  pt required several therapeutic rest breaks throughout tx session and a 5 minute seated rest in between ambulation trials  pt was able to complete TE activities seated in recliner with no increases in pain and good from throughout TE activities  pt would benefit from continued skilled PT intervention in order to maximize pt functional independence and safety with all OOB mobility  post tx pt in recliner with call bell and all pt needs met        PT Discharge Recommendation: Post acute rehabilitation services (vs return to facility w/ PT services)    See flowsheet documentation for full assessment

## 2022-07-30 VITALS
TEMPERATURE: 98 F | OXYGEN SATURATION: 92 % | WEIGHT: 179.4 LBS | HEART RATE: 81 BPM | SYSTOLIC BLOOD PRESSURE: 127 MMHG | DIASTOLIC BLOOD PRESSURE: 64 MMHG | HEIGHT: 62 IN | RESPIRATION RATE: 16 BRPM | BODY MASS INDEX: 33.01 KG/M2

## 2022-07-30 LAB
ANION GAP SERPL CALCULATED.3IONS-SCNC: 3 MMOL/L (ref 4–13)
BUN SERPL-MCNC: 16 MG/DL (ref 5–25)
CALCIUM SERPL-MCNC: 9.1 MG/DL (ref 8.4–10.2)
CHLORIDE SERPL-SCNC: 96 MMOL/L (ref 96–108)
CO2 SERPL-SCNC: 37 MMOL/L (ref 21–32)
CREAT SERPL-MCNC: 0.56 MG/DL (ref 0.6–1.3)
GFR SERPL CREATININE-BSD FRML MDRD: 82 ML/MIN/1.73SQ M
GLUCOSE SERPL-MCNC: 97 MG/DL (ref 65–140)
POTASSIUM SERPL-SCNC: 4 MMOL/L (ref 3.5–5.3)
SODIUM SERPL-SCNC: 136 MMOL/L (ref 135–147)

## 2022-07-30 PROCEDURE — 80048 BASIC METABOLIC PNL TOTAL CA: CPT | Performed by: PHYSICIAN ASSISTANT

## 2022-07-30 PROCEDURE — 99232 SBSQ HOSP IP/OBS MODERATE 35: CPT | Performed by: INTERNAL MEDICINE

## 2022-07-30 PROCEDURE — NC001 PR NO CHARGE: Performed by: SURGERY

## 2022-07-30 PROCEDURE — 99238 HOSP IP/OBS DSCHRG MGMT 30/<: CPT | Performed by: NURSE PRACTITIONER

## 2022-07-30 RX ORDER — OXYCODONE HYDROCHLORIDE 5 MG/1
TABLET ORAL
Qty: 20 TABLET | Refills: 0 | Status: SHIPPED | OUTPATIENT
Start: 2022-07-30

## 2022-07-30 RX ORDER — LIDOCAINE 50 MG/G
2 PATCH TOPICAL DAILY
Refills: 0
Start: 2022-07-31

## 2022-07-30 RX ORDER — GUAIFENESIN 600 MG/1
600 TABLET, EXTENDED RELEASE ORAL EVERY 12 HOURS SCHEDULED
Refills: 0
Start: 2022-07-30

## 2022-07-30 RX ORDER — TORSEMIDE 20 MG/1
20 TABLET ORAL DAILY
Refills: 0
Start: 2022-07-31

## 2022-07-30 RX ORDER — METHOCARBAMOL 500 MG/1
250 TABLET, FILM COATED ORAL EVERY 6 HOURS PRN
Refills: 0
Start: 2022-07-30

## 2022-07-30 RX ORDER — DOCUSATE SODIUM 100 MG/1
100 CAPSULE, LIQUID FILLED ORAL 2 TIMES DAILY
Refills: 0
Start: 2022-07-30

## 2022-07-30 RX ORDER — SODIUM CHLORIDE 1000 MG
2 TABLET, SOLUBLE MISCELLANEOUS 2 TIMES DAILY WITH MEALS
Refills: 0
Start: 2022-07-30 | End: 2022-09-28 | Stop reason: SDUPTHER

## 2022-07-30 RX ADMIN — DOCUSATE SODIUM 100 MG: 100 CAPSULE, LIQUID FILLED ORAL at 09:13

## 2022-07-30 RX ADMIN — ACETAMINOPHEN 975 MG: 325 TABLET ORAL at 05:48

## 2022-07-30 RX ADMIN — DICLOFENAC SODIUM TOPICAL GEL, 1%, 2 G: 10 GEL TOPICAL at 09:15

## 2022-07-30 RX ADMIN — LIDOCAINE 5% 2 PATCH: 700 PATCH TOPICAL at 09:15

## 2022-07-30 RX ADMIN — HEPARIN SODIUM 5000 UNITS: 5000 INJECTION INTRAVENOUS; SUBCUTANEOUS at 13:04

## 2022-07-30 RX ADMIN — PANTOPRAZOLE SODIUM 40 MG: 40 TABLET, DELAYED RELEASE ORAL at 09:14

## 2022-07-30 RX ADMIN — MEMANTINE 10 MG: 10 TABLET ORAL at 09:14

## 2022-07-30 RX ADMIN — SODIUM CHLORIDE 2 G: 1 TABLET ORAL at 09:14

## 2022-07-30 RX ADMIN — SENNOSIDES 17.2 MG: 8.6 TABLET, FILM COATED ORAL at 09:13

## 2022-07-30 RX ADMIN — HEPARIN SODIUM 5000 UNITS: 5000 INJECTION INTRAVENOUS; SUBCUTANEOUS at 05:51

## 2022-07-30 RX ADMIN — ATORVASTATIN CALCIUM 20 MG: 20 TABLET, FILM COATED ORAL at 09:13

## 2022-07-30 RX ADMIN — LEVOTHYROXINE SODIUM 50 MCG: 50 TABLET ORAL at 05:51

## 2022-07-30 RX ADMIN — GUAIFENESIN 600 MG: 600 TABLET ORAL at 09:14

## 2022-07-30 RX ADMIN — METHOCARBAMOL 250 MG: 500 TABLET ORAL at 05:51

## 2022-07-30 RX ADMIN — METHOCARBAMOL 250 MG: 500 TABLET ORAL at 01:38

## 2022-07-30 RX ADMIN — DILTIAZEM HYDROCHLORIDE 120 MG: 60 TABLET, FILM COATED ORAL at 05:54

## 2022-07-30 RX ADMIN — ACETAMINOPHEN 975 MG: 325 TABLET ORAL at 13:03

## 2022-07-30 RX ADMIN — TORSEMIDE 20 MG: 20 TABLET ORAL at 09:13

## 2022-07-30 RX ADMIN — DICLOFENAC SODIUM TOPICAL GEL, 1%, 2 G: 10 GEL TOPICAL at 13:04

## 2022-07-30 RX ADMIN — METHOCARBAMOL 250 MG: 500 TABLET ORAL at 13:04

## 2022-07-30 NOTE — PROGRESS NOTES
[facility]  Progress Note - James Gr 1933, 80 y o  female MRN: [id number]  Unit/Bed#: S -01 Encounter: [id number]  Primary Care Provider: Estiven Muniz MD   Date and time admitted to hospital: 7/23/2022 10:43 AM    Fall  Assessment & Plan  - Status post fall with the below noted injuries  - Fall precautions  - Geriatric Medicine consultation for evaluation, medication review and recommendations   - PT and OT evaluation and treatment as indicated  - Case Management consultation for disposition planning  * Closed fracture of multiple ribs of left side  Assessment & Plan  - Multiple left-sided rib fractures (5 & 7), present on admission   - Continue rib fracture protocol   - Continue to encourage incentive spirometer use and adequate pulmonary hygiene  Currently pulling 500-750 mL on I S  Flutter valve also given to patient  - PIC score is 8   - Continue multimodal analgesic regimen  Appreciate APS evaluation and recommendations   - Supplemental oxygen via nasal cannula as needed to maintain saturations greater than or equal to 94%  - Repeat chest x-ray on 7/24/2022 stable  - PT and OT evaluation and treatment as indicated  - Outpatient follow-up in the trauma clinic for re-evaluation in approximately 2 weeks  Closed wedge compression fracture of T3 vertebra (HCC)  Assessment & Plan  - Mild T3 compression fracture, present on admission, with progression when compared to prior imaging   - Appreciate Neurosurgery evaluation and recommendations  Non-operative management recommended  - Bracing: Initially a TLSO brace was ordered  After discussion with Neurosurgery, bracing for this fracture will be difficult due to level of fracture  Would likely need cervical collar and TLSO brace or a  brace, but neither may cover the fracture level well per Neurosurgery  No bracing at this time  - Monitor neurovascular exam   - Multimodal analgesic regimen as needed    - PT and OT evaluation and treatment as indicated  - Outpatient follow up with Neurosurgery for re-evaluation  Acute pain of left shoulder  Assessment & Plan  · Patient complaining of left shoulder pain since admission  · Further conversation with son reports that shoulder pain started approximately a week prior  · XR negative for acute traumatic injury  Decreased ROM and positive Neers test    · Multimodal pain regimen  · Follow-up with orthopedics as an outpatient as needed  Hyponatremia  Assessment & Plan  - Significant hyponatremia, present on presentation, with sodium of 127   - Sodium down trended as low as 124 on 07/24/2022   135 today  Uptrending since initiation of 2 g salt tabs TID  - nephrology consulted and note appreciated  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  - continue Salt tabs 2 g TID  - Continue to monitor sodium with serial BMP,   - Outpatient follow-up with PCP  Hypothyroid  Assessment & Plan  - Patient with chronic history of hypothyroidism   - Continue home medication regimen   - Outpatient follow-up per routine  Chronic congestive heart failure (HCC)  Assessment & Plan  Wt Readings from Last 3 Encounters:   07/30/22 81 4 kg (179 lb 6 4 oz)   07/14/22 85 1 kg (187 lb 9 8 oz)     - Patient with chronic history of CHF without evidence of acute exacerbation   - Continue home medication regimen  - Monitor volume status   - Outpatient follow-up per routine  CKD (chronic kidney disease) stage 3, GFR 30-59 ml/min [facility])  Assessment & Plan  Lab Results   Component Value Date    EGFR 82 07/30/2022    EGFR 82 07/29/2022    EGFR 81 07/28/2022    CREATININE 0 56 (L) 07/30/2022    CREATININE 0 56 (L) 07/29/2022    CREATININE 0 58 (L) 07/28/2022        - Patient with history of CKD stage 3 and baseline creatinine around 0 9  Patient follows with [facility] as outpatient    - Patient also known to 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   - No evidence of acute kidney injury at this time  - Monitor volume status and renal function with BMP  - Avoid nephrotoxic medications and hypotension   - Outpatient follow-up per routine  HTN (hypertension)  Assessment & Plan  - Chronic history of hypertension   - home amlodipine was discontinued per Nephrology due to patient already being on a calcium channel blocker, diltiazem  - patient was also changed from Lasix to torsemide, per Nephrology  - continue to monitor I&O  - continue to monitor blood pressure  - Outpatient follow-up per routine  Disposition: Discharge to [address] [place] [address] later today  SUBJECTIVE:  Chief Complaint:  I feel good    Subjective:  Patient denies any new complaints this morning  She states that she continues to feel well and that her rib pain has been under control  She does note some continued left shoulder pain-she denies any change in pain  Patient states that she feels ready for discharge later today  OBJECTIVE:   Vitals:   Temp:  [97 7 °F (36 5 °C)-98 3 °F (36 8 °C)] 98 3 °F (36 8 °C)  HR:  [79-97] 84  Resp:  [16] 16  BP: ()/(55-73) 131/61    Intake/Output:  I/O       07/28 0701  07/29 0700 07/29 0701  07/30 0700 07/30 0701  07/31 0700    P  O  840 480     Total Intake(mL/kg) 840 (10 5) 480 (5 9)     Urine (mL/kg/hr) 950 (0 5) 300 (0 2)     Stool  0     Total Output 950 300     Net -110 +180            Unmeasured Urine Occurrence 1 x 2 x     Unmeasured Stool Occurrence  2 x          Nutrition: Diet Regular; Regular House; Fluid Restriction 1200 ML  GI Proph/Bowel Reg:  Colace  VTE Prophylaxis:Sequential compression device (Venodyne)  and Heparin     Physical Exam:   GENERAL APPEARANCE:  No acute distress  NEURO:  GCS 15  Light touch sensation intact throughout  HEENT:  Normocephalic, atraumatic  Neck supple  CV: RRR, +2 radial dorsalis pedis pulses, bilaterally  LUNGS:  Lungs clear to auscultation, bilaterally  Inspiring 500 mL on IS    GI:  Abdomen is soft nontender  :  Pelvis stable  MSK:  No deformities  SKIN:  Warm, dry  Invasive Devices  Report    Peripheral Intravenous Line  Duration           Peripheral IV 07/28/22 Distal;Dorsal (posterior); Right Forearm 2 days          Drain  Duration           External Urinary Catheter 6 days                 PIC Score  PIC Pain Score: 3 (7/30/2022 12:00 AM)  PIC Incentive Spirometry Score: Below alert volume (7/30/2022 12:00 AM)  PIC Cough Description: Weak (7/30/2022 12:00 AM)  PIC Total Score: 7 (7/30/2022 12:00 AM)       If the Total PIC Score </=5, did you consult APS and evaluate patient for further intervention?: yes      Pain:    Incentive Spirometry  Cough  3 = Controlled  4 = Above goal volume 3 = Strong  2 = Moderate  3 = Goal to alert volume 2 = Weak  1 = Severe  2 = Below alert volume 1 = Absent     1 = Unable to perform IS         Lab Results:   Results: I have personally reviewed all pertinent laboratory/tests results, BMP/CMP:   Lab Results   Component Value Date    SODIUM 136 07/30/2022    K 4 0 07/30/2022    CL 96 07/30/2022    CO2 37 (H) 07/30/2022    BUN 16 07/30/2022    CREATININE 0 56 (L) 07/30/2022    CALCIUM 9 1 07/30/2022    EGFR 82 07/30/2022    and CBC: No results found for: WBC, HGB, HCT, MCV, PLT, ADJUSTEDWBC, MCH, MCHC, RDW, MPV, NRBC  Imaging/EKG Studies: I have personally reviewed pertinent reports        Other Studies: none

## 2022-07-30 NOTE — ASSESSMENT & PLAN NOTE
Lab Results   Component Value Date    EGFR 82 07/30/2022    EGFR 82 07/29/2022    EGFR 81 07/28/2022    CREATININE 0 56 (L) 07/30/2022    CREATININE 0 56 (L) 07/29/2022    CREATININE 0 58 (L) 07/28/2022        - Patient with history of CKD stage 3 and baseline creatinine around 0 9  Patient follows with [facility] as outpatient  - Patient also known to 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   - No evidence of acute kidney injury at this time  - Monitor volume status and renal function with BMP  - Avoid nephrotoxic medications and hypotension   - Outpatient follow-up per routine

## 2022-07-30 NOTE — ASSESSMENT & PLAN NOTE
Wt Readings from Last 3 Encounters:   07/30/22 81 4 kg (179 lb 6 4 oz)   07/14/22 85 1 kg (187 lb 9 8 oz)     - Patient with chronic history of CHF without evidence of acute exacerbation   - Continue home medication regimen  - Monitor volume status   - Outpatient follow-up per routine

## 2022-07-30 NOTE — PLAN OF CARE
Problem: MOBILITY - ADULT  Goal: Maintain or return to baseline ADL function  Description: INTERVENTIONS:  -  Assess patient's ability to carry out ADLs; assess patient's baseline for ADL function and identify physical deficits which impact ability to perform ADLs (bathing, care of mouth/teeth, toileting, grooming, dressing, etc )  - Assess/evaluate cause of self-care deficits   - Assess range of motion  - Assess patient's mobility; develop plan if impaired  - Assess patient's need for assistive devices and provide as appropriate  - Encourage maximum independence but intervene and supervise when necessary  - Involve family in performance of ADLs  - Assess for home care needs following discharge   - Consider OT consult to assist with ADL evaluation and planning for discharge  - Provide patient education as appropriate  Outcome: Progressing  Goal: Maintains/Returns to pre admission functional level  Description: INTERVENTIONS:  - Perform BMAT or MOVE assessment daily    - Set and communicate daily mobility goal to care team and patient/family/caregiver     - Collaborate with rehabilitation services on mobility goals if consulted  - Out of bed for toileting  - Record patient progress and toleration of activity level   Outcome: Progressing     Problem: PAIN - ADULT  Goal: Verbalizes/displays adequate comfort level or baseline comfort level  Description: Interventions:  - Encourage patient to monitor pain and request assistance  - Assess pain using appropriate pain scale  - Administer analgesics based on type and severity of pain and evaluate response  - Implement non-pharmacological measures as appropriate and evaluate response  - Consider cultural and social influences on pain and pain management  - Notify physician/advanced practitioner if interventions unsuccessful or patient reports new pain  Outcome: Progressing     Problem: INFECTION - ADULT  Goal: Absence or prevention of progression during hospitalization  Description: INTERVENTIONS:  - Assess and monitor for signs and symptoms of infection  - Monitor lab/diagnostic results  - Monitor all insertion sites, i e  indwelling lines, tubes, and drains  - Monitor endotracheal if appropriate and nasal secretions for changes in amount and color  - [place] appropriate cooling/warming therapies per order  - Administer medications as ordered  - Instruct and encourage patient and family to use good hand hygiene technique  - Identify and instruct in appropriate isolation precautions for identified infection/condition  Outcome: Progressing  Goal: Absence of fever/infection during neutropenic period  Description: INTERVENTIONS:  - Monitor WBC    Outcome: Progressing     Problem: SAFETY ADULT  Goal: Maintain or return to baseline ADL function  Description: INTERVENTIONS:  -  Assess patient's ability to carry out ADLs; assess patient's baseline for ADL function and identify physical deficits which impact ability to perform ADLs (bathing, care of mouth/teeth, toileting, grooming, dressing, etc )  - Assess/evaluate cause of self-care deficits   - Assess range of motion  - Assess patient's mobility; develop plan if impaired  - Assess patient's need for assistive devices and provide as appropriate  - Encourage maximum independence but intervene and supervise when necessary  - Involve family in performance of ADLs  - Assess for home care needs following discharge   - Consider OT consult to assist with ADL evaluation and planning for discharge  - Provide patient education as appropriate  Outcome: Progressing  Goal: Maintains/Returns to pre admission functional level  Description: INTERVENTIONS:  - Perform BMAT or MOVE assessment daily    - Set and communicate daily mobility goal to care team and patient/family/caregiver     - Collaborate with rehabilitation services on mobility goals if consulted  - Out of bed for toileting  - Record patient progress and toleration of activity level   Outcome: Progressing  Goal: Patient will remain free of falls  Description: INTERVENTIONS:  - Educate patient/family on patient safety including physical limitations  - Instruct patient to call for assistance with activity   - Consult OT/PT to assist with strengthening/mobility   - Keep Call bell within reach  - Keep bed low and locked with side rails adjusted as appropriate  - Keep care items and personal belongings within reach  - Initiate and maintain comfort rounds  - Make Fall Risk Sign visible to staff  - Apply yellow socks and bracelet for high fall risk patients  - Consider moving patient to room near nurses station  Outcome: Progressing     Problem: DISCHARGE PLANNING  Goal: Discharge to home or other facility with appropriate resources  Description: INTERVENTIONS:  - Identify barriers to discharge w/patient and caregiver  - Arrange for needed discharge resources and transportation as appropriate  - Identify discharge learning needs (meds, wound care, etc )  - Arrange for interpretive services to assist at discharge as needed  - Refer to Case Management Department for coordinating discharge planning if the patient needs post-hospital services based on physician/advanced practitioner order or complex needs related to functional status, cognitive ability, or social support system  Outcome: Progressing     Problem: Potential for Falls  Goal: Patient will remain free of falls  Description: INTERVENTIONS:  - Educate patient/family on patient safety including physical limitations  - Instruct patient to call for assistance with activity   - Consult OT/PT to assist with strengthening/mobility   - Keep Call bell within reach  - Keep bed low and locked with side rails adjusted as appropriate  - Keep care items and personal belongings within reach  - Initiate and maintain comfort rounds  - Make Fall Risk Sign visible to staff  - Apply yellow socks and bracelet for high fall risk patients  - Consider moving patient to room near nurses station  Outcome: Progressing     Problem: Prexisting or High Potential for Compromised Skin Integrity  Goal: Skin integrity is maintained or improved  Description: INTERVENTIONS:  - Identify patients at risk for skin breakdown  - Assess and monitor skin integrity  - Assess and monitor nutrition and hydration status  - Monitor labs   - Assess for incontinence   - Turn and reposition patient  - Assist with mobility/ambulation  - Relieve pressure over bony prominences  - Avoid friction and shearing  - Provide appropriate hygiene as needed including keeping skin clean and dry  - Evaluate need for skin moisturizer/barrier cream  - Collaborate with interdisciplinary team   - Patient/family teaching  - Consider wound care consult   Outcome: Progressing     Problem: Nutrition/Hydration-ADULT  Goal: Nutrient/Hydration intake appropriate for improving, restoring or maintaining nutritional needs  Description: Monitor and assess patient's nutrition/hydration status for malnutrition  Collaborate with interdisciplinary team and initiate plan and interventions as ordered  Monitor patient's weight and dietary intake as ordered or per policy  Utilize nutrition screening tool and intervene as necessary  Determine patient's food preferences and provide high-protein, high-caloric foods as appropriate       INTERVENTIONS:  - Monitor oral intake, urinary output, labs, and treatment plans  - Assess nutrition and hydration status and recommend course of action  - Evaluate amount of meals eaten  - Assist patient with eating if necessary   - Allow adequate time for meals  - Recommend/ encourage appropriate diets, oral nutritional supplements, and vitamin/mineral supplements  - Order, calculate, and assess calorie counts as needed  - Recommend, monitor, and adjust tube feedings and TPN/PPN based on assessed needs  - Assess need for intravenous fluids  - Provide specific nutrition/hydration education as appropriate  - Include patient/family/caregiver in decisions related to nutrition  Outcome: Progressing

## 2022-07-30 NOTE — CASE MANAGEMENT
Case Management Discharge Planning Note    Patient name Catina Farias  Location S /S -01 MRN [id number]  : 1933 Date 2022       Current Admission Date: 2022  Current Admission Diagnosis:Closed fracture of multiple ribs of left side   Patient Active Problem List    Diagnosis Date Noted    Acute pain of left shoulder 2022    Fall 2022    Closed fracture of multiple ribs of left side 2022    Closed wedge compression fracture of T3 vertebra ([facility] [address] ) 2022    HTN (hypertension) 2022    CKD (chronic kidney disease) stage 3, GFR 30-59 ml/min ([facility]) 2022    Chronic congestive heart failure ([facility] [address] ) 2022    Hypothyroid 2022    Hyponatremia 2022      LOS (days): 7  Geometric Mean LOS (GMLOS) (days): 3 10  Days to GMLOS:-3 8     OBJECTIVE:  Risk of Unplanned Readmission Score: 12 3      Current admission status: Inpatient   Preferred Pharmacy: No Pharmacies Listed  Primary Care Provider: Thomas Henry MD    Primary Insurance: [facility]  Secondary Insurance:     DISCHARGE DETAILS:  CM updated per trauma, patient is medically stable for discharge today  CM updated per SLETS, patient's transport time is 1400 with [facility]  CM spoke with Adriana Monge with [facility] unable to provide oxygen for transport  [facility] working on a new  time  CM updated per SLETS, patient's transport time is 1400 BLS with [facility] due to oxygen need  [facility] unable to transport today  [facility] unable to provide oxygen for discharge  CM updated nursing, trauma and receiving facility of transport time  CM spoke with patient's son Moni Alberts on the phone, [phone]  CM introduced self and role  Patient's son updated per trauma, patient is medically stable for discharge today  Son updated CM did speak with Ronnie Fontenot this morning  Facility able to accept back today  Patient's son updated transport time is set up for 1400   Patient will have PT/OT scripts and scripts for any new medications sent with her to facility  CM answered all questions/concerns at this time

## 2022-07-30 NOTE — PLAN OF CARE
Problem: MOBILITY - ADULT  Goal: Maintain or return to baseline ADL function  Description: INTERVENTIONS:  -  Assess patient's ability to carry out ADLs; assess patient's baseline for ADL function and identify physical deficits which impact ability to perform ADLs (bathing, care of mouth/teeth, toileting, grooming, dressing, etc )  - Assess/evaluate cause of self-care deficits   - Assess range of motion  - Assess patient's mobility; develop plan if impaired  - Assess patient's need for assistive devices and provide as appropriate  - Encourage maximum independence but intervene and supervise when necessary  - Involve family in performance of ADLs  - Assess for home care needs following discharge   - Consider OT consult to assist with ADL evaluation and planning for discharge  - Provide patient education as appropriate  7/30/2022 1343 by Shira Preston RN  Outcome: Adequate for Discharge  7/30/2022 1343 by Shira Preston RN  Outcome: Adequate for Discharge  Goal: Maintains/Returns to pre admission functional level  Description: INTERVENTIONS:  - Perform BMAT or MOVE assessment daily    - Set and communicate daily mobility goal to care team and patient/family/caregiver     - Collaborate with rehabilitation services on mobility goals if consulted    - Out of bed for toileting  - Record patient progress and toleration of activity level   7/30/2022 1343 by Shira Preston RN  Outcome: Adequate for Discharge  7/30/2022 1343 by Shira Preston RN  Outcome: Adequate for Discharge     Problem: PAIN - ADULT  Goal: Verbalizes/displays adequate comfort level or baseline comfort level  Description: Interventions:  - Encourage patient to monitor pain and request assistance  - Assess pain using appropriate pain scale  - Administer analgesics based on type and severity of pain and evaluate response  - Implement non-pharmacological measures as appropriate and evaluate response  - Consider cultural and social influences on pain and pain management  - Notify physician/advanced practitioner if interventions unsuccessful or patient reports new pain  7/30/2022 1343 by Shira Preston RN  Outcome: Adequate for Discharge  7/30/2022 1343 by Shira Preston RN  Outcome: Adequate for Discharge     Problem: INFECTION - ADULT  Goal: Absence or prevention of progression during hospitalization  Description: INTERVENTIONS:  - Assess and monitor for signs and symptoms of infection  - Monitor lab/diagnostic results  - Monitor all insertion sites, i e  indwelling lines, tubes, and drains  - Monitor endotracheal if appropriate and nasal secretions for changes in amount and color  - [place] appropriate cooling/warming therapies per order  - Administer medications as ordered  - Instruct and encourage patient and family to use good hand hygiene technique  - Identify and instruct in appropriate isolation precautions for identified infection/condition  7/30/2022 1343 by Shira Preston RN  Outcome: Adequate for Discharge  7/30/2022 1343 by Shira Preston RN  Outcome: Adequate for Discharge  Goal: Absence of fever/infection during neutropenic period  Description: INTERVENTIONS:  - Monitor WBC    7/30/2022 1343 by Shira Preston RN  Outcome: Adequate for Discharge  7/30/2022 1343 by Shira Preston RN  Outcome: Adequate for Discharge     Problem: SAFETY ADULT  Goal: Maintain or return to baseline ADL function  Description: INTERVENTIONS:  -  Assess patient's ability to carry out ADLs; assess patient's baseline for ADL function and identify physical deficits which impact ability to perform ADLs (bathing, care of mouth/teeth, toileting, grooming, dressing, etc )  - Assess/evaluate cause of self-care deficits   - Assess range of motion  - Assess patient's mobility; develop plan if impaired  - Assess patient's need for assistive devices and provide as appropriate  - Encourage maximum independence but intervene and supervise when necessary  - Involve family in performance of ADLs  - Assess for home care needs following discharge   - Consider OT consult to assist with ADL evaluation and planning for discharge  - Provide patient education as appropriate  7/30/2022 1343 by Julia Norton RN  Outcome: Adequate for Discharge  7/30/2022 1343 by Julia Norton RN  Outcome: Adequate for Discharge  Goal: Maintains/Returns to pre admission functional level  Description: INTERVENTIONS:  - Perform BMAT or MOVE assessment daily    - Set and communicate daily mobility goal to care team and patient/family/caregiver     - Collaborate with rehabilitation services on mobility goals if consulted    - Out of bed for toileting  - Record patient progress and toleration of activity level   7/30/2022 1343 by Julia Norton RN  Outcome: Adequate for Discharge  7/30/2022 1343 by Julia Norton RN  Outcome: Adequate for Discharge  Goal: Patient will remain free of falls  Description: INTERVENTIONS:  - Educate patient/family on patient safety including physical limitations  - Instruct patient to call for assistance with activity   - Consult OT/PT to assist with strengthening/mobility   - Keep Call bell within reach  - Keep bed low and locked with side rails adjusted as appropriate  - Keep care items and personal belongings within reach  - Initiate and maintain comfort rounds  - Make Fall Risk Sign visible to staff    - Apply yellow socks and bracelet for high fall risk patients  - Consider moving patient to room near nurses station  7/30/2022 1343 by Julia Norton RN  Outcome: Adequate for Discharge  7/30/2022 1343 by Julia Norton RN  Outcome: Adequate for Discharge     Problem: DISCHARGE PLANNING  Goal: Discharge to home or other facility with appropriate resources  Description: INTERVENTIONS:  - Identify barriers to discharge w/patient and caregiver  - Arrange for needed discharge resources and transportation as appropriate  - Identify discharge learning needs (meds, wound care, etc )  - Arrange for interpretive services to assist at discharge as needed  - Refer to Case Management Department for coordinating discharge planning if the patient needs post-hospital services based on physician/advanced practitioner order or complex needs related to functional status, cognitive ability, or social support system  7/30/2022 1343 by Darnell Kuo RN  Outcome: Adequate for Discharge  7/30/2022 1343 by Darnell Kuo RN  Outcome: Adequate for Discharge     Problem: Potential for Falls  Goal: Patient will remain free of falls  Description: INTERVENTIONS:  - Educate patient/family on patient safety including physical limitations  - Instruct patient to call for assistance with activity   - Consult OT/PT to assist with strengthening/mobility   - Keep Call bell within reach  - Keep bed low and locked with side rails adjusted as appropriate  - Keep care items and personal belongings within reach  - Initiate and maintain comfort rounds  - Make Fall Risk Sign visible to staff    - Apply yellow socks and bracelet for high fall risk patients  - Consider moving patient to room near nurses station  7/30/2022 1343 by Darnell Kuo RN  Outcome: Adequate for Discharge  7/30/2022 1343 by Darnell Kuo RN  Outcome: Adequate for Discharge     Problem: Prexisting or High Potential for Compromised Skin Integrity  Goal: Skin integrity is maintained or improved  Description: INTERVENTIONS:  - Identify patients at risk for skin breakdown  - Assess and monitor skin integrity  - Assess and monitor nutrition and hydration status  - Monitor labs   - Assess for incontinence   - Turn and reposition patient  - Assist with mobility/ambulation  - Relieve pressure over bony prominences  - Avoid friction and shearing  - Provide appropriate hygiene as needed including keeping skin clean and dry  - Evaluate need for skin moisturizer/barrier cream  - Collaborate with interdisciplinary team   - Patient/family teaching  - Consider wound care consult 7/30/2022 1343 by Tomas Meckel, RN  Outcome: Adequate for Discharge  7/30/2022 1343 by Tomas Meckel, RN  Outcome: Adequate for Discharge     Problem: Nutrition/Hydration-ADULT  Goal: Nutrient/Hydration intake appropriate for improving, restoring or maintaining nutritional needs  Description: Monitor and assess patient's nutrition/hydration status for malnutrition  Collaborate with interdisciplinary team and initiate plan and interventions as ordered  Monitor patient's weight and dietary intake as ordered or per policy  Utilize nutrition screening tool and intervene as necessary  Determine patient's food preferences and provide high-protein, high-caloric foods as appropriate       INTERVENTIONS:  - Monitor oral intake, urinary output, labs, and treatment plans  - Assess nutrition and hydration status and recommend course of action  - Evaluate amount of meals eaten  - Assist patient with eating if necessary   - Allow adequate time for meals  - Recommend/ encourage appropriate diets, oral nutritional supplements, and vitamin/mineral supplements  - Order, calculate, and assess calorie counts as needed  - Recommend, monitor, and adjust tube feedings and TPN/PPN based on assessed needs  - Assess need for intravenous fluids  - Provide specific nutrition/hydration education as appropriate  - Include patient/family/caregiver in decisions related to nutrition  7/30/2022 1343 by Tomas Meckel, RN  Outcome: Adequate for Discharge  7/30/2022 1343 by Tomas Meckel, RN  Outcome: Adequate for Discharge

## 2022-07-30 NOTE — PROGRESS NOTES
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 for benign disease follows with [facility], hypertension, hypothyroidism, diastolic CHF was admitted to [address] [place] [address] after presenting with fall at home resulting in multiple Left sided rib fracture and T3 compression fracture   Admission sodium was 127, decreased to 124 after which patient was started on salt tablets 1 g t i d  And also given normal saline 500 mL bolus with improvement in sodium to 127 but now again trending down to 125 on 7/26 and so nephrology consulted on 07/26            Acute hyponatremia     -suspect SIADH in the setting of pain      -admission sodium 127, trended down to 124 on 07/24 after which started on salt tablets 1 g t i d  And sodium level was still low at 125 on 07/26 after which dose of salt tablet was increased to 2 g t i d but on 07/29, sodium level improved and dose was decreased to 2 g b i d   -workup suggestive of urine osmolality 295 and urine sodium 30   Serum osmolality is 271, TSH normal at 2 8, random cortisol 16 3   Uric acid 5 2    -CT chest abdomen pelvis without contrast:  Left kidney absent, right kidney with 1 9 cm exophytic cyst   No evidence of malignancy   Bronchiectasis changes in the lungs    -sodium level today 136 mEq/liter  Lower extremity edema improving since switching to torsemide  Continue torsemide 20 mg daily, continue fluid restriction for okay to increase to 1 5 L per day as sodium level stable, will repeat BMP as outpatient in a week    Office informed to arrange for follow-up, discussed with primary team, okay for discharge from Nephrology side        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 in September 2021 as per records from [facility] from [address] [place] [address] G[place]aro creatinine around 0 6-0 9     Renal function is currently stable   Creatinine today 0 56 mg/dL  - Continue to follow-up with outpatient nephrologist at [facility]  Patient's son was not decided if he wants to follow with [facility] or [facility], office informed to arrange for follow-up           Primary hypertension   -BP stable and is at goal   Continue Cardizem at current dose which is 120 mg high 6 hours which is higher than recommended dose but has been at this dose as per records from [facility]  -also Lasix was switched to torsemide 20 mg daily on 07/27, continue at current dose  Avoid hypotension        Chronic diastolic CHF     -at home was on Lasix 20 mg b i d  And was receiving Lasix 20 mg b i d  During hospital stay but in the setting of persistent lower extremity edema was transitioned to oral torsemide 20 mg daily on 07/27   Continue torsemide 20 mg daily, lower extremity edema improving  Continue to monitor         Status post fall with T3 compression fracture and multiple left-sided rib fracture     -continue management per Trauma Service         Discussed primary team   Office informed to arrange for follow-up    SUBJECTIVE:  C/o thigh itching  No SOB or chest pain     OBJECTIVE:  Current Weight: Weight - Scale: 81 4 kg (179 lb 6 4 oz)  Vitals:    07/30/22 1100   BP: 127/64   Pulse: 81   Resp: 16   Temp: 98 °F (36 7 °C)   SpO2: 92%       Intake/Output Summary (Last 24 hours) at 7/30/2022 1126  Last data filed at 7/29/2022 1402  Gross per 24 hour   Intake 240 ml   Output 300 ml   Net -60 ml       Physical Exam  General:  Ill looking, awake  Eyes: Conjunctivae pink,  Sclera anicteric  ENT: lips and mucous membranes moist  Neck: supple   Chest: Clear to Auscultation both lungs,  no crackles, ronchus or wheezing  CVS: S1 & S2 present, normal rate, regular rhythm, no murmur    Abdomen: soft, non-tender, non-distended, Bowel sounds normoactive  Extremities: no edema of  legs  Skin: no rash  Neuro: awake, alert, oriented  Psych: Mood and affect appropriate     Medications:    Current Facility-Administered Medications:     acetaminophen (TYLENOL) tablet 975 mg, 975 mg, Oral, Q8H [address], Ruiz Giordano PA-C, 975 mg at 07/30/22 0548    atorvastatin (LIPITOR) tablet 20 mg, 20 mg, Oral, Daily, Ruiz Giordano PA-C, 20 mg at 07/30/22 0913    cyanocobalamin injection 1,000 mcg, 1,000 mcg, Intramuscular, Q30 Days, Erika Crisostomo PA-C, 1,000 mcg at 07/23/22 1744    Diclofenac Sodium (VOLTAREN) 1 % topical gel 2 g, 2 g, Topical, 4x Daily, Ruiz Giordano PA-C, 2 g at 07/30/22 0915    diltiazem (CARDIZEM) tablet 120 mg, 120 mg, Oral, Q6H [address], Karena Carlos, PA-C, 120 mg at 07/30/22 0554    docusate sodium (COLACE) capsule 100 mg, 100 mg, Oral, BID, Ruiz Giordano PA-C, 100 mg at 07/30/22 0913    guaiFENesin (MUCINEX) 12 hr tablet 600 mg, 600 mg, Oral, Q12H [address], Saúl Cano, CRNP, 600 mg at 07/30/22 0914    heparin (porcine) subcutaneous injection 5,000 Units, 5,000 Units, Subcutaneous, Q8H [address], Ruiz Giordano PA-C, 5,000 Units at 07/30/22 0551    HYDROmorphone HCl (DILAUDID) injection 0 2 mg, 0 2 mg, Intravenous, Q4H PRN, Erika Crisostomo PA-C    levothyroxine tablet 50 mcg, 50 mcg, Oral, Daily, Ruiz Giordano PA-C, 50 mcg at 07/30/22 0551    lidocaine (LIDODERM) 5 % patch 2 patch, 2 patch, Topical, Daily, Erika Crisostomo PA-C, 2 patch at 07/30/22 0915    memantine (NAMENDA) tablet 10 mg, 10 mg, Oral, BID, Ruiz Giordano PA-C, 10 mg at 07/30/22 0914    methocarbamol (ROBAXIN) tablet 250 mg, 250 mg, Oral, Q6H [address], Ruiz Giordano PA-C, 250 mg at 07/30/22 0551    naloxone (NARCAN) 0 04 mg/mL syringe 0 04 mg, 0 04 mg, Intravenous, Q1MIN PRN, Erika Crisostomo PA-C    ondansetron (ZOFRAN) injection 4 mg, 4 mg, Intravenous, Q4H PRN, Erika Crisostomo PA-C, 4 mg at 07/24/22 1636    oxyCODONE (ROXICODONE) IR tablet 2 5 mg, 2 5 mg, Oral, Q4H PRN, Erika Crisostomo PA-C, 2 5 mg at 07/29/22 1941    oxyCODONE (ROXICODONE) IR tablet 5 mg, 5 mg, Oral, Q4H PRN, Ramírez Sellers PA-C, 5 mg at 07/27/22 0002    pantoprazole (PROTONIX) EC tablet 40 mg, 40 mg, Oral, Daily, Ruiz Giordano PA-C, 40 mg at 07/30/22 0914    polyethylene glycol (MIRALAX) packet 17 g, 17 g, Oral, Daily PRN, Ramírez Sellers PA-C, 17 g at 07/23/22 1744    senna (SENOKOT) tablet 17 2 mg, 2 tablet, Oral, Daily, Ruiz Giordano PA-C, 17 2 mg at 07/30/22 5045    sodium chloride tablet 2 g, 2 g, Oral, BID With Meals, Trina Griggs MD, 2 g at 07/30/22 0914    torsemide [facility]) tablet 20 mg, 20 mg, Oral, Daily, Trina Griggs MD, 20 mg at 07/30/22 0913    Invasive Devices:        Lab Results:   Results from last 7 days   Lab Units 07/30/22  0628 07/29/22  0520 07/28/22  0536 07/27/22  0841 07/27/22  0524 07/24/22  1755 07/24/22  0515 07/23/22  1205   WBC Thousand/uL  --   --   --   --  7 42  --  8 96 11 71*   HEMOGLOBIN g/dL  --   --   --   --  10 7*  --  10 7* 12 9   HEMATOCRIT %  --   --   --   --  32 2*  --  32 5* 38 3   PLATELETS Thousands/uL  --   --   --   --  271  --  292 286   POTASSIUM mmol/L 4 0 4 2 3 8   < > 4 3   < > 3 9 4 2   CHLORIDE mmol/L 96 95* 95*   < > 94*   < > 91* 89*   CO2 mmol/L 37* 35* 34*   < > 29   < > 32 29   BUN mg/dL 16 13 12   < > 10   < > 11 7   CREATININE mg/dL 0 56* 0 56* 0 58*   < > 0 61   < > 0 68 0 48*   CALCIUM mg/dL 9 1 9 0 9 1   < > 8 8   < > 8 7 9 2   ALK PHOS U/L  --   --   --   --   --   --   --  137*   ALT U/L  --   --   --   --   --   --   --  15   AST U/L  --   --   --   --   --   --   --  21    < > = values in this interval not displayed  Previous work up:       Portions of the record may have been created with voice recognition software  Occasional wrong word or "sound a like" substitutions may have occurred due to the inherent limitations of voice recognition software  Read the chart carefully and recognize, using context, where substitutions have occurred  If you have any questions, please contact the dictating provider

## 2022-08-01 ENCOUNTER — TELEPHONE (OUTPATIENT)
Dept: NEPHROLOGY | Facility: CLINIC | Age: 87
End: 2022-08-01

## 2022-08-01 DIAGNOSIS — E87.1 HYPONATREMIA: Primary | ICD-10-CM

## 2022-08-01 NOTE — UTILIZATION REVIEW
Notification of Discharge   This is a Notification of Discharge from our facility [address]  Please be advised that this patient has been discharge from our facility  Below you will find the admission and discharge date and time including the patients disposition  UTILIZATION REVIEW CONTACT:  Samira Maharaj MA  Utilization   Network Utilization Review Department  Phone: [phone] x carefully listen to the prompts  All voicemails are confidential   Email: [email or web address]     PHYSICIAN ADVISORY SERVICES:  FOR [id number] REVIEW - MEDICAL NECESSITY DENIAL  Phone: [phone]  Fax: [phone]  Email: [email or web address]     PRESENTATION DATE: 7/23/2022 10:43 AM  OBERVATION ADMISSION DATE:   INPATIENT ADMISSION DATE: 7/23/22  2:09 PM   DISCHARGE DATE: 7/30/2022  2:29 PM  DISPOSITION: Home with [place] with [address] INFORMATION:  Send all requests for admission clinical reviews, approved or denied determinations and any other requests to dedicated fax number below belonging to the campus where the patient is receiving treatment   List of dedicated fax numbers:  [address] DENIALS (Administrative/Medical Necessity) [phone]   [address] [place] [address] (Maternity/NICU/Pediatrics) [phone]   Judi Thayer [phone]   [address] [facility] [phone]   [address] [place] [address] [phone]   [address] [place] [address] [address] [place] [address],[address] [phone]   [facility]  [phone]   [address] [place] [address], [facility]  [address] [facility] [address] [facility] [phone]

## 2022-08-01 NOTE — TELEPHONE ENCOUNTER
----- Message from Brett Nicolas MD sent at 7/30/2022 11:30 AM EDT -----  Patient possible discharge from [facility] today  Please arrange for follow-up with an [facility] in next 3-4 weeks, non Urgent follow-up  Please order for BMP to be done in 1 week for hyponatremia and again before the office visit

## 2022-08-01 NOTE — TELEPHONE ENCOUNTER
Spoke with [facility] and they will have to give a call back because they are unsure if the patients family wants to bring them for the facility   This is the first attempt

## 2022-08-04 NOTE — TELEPHONE ENCOUNTER
Received call from U[facility] returning Melissa's call  Made Dean Plascencia aware Melissa's message to call back to confirm appt date/time/location as well as to have x-ray T-Spine completed prior to appointment  Dean Plascencia requested the x-ray script be faxed to her attention at [phone] and asked if [employer] can complete imaging, made her aware disc of imaging will need to be either dropped off prior to her appointment or at her appointment  Dean Plascencia also stated she will need to contact the family for transportation due to unable to schedule transportation due to conflicting appointments with other residents  She will call back to confirm or reschedule appointment

## 2022-08-04 NOTE — TELEPHONE ENCOUNTER
Spoke with Karyna @ [facility]- she isn't sure if the person that does the scheduling has followed up with the family  She will find out and call back  Left voice mail to schedule appointment  This is the 2nd attempt

## 2022-08-15 ENCOUNTER — TELEPHONE (OUTPATIENT)
Dept: NEPHROLOGY | Facility: CLINIC | Age: 87
End: 2022-08-15

## 2022-08-15 NOTE — TELEPHONE ENCOUNTER
LM with [facility] offering pt sooner hosp f/u with Dr Laine Gallo, 8/25, 9/12, and 9/13  They will call us back if any of these work for their transporter

## 2022-08-19 ENCOUNTER — OFFICE VISIT (OUTPATIENT)
Dept: NEUROSURGERY | Facility: CLINIC | Age: 87
End: 2022-08-19
Payer: COMMERCIAL

## 2022-08-19 VITALS
HEIGHT: 62 IN | DIASTOLIC BLOOD PRESSURE: 62 MMHG | RESPIRATION RATE: 18 BRPM | WEIGHT: 179 LBS | TEMPERATURE: 97.9 F | SYSTOLIC BLOOD PRESSURE: 122 MMHG | BODY MASS INDEX: 32.94 KG/M2 | HEART RATE: 68 BPM

## 2022-08-19 DIAGNOSIS — S22.030D CLOSED WEDGE COMPRESSION FRACTURE OF T3 VERTEBRA WITH ROUTINE HEALING, SUBSEQUENT ENCOUNTER: Primary | ICD-10-CM

## 2022-08-19 PROCEDURE — 99213 OFFICE O/P EST LOW 20 MIN: CPT | Performed by: PHYSICIAN ASSISTANT

## 2022-08-19 NOTE — PROGRESS NOTES
Neurosurgery Office Note  Cliff Flores 80 y o  female MRN: [id number]      Assessment/Plan      Patient is 80years old pleasant lady with past medical history of hypothyroidism, hypertension, chronic chronic congestive heart failure, she is on oxygen supplement via nasal cannula  History of chronic kidney disease stage 3, and traumatic T3 compression deformity following fall  Here today for 3-4 weeks follow-up  Patient had also chronic T4 and severe collapse id T9 compression deformities Had upright thoracic spine x-rays which demonstrates more or less stable alignment of upper thoracic region  Patient reports her pain the same as 3-4 weeks ago, severe  She says her Lower back pain , probably from her chronic T9 fracture and underlying DJD  She seems she is inconsistent with her response  She is not wearing any brace, she states she was not provided with brace in Hospital   Denies any radicular symptoms, numbness or paresthesia across her chest wall has baseline gait instability uses walker for ambulation  Denies any bowel/bladder dysfunction  Patient taking Tylenol  Exam-Patient A&OX2, Victor M, strength 5/5 bialt, sensation to LT intact bilaterally  DTR 2+ without clonus  Tenderness at T3 and  TL region noted on palpation of posterior spine  Hx, PEx, and imaging reviewed with the patient and her transporter  Mx plan discussed  Advised patient to take tylenol PRN, Apply Lidocaine patch on the T43 region, continue PT  F/U in 4 weeks with Upright Thoracic sine xrays  Fall precaution, avoid lifting heavy objects, excessive bending or twisting  All questions and concerns were answered  To patient's satisfaction  Both patient and her transporter expressed their understandings and agreed with the plan  Plan:  1  Follow-up upright thoracic spine x-rays in 4 weeks  2  Advised to take Tylenol scheduled  3  Apply patch on T3 region intermittently  4   Fall precaution, avoid lifting heavy objects, excessive bending or twisting    I spent 30 minutes with the patient today in which >50% of the time was spent counseling/coordination of care regarding diagnosis, imaging review, symptoms and treatment plan  Chief Complaint   Patient presents with    Follow-up    Back Pain        C/C: " 3-4 weeks F/U for Traumatic T3 compression"/Hx of fall    History of Present Illness      All patient's medical histories were reviewed and updated as appropriate: Allergies, current medication lists, past medical history, past surgical history, family history, social history, and current medical lists    Patient is here today for 3-4 weeks follow-up of traumatic T3 compression deformity, has also chronic T4 and T9 compression deformity  Patient reports moderate to severe pain, takes Tylenol, denies wearing brace  She states brace was not ordered in the hospital   Patient denies any radicular symptoms, paresthesia, numbness or weakness in the extremities  Baseline gait instability uses walker for ambulation  No Bowel or bladder dysfunction related to her fracture  Denies history of fever, chills, rigors, cough or chest pain  REVIEW OF SYSTEMS  Review of system personally reviewed and updated  Review of Systems   Constitutional: Negative  HENT: Negative  Eyes: Negative  Respiratory: Negative  Cardiovascular: Negative  Gastrointestinal: Negative  Endocrine: Negative  Genitourinary: Negative for vaginal pain  Musculoskeletal: Positive for back pain and gait problem (uses walker for assistance)  T3 comp fx s/p slip and fall on 7/23/22   Skin: Negative  Allergic/Immunologic: Negative  Hematological: Negative  Psychiatric/Behavioral: Negative  All other systems reviewed and are negative          Meds/Allergies     Current Outpatient Medications   Medication Sig Dispense Refill    acetaminophen (TYLENOL) 325 mg tablet Take 2 tablets (650 mg total) by mouth every 6 (six) hours as needed for mild pain 30 tablet 0    atorvastatin (LIPITOR) 20 mg tablet Take 20 mg by mouth daily      clopidogrel (PLAVIX) 75 mg tablet Take 75 mg by mouth daily      cyanocobalamin 1,000 mcg/mL Inject 1,000 mcg into a muscle every 30 (thirty) days      Diclofenac Sodium (VOLTAREN) 1 % Apply 2 g topically 4 (four) times a day      diltiazem (CARDIZEM) 120 MG tablet Take 120 mg by mouth every 6 (six) hours      docusate sodium (COLACE) 100 mg capsule Take 1 capsule (100 mg total) by mouth 2 (two) times a day  0    glucose blood test strip Use 1 each daily as needed Use as instructed      guaiFENesin (MUCINEX) 600 mg 12 hr tablet Take 1 tablet (600 mg total) by mouth every 12 (twelve) hours  0    levothyroxine 50 mcg tablet Take 50 mcg by mouth daily      lidocaine (LIDODERM) 5 % Apply 2 patches topically daily Remove & Discard patch within 12 hours or as directed by MD  0    memantine (NAMENDA) 10 mg tablet Take 10 mg by mouth 2 (two) times a day      methocarbamol (ROBAXIN) 500 mg tablet Take 0 5 tablets (250 mg total) by mouth every 6 (six) hours as needed for muscle spasms  0    Multiple Vitamins-Minerals ([employer]) TABS Take by mouth      oxyCODONE (ROXICODONE) 5 immediate release tablet You may take 2 5mg (0 5 tab) for moderate pain or 5mg (1 tab) for severe pain, every 4 hours, as needed  20 tablet 0    pantoprazole (PROTONIX) 40 mg tablet Take 40 mg by mouth daily      sodium chloride 1 g tablet Take 2 tablets (2 g total) by mouth 2 (two) times a day with meals  0    torsemide (DEMADEX) 20 mg tablet Take 1 tablet (20 mg total) by mouth daily  0     No current facility-administered medications for this visit         Allergies   Allergen Reactions    5ht3 Receptor Antagonists Hives    Codeine Hives    Penicillins Hives       PAST HISTORY    Past Medical History:   Diagnosis Date    CHF (congestive heart failure) (HCC)     Dementia without behavioral disturbance (HCC)     High cholesterol     Hypertension     Hypothyroid     Stage 3 chronic kidney disease, unspecified whether stage 3a or 3b CKD ([facility] )     Stroke ([facility] ) 09/21/2021       Past Surgical History:   Procedure Laterality Date    APPENDECTOMY      CHOLECYSTECTOMY      NEPHRECTOMY Left     TOTAL ABDOMINAL HYSTERECTOMY W/ BILATERAL SALPINGOOPHORECTOMY         Social History     Tobacco Use    Smoking status: Never Smoker    Smokeless tobacco: Never Used   Vaping Use    Vaping Use: Never used   Substance Use Topics    Alcohol use: Not Currently    Drug use: Never       Family History   Problem Relation Age of Onset    Diabetes Mother     Cancer Sister          Above history personally reviewed  EXAM    Vitals: There were no vitals taken for this visit  ,There is no height or weight on file to calculate BMI  Physical Exam  Constitutional:       Appearance: She is obese  HENT:      Head: Normocephalic and atraumatic  Eyes:      Extraocular Movements: Extraocular movements intact  Cardiovascular:      Rate and Rhythm: Normal rate  Pulmonary:      Comments: On nasal oxygen supplement via nasal canal  Musculoskeletal:         General: Tenderness present  Cervical back: Normal range of motion  Neurological:      General: No focal deficit present  Mental Status: She is alert  GCS: GCS eye subscore is 4  GCS verbal subscore is 5  GCS motor subscore is 6  Cranial Nerves: Cranial nerves are intact  Sensory: Sensation is intact  Motor: Motor function is intact  Deep Tendon Reflexes: Reflexes are normal and symmetric  Reflex Scores:       Tricep reflexes are 2+ on the right side and 2+ on the left side  Bicep reflexes are 2+ on the right side and 2+ on the left side  Brachioradialis reflexes are 2+ on the right side and 2+ on the left side  Patellar reflexes are 2+ on the right side and 2+ on the left side         Achilles reflexes are 2+ on the right side and 2+ on the left side  Psychiatric:         Speech: Speech normal          Neurologic Exam     Mental Status   Speech: speech is normal   Level of consciousness: alert    Cranial Nerves     CN III, IV, VI   Nystagmus: none     CN XI   CN XI normal      Motor Exam   Muscle bulk: normal  Overall muscle tone: normal  Right arm tone: normal  Left arm tone: normal  Right arm pronator drift: absent  Left arm pronator drift: absent  Right leg tone: normal  Left leg tone: normal    Sensory Exam   Light touch normal      Gait, Coordination, and Reflexes     Reflexes   Right brachioradialis: 2+  Left brachioradialis: 2+  Right biceps: 2+  Left biceps: 2+  Right triceps: 2+  Left triceps: 2+  Right patellar: 2+  Left patellar: 2+  Right achilles: 2+  Left achilles: 2+  Right : 2+  Left : 2+  Right Cleaning: absent  Left Cleaning: absent  Right ankle clonus: absent  Left pendular knee jerk: absent        MEDICAL DECISION MAKING    Imaging Studies:     XR chest pa & lateral    Result Date: 7/25/2022  Narrative: CHEST INDICATION:   Follow-up on rib fractures in setting of COPD  COMPARISON:  CT dated 7/23/2022  EXAM PERFORMED/VIEWS:  XR CHEST PA & LATERAL FINDINGS: Cardiomediastinal silhouette appears unremarkable  Emphysema, traction bronchiectasis and thickening of the axial interstitium, similar to prior CT  No focal consolidation, pleural effusion or pneumothorax  Nondisplaced left side rib fractures seen on prior CT are not visualized radiographically  Chronic deformity of left proximal humerus  Prior right rotator cuff repair  Impression: Emphysema, traction bronchiectasis and thickening of the axial interstitium, similar to prior CT  Nondisplaced left side rib fractures seen on prior CT are not visualized radiographically  Workstation performed: [id number]     XR spine thoracic 3 vw    Result Date: 7/26/2022  Narrative: THORACIC SPINE INDICATION: Follow-up T3 fracture   COMPARISON:  CT thoracic spine 7/23/2022 VIEWS:  XR SPINE THORACIC 3 VW Images: 4 FINDINGS: Study is limited by bony demineralization  Mild compression fracture deformity of T3, similar  Stable mild compression fracture T4  Severe compression fracture deformity of T9, chronic  Thoracic vertebral alignment is within normal limits  Age related degenerative changes are seen  There is no displacement of the paraspinal line  The pedicles appear intact  Bilateral bronchiectasis with bronchial wall thickening again noted  Impression: Limited study due to bony demineralization  Unchanged appearance of thoracic compression fracture deformities  Workstation performed: [id number]     XR shoulder 2+ views LEFT    Result Date: 7/23/2022  Narrative: LEFT SHOULDER INDICATION:   L shoulder pain  COMPARISON:  None VIEWS:  XR SHOULDER 2+ VW LEFT FINDINGS: Deformity left humeral head consistent with old trauma  No acute fracture identified  No significant degenerative changes  No lytic or blastic osseous lesion  Soft tissues are unremarkable  Impression: No acute osseous abnormality  Deformity of the left humeral head consistent with old trauma  Workstation performed: [id number]     CT head without contrast    Result Date: 7/23/2022  Narrative: CT BRAIN - WITHOUT CONTRAST INDICATION:   fall, on plavix  Fall  Pt fall/slid out of recliner landing on rear  C/o L shoulder pain  Denies head strike  80-year-old female with history of hypertension, hyperlipidemia, hypothyroidism, dementia, on daily Plavix  Patient presents for evaluation after slipping out of her chair this morning  The patient stats that she accidentally slipped out of the chair landing on her bottom  She denies hitting her head or loss of consciousness  States that she was sitting on the ground for about 2 hours before someone came into her room and found her  The patient reports pain in her left shoulder, left rib cage beneath her left breast, and left midback    The pain in her left rib cage is new but the pain in her left shoulder and left middle back have been present since the fall 9 days ago for which she  was evaluated in the emergency department as a trauma alert  At that point she had a CT scan of her head which was unremarkable, CT scan of her cervical spine which showed no acute fracture or malalignment, and CT scan of the chest, abdomen, and pelvis  which showed an age-indeterminate compression fracture to T8 that was suspected to be chronic based on the appearance  She was discharged back to her skilled nursing facility  States that despite taking Tylenol she is continuing to have pain in her left shoulder and back  The patient ambulates with a walker  Denies chest pain, shortness of breath, or palpitations preceding her fall today  The patient's entire past medical history was obtained directly from either the attending emergency room physicians notes and/or the resident/physician's assistant notes in [address] [place] [address]  The information was copied and pasted directly from Epic  COMPARISON:  Noncontrast CT brain July 14, 2022 TECHNIQUE:  CT examination of the brain was performed  In addition to axial images, sagittal and coronal 2D reformatted images were created and submitted for interpretation  Radiation dose length product (DLP) for this visit:  911 mGy-cm   This examination, like all CT scans performed in the [place], was performed utilizing techniques to minimize radiation dose exposure, including the use of iterative reconstruction and automated exposure control  IMAGE QUALITY:  Study somewhat limited due to patient motion artifact  FINDINGS: PARENCHYMA: Decreased attenuation is noted in periventricular and subcortical white matter demonstrating an appearance that is statistically most likely to represent moderate microangiopathic change; this appearance is similar when compared to most recent prior examination    Chronic lacunar infarction(s) are noted in basal ganglia, unchanged from prior exam  No CT signs of acute infarction  No intracranial mass, mass effect or midline shift  No acute parenchymal hemorrhage  Bilateral internal carotid artery calcifications  VENTRICLES AND EXTRA-AXIAL SPACES:  Enlargement of ventricles and extra-axial CSF spaces, out of proportion to the patient's age most consistent with cerebral and cerebellar atrophy  VISUALIZED ORBITS AND PARANASAL SINUSES:  No acute abnormality involving the orbits  Bilateral lens surgery  Mild scattered sinus mucosal thickening is noted  No fluid levels are seen  CALVARIUM AND EXTRACRANIAL SOFT TISSUES:  Normal      Impression: 1  Somewhat limited examination due to patient motion artifact  2   Stable cerebral atrophy with 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  No acute intracranial abnormality  If there is continued concern for acute intracranial injury, consider follow-up neurology consultation and/or MRI of the brain with T2 gradient echo weighted sequencing and FLAIR weighted sequencing  Workstation performed: [id number]     CT chest without contrast    Result Date: 7/23/2022  Narrative: CT CHEST WITHOUT IV CONTRAST INDICATION:   Rib fracture suspected, traumatic Fall with L anterior rib pain  Fall  Pt fall/slid out of recliner landing on rear  C/o L shoulder pain  Denies head strike  59-year-old female with history of hypertension, hyperlipidemia, hypothyroidism, dementia, on daily Plavix  Patient presents for evaluation after slipping out of her chair this morning  The patient stats that she accidentally slipped out of the chair landing on her bottom  She denies hitting her head or loss of consciousness  States that she was sitting on the ground for about 2 hours before someone came into her room and found her  The patient reports pain in her left shoulder, left rib cage beneath her left breast, and left midback    The pain in her left rib cage is new but the pain in her left shoulder and left middle back have been present since the fall 9 days ago for which she  was evaluated in the emergency department as a trauma alert  At that point she had a CT scan of her head which was unremarkable, CT scan of her cervical spine which showed no acute fracture or malalignment, and CT scan of the chest, abdomen, and pelvis  which showed an age-indeterminate compression fracture to T8 that was suspected to be chronic based on the appearance  She was discharged back to her skilled nursing facility  States that despite taking Tylenol she is continuing to have pain in her left shoulder and back  The patient ambulates with a walker  Denies chest pain, shortness of breath, or palpitations preceding her fall today  The patient's entire past medical history was obtained directly from either the attending emergency room physicians notes and/or the resident/physician's assistant notes in [address] [place] [address] Rd  The information was copied and pasted directly from Epic  COMPARISON:  CT chest abdomen pelvis July 14, 2022 TECHNIQUE: CT examination of the chest was performed without intravenous contrast  This examination was performed without intravenous contrast in the context of the critical nationwide Omn[place] shortage  Axial, sagittal, and coronal 2D reformatted images were created from the source data and submitted for interpretation  3D reconstructions of the bony thorax were performed in order to improved sensitivity of evaluation for rib fractures  Radiation dose length product (DLP) for this visit:  396 mGy-cm   This examination, like all CT scans performed in the [place], was performed utilizing techniques to minimize radiation dose exposure, including the use of iterative reconstruction and automated exposure control  FINDINGS: LUNGS:  The lungs are hyperinflated  Diffuse honeycombing is present throughout the lungs bilaterally    Bronchiectatic changes and fibrotic changes are present throughout the lungs bilaterally, similar to the prior study  No focal areas of consolidation to suggest pneumonia  PLEURA:  No evidence of a pleural effusion or pneumothorax  HEART/GREAT VESSELS: The heart is normal in size  Coronary artery calcifications  Aortic and mitral valvular calcifications  No evidence of a pericardial effusion  Atherosclerotic changes are present in the aortic arch  No thoracic aortic aneurysm  MEDIASTINUM AND SUZANNA:  Stable subcentimeter mediastinal, paratracheal, subcarinal and bilateral hilar lymph node prominence  CHEST WALL AND LOWER NECK:  Unremarkable  VISUALIZED STRUCTURES IN THE UPPER ABDOMEN:  Hiatal hernia  Stable exophytic cyst upper pole right kidney  Stable right-sided caliectasis versus parapelvic cysts  OSSEOUS STRUCTURES:  The osseous structures are demineralized  There are old, healed rib fractures bilaterally  There is an acute, minimally displaced fracture involving the anterior aspect of the left 5th rib (series 2, image 35)  There is a nondisplaced buckle fracture involving the anterior aspect of the left 7th rib (series 2, image 44)  Mild compression fracture involving the superior and inferior endplates of T3, progressed from the prior study  Stable, mild compression fracture superior endplate T4  Stable, severe compression fracture T9 with vertebral plana  Impression: 1  Acute fractures of the left 5th and 7th ribs  No evidence of pneumothorax  2   Mild compression fracture T3, progressed from the prior study  3   Additional, stable incidental findings as described above  If warranted, consider follow-up trauma surgery and/or neuro surgery consultation  The study was marked in [facility] for immediate notification  Workstation performed: [id number]     CT spine cervical without contrast    Result Date: 7/23/2022  Narrative: CT CERVICAL SPINE - WITHOUT CONTRAST INDICATION:   Neck trauma (Age >= 65y) fall with neck pain  Fall   Pt fall/slid out of recliner landing on rear  C/o L shoulder pain  Denies head strike  80-year-old female with history of hypertension, hyperlipidemia, hypothyroidism, dementia, on daily Plavix  Patient presents for evaluation after slipping out of her chair this morning  The patient stats that she accidentally slipped out of the chair landing on her bottom  She denies hitting her head or loss of consciousness  States that she was sitting on the ground for about 2 hours before someone came into her room and found her  The patient reports pain in her left shoulder, left rib cage beneath her left breast, and left midback  The pain in her left rib cage is new but the pain in her left shoulder and left middle back have been present since the fall 9 days ago for which she  was evaluated in the emergency department as a trauma alert  At that point she had a CT scan of her head which was unremarkable, CT scan of her cervical spine which showed no acute fracture or malalignment, and CT scan of the chest, abdomen, and pelvis  which showed an age-indeterminate compression fracture to T8 that was suspected to be chronic based on the appearance  She was discharged back to her skilled nursing facility  States that despite taking Tylenol she is continuing to have pain in her left shoulder and back  The patient ambulates with a walker  Denies chest pain, shortness of breath, or palpitations preceding her fall today  The patient's entire past medical history was obtained directly from either the attending emergency room physicians notes and/or the resident/physician's assistant notes in [place]  The information was copied and pasted directly from Epic  COMPARISON:  CT cervical spine July 14, 2022 TECHNIQUE:  CT examination of the cervical spine was performed without intravenous contrast   Contiguous axial images were obtained  Sagittal and coronal reconstructions were performed  Radiation dose length product (DLP) for this visit:  385 mGy-cm     This examination, like all CT scans performed in the [facility], was performed utilizing techniques to minimize radiation dose exposure, including the use of iterative reconstruction and automated exposure control  IMAGE QUALITY:  Diagnostic  FINDINGS: ALIGNMENT:  Straightening and reversal of the cervical lordotic curvature  Degenerative grade 1 anterolisthesis C3 on C4  VERTEBRAL BODIES:  Osseous structures demineralized  No acute fracture or osseous destructive lesions  Degenerative endplate changes diffusely throughout the cervical spine, most pronounced C5-C6  DEGENERATIVE CHANGES:  Moderate multilevel cervical degenerative changes are noted  Fusion of the left facet joint C2-C3, likely degenerative  No critical central canal stenosis  Degenerative changes at the atlantoaxial articulation with mild pannus formation  This results in mild narrowing at the craniocervical junction  PREVERTEBRAL AND PARASPINAL SOFT TISSUES:  Unremarkable  THORACIC INLET:  Please refer to the concurrent chest CT report for description of the thoracic inlet findings  Impression: Stable degenerative changes of the cervical spine  No acute cervical spine fracture or traumatic malalignment  Workstation performed: [id number]     CT recon only thoracic spine    Result Date: 7/23/2022  Narrative: CT THORACIC SPINE INDICATION:   fall with pain over mid-thoracic spine  Fall  Pt fall/slid out of recliner landing on rear  C/o L shoulder pain  Denies head strike  78-year-old female with history of hypertension, hyperlipidemia, hypothyroidism, dementia, on daily Plavix  Patient presents for evaluation after slipping out of her chair this morning  The patient stats that she accidentally slipped out of the chair landing on her bottom  She denies hitting her head or loss of consciousness  States that she was sitting on the ground for about 2 hours before someone came into her room and found her    The patient reports pain in her left shoulder, left rib cage beneath her left breast, and left midback  The pain in her left rib cage is new but the pain in her left shoulder and left middle back have been present since the fall 9 days ago for which she  was evaluated in the emergency department as a trauma alert  At that point she had a CT scan of her head which was unremarkable, CT scan of her cervical spine which showed no acute fracture or malalignment, and CT scan of the chest, abdomen, and pelvis  which showed an age-indeterminate compression fracture to T8 that was suspected to be chronic based on the appearance  She was discharged back to her skilled nursing facility  States that despite taking Tylenol she is continuing to have pain in her left shoulder and back  The patient ambulates with a walker  Denies chest pain, shortness of breath, or palpitations preceding her fall today  The patient's entire past medical history was obtained directly from either the attending emergency room physicians notes and/or the resident/physician's assistant notes in [place]  The information was copied and pasted directly from Epic  COMPARISON:  CT chest abdomen pelvis July 14, 2022  TECHNIQUE: Axial CT examination of the thoracic spine was obtained utilizing reconstructed images from CT of the chest abdomen and pelvis performed the same day  Images were reformatted in the sagittal and coronal planes  This examination, like all CT scans performed in the [facility], was performed utilizing techniques to minimize radiation dose exposure, including the use of iterative reconstruction and automated exposure control  FINDINGS: ALIGNMENT: Normal alignment of thoracic vertebral bodies  No subluxation  VERTEBRAL BODIES: The osseous structures are demineralized  There is progression of the mild compression fracture involving the superior and inferior endplates of T3  There is no bony retropulsion   There is a stable, mild compression fracture involving the superior endplate of T4  There is a stable severe compression fracture involving the superior and inferior endplates of T9 with vertebral plana  DEGENERATIVE CHANGES: Mild multilevel degenerative disc disease  PREVERTEBRAL AND PARASPINAL SOFT TISSUES: Normal      Impression: 1  Progression of mild compression fracture T3 vertebral body  2   Stable compression fractures of T4 and T9 vertebral bodies   Workstation performed: [id number]       I have personally reviewed pertinent reports   , I have personally reviewed pertinent films in PACS and I have personally reviewed pertinent films in PACS with a Radiologist

## 2022-08-26 NOTE — TELEPHONE ENCOUNTER
Called and spoke to Tayler Jimenez at [facility] to check if the patient wanted to establish care with [facility] since they are currently established with [facility]  If they wanted to establish with   [facility], offer a sooner appt with Dr Lynn Jordan in [facility] for the week of 8/29 if there are still openings  Tayler Jimenez stated that she would call back after speaking to the patient and family

## 2022-08-30 NOTE — TELEPHONE ENCOUNTER
Called and spoke to [address] to offer sooner appt of 9/2 with Dr Manpreet Villalba in [address] [place], [place] [address]  Also requested that they check if patient wants to be seen by [facility] or continue with their established nephrologist with [facility]  Was told that they would call back with a response to both questions

## 2022-09-21 ENCOUNTER — OFFICE VISIT (OUTPATIENT)
Dept: NEUROSURGERY | Facility: CLINIC | Age: 87
End: 2022-09-21
Payer: COMMERCIAL

## 2022-09-21 ENCOUNTER — HOSPITAL ENCOUNTER (OUTPATIENT)
Dept: RADIOLOGY | Facility: HOSPITAL | Age: 87
Discharge: HOME | End: 2022-09-21
Payer: COMMERCIAL

## 2022-09-21 VITALS
BODY MASS INDEX: 32.94 KG/M2 | RESPIRATION RATE: 16 BRPM | WEIGHT: 179 LBS | HEIGHT: 62 IN | HEART RATE: 81 BPM | SYSTOLIC BLOOD PRESSURE: 128 MMHG | TEMPERATURE: 98.5 F | DIASTOLIC BLOOD PRESSURE: 76 MMHG

## 2022-09-21 DIAGNOSIS — S22.030D CLOSED WEDGE COMPRESSION FRACTURE OF T3 VERTEBRA WITH ROUTINE HEALING, SUBSEQUENT ENCOUNTER: ICD-10-CM

## 2022-09-21 DIAGNOSIS — S22.039A CLOSED T3 FRACTURE (HCC): Primary | ICD-10-CM

## 2022-09-21 PROCEDURE — 72072 X-RAY EXAM THORAC SPINE 3VWS: CPT

## 2022-09-21 PROCEDURE — 99213 OFFICE O/P EST LOW 20 MIN: CPT | Performed by: PHYSICIAN ASSISTANT

## 2022-09-21 NOTE — PROGRESS NOTES
Office Note - Neurosurgery   Brooklyn Forward 80 y o  female MRN: [id number]      Assessment:    Patient is 80years old pleasant lady with past medical history of hypothyroidism, hypertension, chronic congestive heart failure, currently residing at assisted living facility, came in for follow-up 7-8 weeks status post traumatic T3 compression deformity following fall  Patient is not wearing brace, had ribs fracture initially and was not comfortable for her with breathing  She reports mild, sometimes severe upper central thoracic spine/shoulder blade pain, no radicular symptoms  She has Hx of chronic T9 collapsed deformity  Her 4 weeks F/U thoracic spine xrays shows more or less stable T3 compression deformity  Official report pending  Patient has gait issues and uses walker  Exam-A&OX3, Victor M   and elbow flexion/extension including LEs strength 5/5 bilaterally  Sensation to LT intact bilaterally  DTR 2+ wo clonus   Mild to moderate tenderness on palpation of T3 region  Hx, PEx and images reviewed with the patient  Mx plan discussed  Patient still with pain at T3 region, recommend follow up with XR of thoracic spine in 4 weeks  Recommend Tylenol 975mg oral TID to cover her pain over 24 hours  Advised fall precaution, avoid lifting heavy objects, excessive bending or twisting  All questions and concerns were answered  Patient expressed her understandings and agreed with the plan  Plan:  1  F/U upright Thoracic spine xrays/4 weeks  2  Continue Tylenol for pain 975 mg oral Q8H scheduled  3  Fall precaution, avoid lifting heavy objects, or excessive bending  4   F/U in 2 weeks with Xrays      Subjective/Objective     C/C: " 7-8 weeks follow up for T3 compression deformity"        HPI     Patient is 80years old pleasant lady with past medical history of hypothyroidism, hypertension, chronic congestive heart failure, currently residing at assisted living facility, came in for follow-up 7-8 weeks status post traumatic T3 compression deformity following fall  Patient is not wearing brace, had ribs fracture initially and was not comfortable for her with breathing  She reports mild, sometimes severe upper central thoracic spine/shoulder blade pain, no radicular symptoms  She has Hx of chronic T9 collapsed deformity  Her 4 weeks F/U thoracic spine xrays shows more or less stable T3 compression deformity  Official report pending  Patient has gait issues and uses walker for ambulation  She denies fever, chills, rigors, cough or chest pain  ROS  ROS personally reviewed and updated  Review of Systems   Constitutional: Negative  HENT: Negative  Eyes: Negative  Respiratory: Negative  On O2   Cardiovascular: Negative  Gastrointestinal: Negative  Endocrine: Negative  Genitourinary: Negative  Negative for vaginal pain  Musculoskeletal: Positive for back pain (back pain and discomfort with movement in midback) and gait problem (uses walker for assistance)  T3 comp fx s/p slip and fall on 7/23/22/ 3 fractured ribs  Skin: Negative  Allergic/Immunologic: Negative  Neurological:        Hx of mini strokes   Hematological: Negative  Psychiatric/Behavioral: Negative  Family History    Family History   Problem Relation Age of Onset    Diabetes Mother     Cancer Sister        Social History    Social History     Socioeconomic History    Marital status:       Spouse name: Not on file    Number of children: Not on file    Years of education: Not on file    Highest education level: Not on file   Occupational History    Not on file   Tobacco Use    Smoking status: Never Smoker    Smokeless tobacco: Never Used   Vaping Use    Vaping Use: Never used   Substance and Sexual Activity    Alcohol use: Not Currently    Drug use: Never    Sexual activity: Not Currently   Other Topics Concern    Not on file   Social History Narrative    ** Merged History Encounter ** Social Determinants of Health     Financial Resource Strain: Not on file   Food Insecurity: No Food Insecurity    Worried About Running Out of Food in the Last Year: Never true    Zain of Food in the Last Year: Never true   Transportation Needs: No Transportation Needs    Lack of Transportation (Medical): No    Lack of Transportation (Non-Medical):  No   Physical Activity: Not on file   Stress: Not on file   Social Connections: Not on file   Intimate Partner Violence: Not on file   Housing Stability: Unknown    Unable to Pay for Housing in the Last Year: No    Number of Places Lived in the Last Year: Not on file    Unstable Housing in the Last Year: No       Past Medical History    Past Medical History:   Diagnosis Date    CHF (congestive heart failure) ([place] [address] )     Dementia without behavioral disturbance ([place] [address] )     High cholesterol     Hypertension     Hypothyroid     Stage 3 chronic kidney disease, unspecified whether stage 3a or 3b CKD ([place] [address] )     Stroke ([place] [address] ) 09/21/2021       Surgical History    Past Surgical History:   Procedure Laterality Date    APPENDECTOMY      CHOLECYSTECTOMY      NEPHRECTOMY Left     TOTAL ABDOMINAL HYSTERECTOMY W/ BILATERAL SALPINGOOPHORECTOMY         Medications      Current Outpatient Medications:     acetaminophen (TYLENOL) 325 mg tablet, Take 2 tablets (650 mg total) by mouth every 6 (six) hours as needed for mild pain, Disp: 30 tablet, Rfl: 0    atorvastatin (LIPITOR) 20 mg tablet, Take 20 mg by mouth daily, Disp: , Rfl:     clopidogrel (PLAVIX) 75 mg tablet, Take 75 mg by mouth daily, Disp: , Rfl:     cyanocobalamin 1,000 mcg/mL, Inject 1,000 mcg into a muscle every 30 (thirty) days, Disp: , Rfl:     Diclofenac Sodium (VOLTAREN) 1 %, Apply 2 g topically 4 (four) times a day, Disp: , Rfl:     diltiazem (CARDIZEM) 120 MG tablet, Take 120 mg by mouth every 6 (six) hours, Disp: , Rfl:     docusate sodium (COLACE) 100 mg capsule, Take 1 capsule (100 mg total) by mouth 2 (two) times a day, Disp: , Rfl: 0    glucose blood test strip, Use 1 each daily as needed Use as instructed, Disp: , Rfl:     guaiFENesin (MUCINEX) 600 mg 12 hr tablet, Take 1 tablet (600 mg total) by mouth every 12 (twelve) hours, Disp: , Rfl: 0    levothyroxine 50 mcg tablet, Take 50 mcg by mouth daily, Disp: , Rfl:     lidocaine (LIDODERM) 5 %, Apply 2 patches topically daily Remove & Discard patch within 12 hours or as directed by MD, Disp: , Rfl: 0    memantine (NAMENDA) 10 mg tablet, Take 10 mg by mouth 2 (two) times a day, Disp: , Rfl:     methocarbamol (ROBAXIN) 500 mg tablet, Take 0 5 tablets (250 mg total) by mouth every 6 (six) hours as needed for muscle spasms, Disp: , Rfl: 0    Multiple Vitamins-Minerals (Sentry Senior) TABS, Take by mouth, Disp: , Rfl:     oxyCODONE (ROXICODONE) 5 immediate release tablet, You may take 2 5mg (0 5 tab) for moderate pain or 5mg (1 tab) for severe pain, every 4 hours, as needed  , Disp: 20 tablet, Rfl: 0    pantoprazole (PROTONIX) 40 mg tablet, Take 40 mg by mouth daily, Disp: , Rfl:     sodium chloride 1 g tablet, Take 2 tablets (2 g total) by mouth 2 (two) times a day with meals, Disp: , Rfl: 0    torsemide (DEMADEX) 20 mg tablet, Take 1 tablet (20 mg total) by mouth daily, Disp: , Rfl: 0    Allergies    Allergies   Allergen Reactions    5ht3 Receptor Antagonists Hives    Codeine Hives    Penicillins Hives       The following portions of the patient's history were reviewed and updated as appropriate: allergies, current medications, past family history, past medical history, past social history, past surgical history and problem list     Investigations    I personally reviewed the XRAY results with the patient:        Physical Exam    There were no vitals filed for this visit  Physical Exam  Constitutional:       Appearance: Normal appearance  HENT:      Head: Normocephalic and atraumatic     Eyes:      Extraocular Movements: Extraocular movements intact  Cardiovascular:      Rate and Rhythm: Normal rate and regular rhythm  Pulmonary:      Effort: Pulmonary effort is normal    Musculoskeletal:         General: Tenderness present  Cervical back: Normal range of motion  Neurological:      General: No focal deficit present  Mental Status: She is alert and oriented to person, place, and time  GCS: GCS eye subscore is 4  GCS verbal subscore is 5  GCS motor subscore is 6  Cranial Nerves: Cranial nerves are intact  Sensory: Sensation is intact  Motor: Motor function is intact  Coordination: Finger-Nose-Finger Test normal       Deep Tendon Reflexes: Reflexes are normal and symmetric  Reflex Scores:       Tricep reflexes are 2+ on the right side and 2+ on the left side  Bicep reflexes are 2+ on the right side and 2+ on the left side  Brachioradialis reflexes are 2+ on the right side and 2+ on the left side  Patellar reflexes are 2+ on the right side and 2+ on the left side  Achilles reflexes are 2+ on the right side and 2+ on the left side  Psychiatric:         Speech: Speech normal        Neurologic Exam     Mental Status   Oriented to person, place, and time     Speech: speech is normal   Level of consciousness: alert    Cranial Nerves     CN III, IV, VI   Nystagmus: none     CN XI   CN XI normal      Motor Exam   Muscle bulk: normal  Overall muscle tone: normal  Right arm tone: normal  Left arm tone: normal  Right arm pronator drift: absent  Left arm pronator drift: absent  Right leg tone: normal  Left leg tone: normal    Sensory Exam   Light touch normal      Gait, Coordination, and Reflexes     Coordination   Finger to nose coordination: normal    Reflexes   Right brachioradialis: 2+  Left brachioradialis: 2+  Right biceps: 2+  Left biceps: 2+  Right triceps: 2+  Left triceps: 2+  Right patellar: 2+  Left patellar: 2+  Right achilles: 2+  Left achilles: 2+  Right : 2+  Left : 2+  Right Cleaning: absent  Left Cleaning: absent  Right ankle clonus: absent  Left pendular knee jerk: absent

## 2022-09-26 ENCOUNTER — TELEPHONE (OUTPATIENT)
Dept: NEUROSURGERY | Facility: CLINIC | Age: 87
End: 2022-09-26

## 2022-09-26 NOTE — TELEPHONE ENCOUNTER
9/26/2022 ROMEL Richards; Filomena Ascencior, LAURA Colon, thanks Sasha Ordoñez             Previous Messages       ----- Message -----   From: Luke Kate   Sent: 9/26/2022   8:07 AM EDT   To: Filomena Alejo, LAURA, Cali Santoro PA-C   Subject: abnormal xray 9/21/tspine                         Hi guys radiology just called abnormal xray tspine 9/21/22 thanks !

## 2022-09-28 ENCOUNTER — OFFICE VISIT (OUTPATIENT)
Dept: NEPHROLOGY | Facility: CLINIC | Age: 87
End: 2022-09-28
Payer: COMMERCIAL

## 2022-09-28 VITALS — HEART RATE: 80 BPM | SYSTOLIC BLOOD PRESSURE: 130 MMHG | DIASTOLIC BLOOD PRESSURE: 64 MMHG

## 2022-09-28 DIAGNOSIS — I10 HYPERTENSION, UNSPECIFIED TYPE: Chronic | ICD-10-CM

## 2022-09-28 DIAGNOSIS — E87.1 HYPONATREMIA: Primary | ICD-10-CM

## 2022-09-28 DIAGNOSIS — N18.2 STAGE 2 CHRONIC KIDNEY DISEASE: ICD-10-CM

## 2022-09-28 DIAGNOSIS — I50.9 CHRONIC CONGESTIVE HEART FAILURE, UNSPECIFIED HEART FAILURE TYPE (HCC): Chronic | ICD-10-CM

## 2022-09-28 PROCEDURE — 99214 OFFICE O/P EST MOD 30 MIN: CPT | Performed by: PHYSICIAN ASSISTANT

## 2022-09-28 RX ORDER — SODIUM CHLORIDE 1000 MG
2 TABLET, SOLUBLE MISCELLANEOUS 2 TIMES DAILY WITH MEALS
Qty: 120 TABLET | Refills: 2 | Status: SHIPPED | OUTPATIENT
Start: 2022-09-28

## 2022-09-28 RX ORDER — ERGOCALCIFEROL 1.25 MG/1
50000 CAPSULE ORAL WEEKLY
COMMUNITY

## 2022-09-28 RX ORDER — GABAPENTIN 100 MG/1
100 CAPSULE ORAL
COMMUNITY
Start: 2022-09-12

## 2022-09-28 NOTE — PROGRESS NOTES
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  Most recent sodium from last week is up to 141 which is at normal level  Workup: serum osmolality 271, urine osmolality 295, urine sodium 30, uric acid 5 2, TSH normal (2 8), random cortisol 16 3  Imaging: without signs of malignancy  Treatment: salt tablets 2g BID + torsemide 20mg daily + fluid restriction of 1800ml/day (<60oz per day)  Plan: decrease salt tablets to 1g BID and repeat blood work in 2 weeks  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)  Previously followed with nephrology in [place]  Baseline creatinine 0 5-0 6  Hypertension- She takes diltiazem and torsemide  BP acceptable  CHF- She takes torsemide 20mg daily  Follow up in 4 months  Please call the office with any questions or concerns  Reason for Visit: Follow-up [facility] follow up)    HPI: aJsmeet Mccurdy is a 80 y o  female who is here for follow up of hospitalization in July for a fall with T3 fracture and multiple rib fractures  These were deemed non operational   Nephrology was on board for hyponatremia with a sodium level of 127 on admission which improved to 136 on discharge  She has been in a lot of pain still but is hopeful for improvement  She presents from a rehab facility and is in a wheelchair  She denies SOB, N/V/D, LE edema, changes in appetite    She thinks she drinks about 10 8oz glasses of liquids per day  ROS: A complete review of systems was performed and was negative unless otherwise noted in the history of present illness      Allergies:   5ht3 receptor antagonists, Codeine, and Penicillins    Medications:     Current Outpatient Medications:     acetaminophen (TYLENOL) 325 mg tablet, Take 2 tablets (650 mg total) by mouth every 6 (six) hours as needed for mild pain, Disp: 30 tablet, Rfl: 0    atorvastatin (LIPITOR) 20 mg tablet, Take 20 mg by mouth daily, Disp: , Rfl:     clopidogrel (PLAVIX) 75 mg tablet, Take 75 mg by mouth daily, Disp: , Rfl:     cyanocobalamin 1,000 mcg/mL, Inject 1,000 mcg into a muscle every 30 (thirty) days, Disp: , Rfl:     Diclofenac Sodium (VOLTAREN) 1 %, Apply 2 g topically 4 (four) times a day, Disp: , Rfl:     diltiazem (CARDIZEM) 120 MG tablet, Take 120 mg by mouth every 6 (six) hours, Disp: , Rfl:     docusate sodium (COLACE) 100 mg capsule, Take 1 capsule (100 mg total) by mouth 2 (two) times a day, Disp: , Rfl: 0    ergocalciferol (VITAMIN D2) 50,000 units, Take 50,000 Units by mouth once a week, Disp: , Rfl:     gabapentin (NEURONTIN) 100 mg capsule, Take 100 mg by mouth, Disp: , Rfl:     glucose blood test strip, Use 1 each daily as needed Use as instructed, Disp: , Rfl:     guaiFENesin (MUCINEX) 600 mg 12 hr tablet, Take 1 tablet (600 mg total) by mouth every 12 (twelve) hours, Disp: , Rfl: 0    levothyroxine 50 mcg tablet, Take 50 mcg by mouth daily, Disp: , Rfl:     lidocaine (LIDODERM) 5 %, Apply 2 patches topically daily Remove & Discard patch within 12 hours or as directed by MD, Disp: , Rfl: 0    memantine (NAMENDA) 10 mg tablet, Take 10 mg by mouth 2 (two) times a day, Disp: , Rfl:     methocarbamol (ROBAXIN) 500 mg tablet, Take 0 5 tablets (250 mg total) by mouth every 6 (six) hours as needed for muscle spasms, Disp: , Rfl: 0    metoprolol tartrate (LOPRESSOR) 25 mg tablet, Take 25 mg by mouth daily, Disp: , Rfl:     Multiple Vitamins-Minerals (Sentry Senior) TABS, Take by mouth, Disp: , Rfl:     oxyCODONE (ROXICODONE) 5 immediate release tablet, You may take 2 5mg (0 5 tab) for moderate pain or 5mg (1 tab) for severe pain, every 4 hours, as needed  , Disp: 20 tablet, Rfl: 0    pantoprazole (PROTONIX) 40 mg tablet, Take 40 mg by mouth daily, Disp: , Rfl:     sodium chloride 1 g tablet, Take 2 tablets (2 g total) by mouth 2 (two) times a day with meals, Disp: 120 tablet, Rfl: 2    torsemide (DEMADEX) 20 mg tablet, Take 1 tablet (20 mg total) by mouth daily, Disp: , Rfl: 0    Past Medical History:   Diagnosis Date    CHF (congestive heart failure) (HCC)     Dementia without behavioral disturbance (HCC)     High cholesterol     Hypertension     Hypothyroid     Stage [facility], [facility]     St[facility] ([facility] ) 09/21/2021     Past Surgical History:   Procedure Laterality Date    APPENDECTOMY      CHOLECYSTECTOMY      NEPHRECTOMY Left     TOTAL ABDOMINAL HYSTERECTOMY W/ BILATERAL SALPINGOOPHORECTOMY       Family History   Problem Relation Age of Onset    Diabetes Mother     Cancer Sister       reports that she has never smoked  She has never used smokeless tobacco  She reports previous alcohol use  She reports that she does not use drugs  Physical Exam:   Vitals:    09/28/22 1321   BP: 130/64   BP Location: Right arm   Patient Position: Sitting   Cuff Size: Large   Pulse: 80     There is no height or weight on file to calculate BMI  General: NAD  Neuro: AAO  Skin: no rash  Eyes: anicteric  ENMT: mm moist  Neck: no masses  Respiratory: CTAB  Cardiovascular: RRR  Extremities: no edema  Gastrointestinal: soft nt nd    Procedure:  No results found for this or any previous visit      Lab Results   Component Value Date    GLUCOSE 104 07/14/2022    CALCIUM 9 1 07/30/2022    K 4 0 07/30/2022    CO2 37 (H) 07/30/2022    CL 96 07/30/2022    BUN 16 07/30/2022    CREATININE 0 56 (L) 07/30/2022     I have personally reviewed the blood work as stated above and in my note  I have personally reviewed renal hospital notes

## 2022-09-28 NOTE — PATIENT INSTRUCTIONS
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

## 2022-10-27 ENCOUNTER — HOSPITAL ENCOUNTER (OUTPATIENT)
Dept: RADIOLOGY | Facility: HOSPITAL | Age: 87
Discharge: HOME | End: 2022-10-27
Payer: COMMERCIAL

## 2022-10-27 ENCOUNTER — OFFICE VISIT (OUTPATIENT)
Dept: NEUROSURGERY | Facility: CLINIC | Age: 87
End: 2022-10-27
Payer: COMMERCIAL

## 2022-10-27 VITALS
TEMPERATURE: 98.1 F | RESPIRATION RATE: 18 BRPM | HEIGHT: 62 IN | DIASTOLIC BLOOD PRESSURE: 76 MMHG | BODY MASS INDEX: 32.74 KG/M2 | SYSTOLIC BLOOD PRESSURE: 124 MMHG | HEART RATE: 80 BPM

## 2022-10-27 DIAGNOSIS — M54.50 LOWER BACK PAIN: Primary | ICD-10-CM

## 2022-10-27 DIAGNOSIS — S22.079A T9 VERTEBRAL FRACTURE (HCC): ICD-10-CM

## 2022-10-27 DIAGNOSIS — S22.039A CLOSED T3 FRACTURE (HCC): ICD-10-CM

## 2022-10-27 DIAGNOSIS — M25.512 LEFT SHOULDER PAIN: ICD-10-CM

## 2022-10-27 PROCEDURE — 99213 OFFICE O/P EST LOW 20 MIN: CPT | Performed by: PHYSICIAN ASSISTANT

## 2022-10-27 PROCEDURE — 72072 X-RAY EXAM THORAC SPINE 3VWS: CPT

## 2022-10-27 RX ORDER — PREDNISONE 10 MG/1
TABLET ORAL
COMMUNITY
Start: 2022-10-24

## 2022-10-27 NOTE — PROGRESS NOTES
Neurosurgery Office Note  Reji Frances 80 y o  female MRN: [id number]      Assessment/Plan     Patient is 80years old  with history of  Traumatic T3, T4, T9 and T11 compression fracture, here today for 11-12 weeks follow up  Hx of fall  Patient is not wearing brace, originally she has also associated left ribs fracture  Patient reports more lower back pain and occasional upper back pain, and  sciatica  She also complains left shoulder pain  No weakness in the extremities, numbness or paresthesia  B/B dysfunction nor saddle anaesthesia  Patient is not a good historian  Exam-Patient Alert and communicates well, but not good historian   She is in-consistant with her history  Victor M  Strength 5/5 bilaterally  DTR 2+  And no clonus bilaterally  Tenderness in the lower lumbar region noted on palpation  Hx, PEx, and images reviewed and Mx plan discussed  Patient could have underlying spondylosis  I would recommend referral to pain Mx, Flexion extension Thoracic spine xrays  Continue PT and Tylenol for pain  All questions and concerns were answered  Patient and care giver expressed their understandings and agreed with the plan  Plan:   1  Flexion-extension Thoracic spine x-rays/2 weeks  2  Ambulatory referral to pain management  3  Follow-up in 2 weeks  4  Fall precautions, avoid lifting heavy objects, excessive bending or twisting  5  Call with questions or concerns    I spent 30 minutes with the patient today in which >50% of the time was spent counseling/coordination of care regarding diagnosis, imaging review, symptoms and treatment plan  C/C: 'Thoracic VB fracture F/U"    History of Present Illness     All patient's medical histories were reviewed and updated as appropriate: Allergies, current medication list, past medical history, past surgical history, family history, social history, and current medical lists      Patient is here for 3 months follow-up of multiple thoracic compression fracture following fall  Patient is poor historian, reports moderate to severe pain, mostly in the lower lumbar region  Occasional sciatica without numbness, paresthesia or weakness  Patient is not wearing  brace, because he had left ribs fracture  She is doing PT, taking Tylenol for pain  Baseline gait issues, uses walker for ambulation  No fever, chills, rigors, cough or chest pain  REVIEW OF SYSTEMS  ROS personally reviewed and updated  Review of Systems   Constitutional: Negative  HENT: Negative  Eyes: Negative  Respiratory: Negative  On  O2   Cardiovascular: Negative  Gastrointestinal: Negative  Endocrine: Negative  Genitourinary: Negative  Musculoskeletal: Positive for back pain and myalgias  T3  Fx-- S/p fall and fall on 7/23/22--3 fractured ribs  Skin: Negative  Allergic/Immunologic: Negative  Neurological: Positive for numbness  Negative for weakness  Hematological: Negative  Psychiatric/Behavioral: Negative  Negative for sleep disturbance  All other systems reviewed and are negative          Meds/Allergies     Current Outpatient Medications   Medication Sig Dispense Refill   • acetaminophen (TYLENOL) 325 mg tablet Take 2 tablets (650 mg total) by mouth every 6 (six) hours as needed for mild pain 30 tablet 0   • atorvastatin (LIPITOR) 20 mg tablet Take 20 mg by mouth daily     • clopidogrel (PLAVIX) 75 mg tablet Take 75 mg by mouth daily     • cyanocobalamin 1,000 mcg/mL Inject 1,000 mcg into a muscle every 30 (thirty) days     • Diclofenac Sodium (VOLTAREN) 1 % Apply 2 g topically 4 (four) times a day     • diltiazem (CARDIZEM) 120 MG tablet Take 120 mg by mouth every 6 (six) hours     • docusate sodium (COLACE) 100 mg capsule Take 1 capsule (100 mg total) by mouth 2 (two) times a day  0   • ergocalciferol (VITAMIN D2) 50,000 units Take 50,000 Units by mouth once a week     • gabapentin (NEURONTIN) 100 mg capsule Take 100 mg by mouth     • glucose blood test strip Use 1 each daily as needed Use as instructed     • guaiFENesin (MUCINEX) 600 mg 12 hr tablet Take 1 tablet (600 mg total) by mouth every 12 (twelve) hours  0   • levothyroxine 50 mcg tablet Take 50 mcg by mouth daily     • lidocaine (LIDODERM) 5 % Apply 2 patches topically daily Remove & Discard patch within 12 hours or as directed by MD  0   • memantine (NAMENDA) 10 mg tablet Take 10 mg by mouth 2 (two) times a day     • methocarbamol (ROBAXIN) 500 mg tablet Take 0 5 tablets (250 mg total) by mouth every 6 (six) hours as needed for muscle spasms  0   • metoprolol tartrate (LOPRESSOR) 25 mg tablet Take 25 mg by mouth daily     • Multiple Vitamins-Minerals ([employer]) TABS Take by mouth     • oxyCODONE (ROXICODONE) 5 immediate release tablet You may take 2 5mg (0 5 tab) for moderate pain or 5mg (1 tab) for severe pain, every 4 hours, as needed  20 tablet 0   • pantoprazole (PROTONIX) 40 mg tablet Take 40 mg by mouth daily     • sodium chloride 1 g tablet Take 2 tablets (2 g total) by mouth 2 (two) times a day with meals 120 tablet 2   • torsemide (DEMADEX) 20 mg tablet Take 1 tablet (20 mg total) by mouth daily  0     No current facility-administered medications for this visit         Allergies   Allergen Reactions   • 5ht3 Receptor Antagonists Hives   • Codeine Hives   • Penicillins Hives       PAST HISTORY    Past Medical History:   Diagnosis Date   • CHF (congestive heart failure) (HCC)    • Dementia without behavioral disturbance (HCC)    • High cholesterol    • Hypertension    • Hypothyroid    • Stage 3 chronic kidney disease, unspecified whether stage 3a or 3b CKD ([facility] [address] )    • Stroke ([facility] ) 09/21/2021       Past Surgical History:   Procedure Laterality Date   • APPENDECTOMY     • CHOLECYSTECTOMY     • NEPHRECTOMY Left    • TOTAL ABDOMINAL HYSTERECTOMY W/ BILATERAL SALPINGOOPHORECTOMY         Social History     Tobacco Use   • Smoking status: Never Smoker   • Smokeless tobacco: Never Used   Vaping Use • Vaping Use: Never used   Substance Use Topics   • Alcohol use: Not Currently   • Drug use: Never       Family History   Problem Relation Age of Onset   • Diabetes Mother    • Cancer Sister          Above history personally reviewed  EXAM    Vitals: There were no vitals taken for this visit  ,There is no height or weight on file to calculate BMI  Physical Exam  Constitutional:       Appearance: She is obese  HENT:      Head: Normocephalic and atraumatic  Eyes:      Extraocular Movements: Extraocular movements intact  Cardiovascular:      Rate and Rhythm: Normal rate and regular rhythm  Pulses: Normal pulses  Pulmonary:      Effort: Pulmonary effort is normal    Musculoskeletal:         General: Tenderness present  Cervical back: Normal range of motion  Neurological:      General: No focal deficit present  Mental Status: She is alert  GCS: GCS eye subscore is 4  GCS verbal subscore is 5  GCS motor subscore is 6  Cranial Nerves: Cranial nerves are intact  Sensory: Sensation is intact  Motor: Motor function is intact  Deep Tendon Reflexes: Reflexes are normal and symmetric  Reflex Scores:       Tricep reflexes are 2+ on the right side and 2+ on the left side  Bicep reflexes are 2+ on the right side and 2+ on the left side  Brachioradialis reflexes are 2+ on the right side and 2+ on the left side  Patellar reflexes are 2+ on the right side and 2+ on the left side  Achilles reflexes are 2+ on the right side and 2+ on the left side    Psychiatric:         Speech: Speech normal          Neurologic Exam     Mental Status   Speech: speech is normal   Level of consciousness: alert    Cranial Nerves     CN III, IV, VI   Nystagmus: none     CN XI   CN XI normal      Motor Exam   Muscle bulk: normal  Overall muscle tone: normal  Right arm tone: normal  Left arm tone: normal  Right arm pronator drift: absent  Left arm pronator drift: absent  Right leg tone: normal  Left leg tone: normal    Sensory Exam   Light touch normal      Gait, Coordination, and Reflexes     Reflexes   Right brachioradialis: 2+  Left brachioradialis: 2+  Right biceps: 2+  Left biceps: 2+  Right triceps: 2+  Left triceps: 2+  Right patellar: 2+  Left patellar: 2+  Right achilles: 2+  Left achilles: 2+  Right : 2+  Left : 2+  Right Cleaning: absent  Left Cleaning: absent  Right ankle clonus: absent  Left pendular knee jerk: absent        MEDICAL DECISION MAKING    Imaging Studies:     No results found      I have personally reviewed pertinent reports   , I have personally reviewed pertinent films in PACS and I have personally reviewed pertinent films in PACS with a Radiologist

## 2022-11-13 ENCOUNTER — APPOINTMENT (OUTPATIENT)
Dept: RADIOLOGY | Facility: MEDICAL CENTER | Age: 87
End: 2022-11-13

## 2022-11-13 DIAGNOSIS — M54.50 LOWER BACK PAIN: ICD-10-CM

## 2022-11-13 DIAGNOSIS — S22.079A T9 VERTEBRAL FRACTURE (HCC): ICD-10-CM

## 2022-11-16 ENCOUNTER — OFFICE VISIT (OUTPATIENT)
Dept: NEUROSURGERY | Facility: CLINIC | Age: 87
End: 2022-11-16

## 2022-11-16 VITALS
BODY MASS INDEX: 30.73 KG/M2 | RESPIRATION RATE: 18 BRPM | TEMPERATURE: 98.2 F | HEART RATE: 80 BPM | DIASTOLIC BLOOD PRESSURE: 74 MMHG | WEIGHT: 167 LBS | SYSTOLIC BLOOD PRESSURE: 140 MMHG | HEIGHT: 62 IN

## 2022-11-16 DIAGNOSIS — S22.000A THORACIC COMPRESSION FRACTURE (HCC): Primary | ICD-10-CM

## 2022-11-16 NOTE — PROGRESS NOTES
Neurosurgery Office Note  Ksenia Finley 80 y o  female MRN: [id number]      Assessment/Plan      Patient is 80years old  with history of  Traumatic T3, T4, T9 and T11 compression fracture, here today for 14 weeks follow up  Hx of fall  Patient is not wearing brace, originally she has also associated left ribs fracture  Patient reports more lower back pain and occasional sciatica on the elft  Hx of L3-5 posterior Lumbar fusion  No weakness in the extremities, numbness or paresthesia  Denies B/B dysfunction nor saddle anaesthesia  She takes Tylenol for pain  She has chronic back pain  Exam-Alert and communicates well, strength 5/5 bilaterally  Sensation to LT intact bilaterally  DTR 2+ without clonus bilaterally  Slight discomfort on palpation of posterior thoracic spine  Hx, PEx, and images reviewed with the patient and care giver  Mx plan discussed  Patient's back pain improved  Exam non focal  She is not wearing brace  X-rays stable  T11 and chronic T9 fractures  From NSx perspective, no regular follow up or imaging required  Patient can follow up on PRN basis  Fall precaution  Avoid Lifting heavy objects, excessive bending or twisting  All questions and concerns were answered  Patient expressed her understandings and agreed with the plan  Plan:   1  Patient without much back pain  2  Upright Thoracic spine x-rays stable multiple lower thoracic #  3  No regular F/U or imaging is required  4  Cleared, and follow up on PRN basis  5  Call with questions or concerns  I spent 30  minutes with the patient today in which >50% of the time was spent counseling/coordination of care regarding diagnosis, imaging review, symptoms and treatment plan  C/C:  " 3 months F/U for multiple Thoracic fracture"    History of Present Illness     All patients medical histories were reviewed and updated as appropriate:     Allergies, current medication list, past medical history, past surgical history, family history, social history, and current medical lists  Patient is here today for 14 weeks follow-up status post traumatic T9 and T11 compression fracture and chronic T3 and T4 fractures  Had flexion-extension  lumbar spine x-rays which demonstrated stable T11 compression fracture and intact hardware of  L3-L5  Ruth Ann Velásquez Patient reports improvement with her back  She is not wearing TLSO  She has occasional left leg sciatica  Denies numbness or weakness  Walks with a walker  REVIEW OF SYSTEMS  ROS personally reviewed and updated  Review of Systems   Constitutional: Negative  HENT: Negative  Eyes: Negative  Respiratory: Negative  Cardiovascular: Negative  Gastrointestinal: Negative  Endocrine: Negative  Genitourinary: Negative  Musculoskeletal: Positive for back pain (non radiating) and gait problem (uses a walker)  Negative for myalgias  T3  Fx-- S/p fall  on 7/23/22--3 fractured ribs  Skin: Negative  Allergic/Immunologic: Negative  Neurological: Negative for weakness and numbness  Hematological: Negative  Psychiatric/Behavioral: Negative  Self-injury:                        Suicidal ideas: All other systems reviewed and are negative          Meds/Allergies     Current Outpatient Medications   Medication Sig Dispense Refill   • acetaminophen (TYLENOL) 325 mg tablet Take 2 tablets (650 mg total) by mouth every 6 (six) hours as needed for mild pain 30 tablet 0   • atorvastatin (LIPITOR) 20 mg tablet Take 20 mg by mouth daily     • clopidogrel (PLAVIX) 75 mg tablet Take 75 mg by mouth daily     • cyanocobalamin 1,000 mcg/mL Inject 1,000 mcg into a muscle every 30 (thirty) days     • Diclofenac Sodium (VOLTAREN) 1 % Apply 2 g topically 4 (four) times a day     • diltiazem (CARDIZEM) 120 MG tablet Take 120 mg by mouth every 6 (six) hours     • docusate sodium (COLACE) 100 mg capsule Take 1 capsule (100 mg total) by mouth 2 (two) times a day  0   • ergocalciferol (VITAMIN D2) 50,000 units Take 50,000 Units by mouth once a week     • gabapentin (NEURONTIN) 100 mg capsule Take 100 mg by mouth     • glucose blood test strip Use 1 each daily as needed Use as instructed     • guaiFENesin (MUCINEX) 600 mg 12 hr tablet Take 1 tablet (600 mg total) by mouth every 12 (twelve) hours  0   • levothyroxine 50 mcg tablet Take 50 mcg by mouth daily     • lidocaine (LIDODERM) 5 % Apply 2 patches topically daily Remove & Discard patch within 12 hours or as directed by MD  0   • memantine (NAMENDA) 10 mg tablet Take 10 mg by mouth 2 (two) times a day     • methocarbamol (ROBAXIN) 500 mg tablet Take 0 5 tablets (250 mg total) by mouth every 6 (six) hours as needed for muscle spasms  0   • metoprolol tartrate (LOPRESSOR) 25 mg tablet Take 25 mg by mouth daily     • Multiple Vitamins-Minerals ([employer]) TABS Take by mouth     • oxyCODONE (ROXICODONE) 5 immediate release tablet You may take 2 5mg (0 5 tab) for moderate pain or 5mg (1 tab) for severe pain, every 4 hours, as needed  20 tablet 0   • pantoprazole (PROTONIX) 40 mg tablet Take 40 mg by mouth daily     • predniSONE 10 mg tablet      • sodium chloride 1 g tablet Take 2 tablets (2 g total) by mouth 2 (two) times a day with meals 120 tablet 2   • torsemide (DEMADEX) 20 mg tablet Take 1 tablet (20 mg total) by mouth daily  0     No current facility-administered medications for this visit         Allergies   Allergen Reactions   • 5ht3 Receptor Antagonists Hives   • Codeine Hives   • Penicillins Hives       PAST HISTORY    Past Medical History:   Diagnosis Date   • CHF (congestive heart failure) (HCC)    • Dementia without behavioral disturbance (HCC)    • High cholesterol    • Hypertension    • Hypothyroid    • Stage 3 chronic kidney disease, unspecified whether stage 3a or 3b CKD ([facility] [address] )    • Stroke ([facility] [address] ) 09/21/2021       Past Surgical History:   Procedure Laterality Date   • APPENDECTOMY     • CHOLECYSTECTOMY     • NEPHRECTOMY Left    • TOTAL ABDOMINAL HYSTERECTOMY W/ BILATERAL SALPINGOOPHORECTOMY         Social History     Tobacco Use   • Smoking status: Never   • Smokeless tobacco: Never   Vaping Use   • Vaping Use: Never used   Substance Use Topics   • Alcohol use: Not Currently   • Drug use: Never       Family History   Problem Relation Age of Onset   • Diabetes Mother    • Cancer Sister          Above history personally reviewed  EXAM    Vitals: There were no vitals taken for this visit  ,There is no height or weight on file to calculate BMI  Physical Exam  Constitutional:       Appearance: Normal appearance  HENT:      Head: Normocephalic and atraumatic  Cardiovascular:      Rate and Rhythm: Normal rate and regular rhythm  Pulses: Normal pulses  Pulmonary:      Effort: Pulmonary effort is normal    Musculoskeletal:         General: Tenderness present  Cervical back: Normal range of motion and neck supple  Neurological:      General: No focal deficit present  Mental Status: She is alert and oriented to person, place, and time  GCS: GCS eye subscore is 4  GCS verbal subscore is 5  GCS motor subscore is 6  Cranial Nerves: Cranial nerves 2-12 are intact  Sensory: Sensation is intact  Motor: Motor function is intact  Coordination: Finger-Nose-Finger Test normal       Deep Tendon Reflexes: Reflexes are normal and symmetric  Reflex Scores:       Tricep reflexes are 2+ on the right side and 2+ on the left side  Bicep reflexes are 2+ on the right side and 2+ on the left side  Brachioradialis reflexes are 2+ on the right side and 2+ on the left side  Patellar reflexes are 2+ on the right side and 2+ on the left side  Achilles reflexes are 2+ on the right side and 2+ on the left side  Psychiatric:         Speech: Speech normal          Neurologic Exam     Mental Status   Oriented to person, place, and time     Speech: speech is normal   Level of consciousness: alert    Cranial Nerves   Cranial nerves II through XII intact  CN III, IV, VI   Nystagmus: none     CN XI   CN XI normal      Motor Exam   Muscle bulk: normal  Overall muscle tone: normal  Right arm tone: normal  Left arm tone: normal  Right arm pronator drift: absent  Left arm pronator drift: absent  Right leg tone: normal  Left leg tone: normal    Sensory Exam   Light touch normal      Gait, Coordination, and Reflexes     Coordination   Finger to nose coordination: normal    Reflexes   Right brachioradialis: 2+  Left brachioradialis: 2+  Right biceps: 2+  Left biceps: 2+  Right triceps: 2+  Left triceps: 2+  Right patellar: 2+  Left patellar: 2+  Right achilles: 2+  Left achilles: 2+  Right : 2+  Left : 2+  Right Cleaning: absent  Left Cleaning: absent  Right ankle clonus: absent  Left pendular knee jerk: absent        MEDICAL DECISION MAKING    Imaging Studies:     XR spine thoracic 3 vw    Result Date: 10/27/2022  Narrative: THORACIC SPINE INDICATION:   S22 039A: Unspecified fracture of third thoracic vertebra, initial encounter for closed fracture  COMPARISON:  9/21/2022 VIEWS:  XR SPINE THORACIC 3 VW FINDINGS: Known T3, T4 compression deformities not seen due to technique, positioning and overlying lung markings  Unchanged T9 vertebra plana  Unchanged T11 anterior inferior wedge compression fracture  Lower lumbar posterior hardware fusion hardware partially imaged  There is no new acute fracture or pathologic bone lesion  Thoracic vertebral alignment is within normal limits  No significant degenerative changes  There is no displacement of the paraspinal line  The pedicles appear intact       Impression: No change T9, T11 fractures Workstation performed: [id number]       I have personally reviewed pertinent reports   , I have personally reviewed pertinent films in PACS and I have personally reviewed pertinent films in PACS with a Radiologist

## 2023-01-31 ENCOUNTER — TELEPHONE (OUTPATIENT)
Dept: NEPHROLOGY | Facility: CLINIC | Age: 88
End: 2023-01-31

## 2023-01-31 NOTE — TELEPHONE ENCOUNTER
Called at [address] to remained to please have blood work done before the follow up appointment

## 2023-02-02 ENCOUNTER — TELEPHONE (OUTPATIENT)
Dept: NEPHROLOGY | Facility: CLINIC | Age: 88
End: 2023-02-02

## 2023-02-02 NOTE — TELEPHONE ENCOUNTER
Called at [facility] to remained to please patient have blood work done before the follow up appointment

## 2023-02-07 ENCOUNTER — OFFICE VISIT (OUTPATIENT)
Dept: NEPHROLOGY | Facility: CLINIC | Age: 88
End: 2023-02-07

## 2023-02-07 ENCOUNTER — TELEPHONE (OUTPATIENT)
Dept: NEPHROLOGY | Facility: CLINIC | Age: 88
End: 2023-02-07

## 2023-02-07 VITALS
BODY MASS INDEX: 31.47 KG/M2 | HEIGHT: 62 IN | DIASTOLIC BLOOD PRESSURE: 80 MMHG | WEIGHT: 171 LBS | SYSTOLIC BLOOD PRESSURE: 130 MMHG

## 2023-02-07 DIAGNOSIS — E22.2 SIADH (SYNDROME OF INAPPROPRIATE ADH PRODUCTION) (HCC): ICD-10-CM

## 2023-02-07 DIAGNOSIS — I50.32 CHRONIC DIASTOLIC CONGESTIVE HEART FAILURE (HCC): ICD-10-CM

## 2023-02-07 DIAGNOSIS — E87.8 NARROW ANION GAP: ICD-10-CM

## 2023-02-07 DIAGNOSIS — E87.1 HYPONATREMIA: Primary | ICD-10-CM

## 2023-02-07 DIAGNOSIS — E87.3 METABOLIC ALKALOSIS: ICD-10-CM

## 2023-02-07 DIAGNOSIS — D53.9 ANEMIA ASSOCIATED WITH NUTRITIONAL DEFICIENCY: ICD-10-CM

## 2023-02-07 DIAGNOSIS — I10 PRIMARY HYPERTENSION: Chronic | ICD-10-CM

## 2023-02-07 RX ORDER — FAMOTIDINE 40 MG/1
40 TABLET, FILM COATED ORAL DAILY
COMMUNITY

## 2023-02-07 RX ORDER — TIZANIDINE 2 MG/1
2 TABLET ORAL EVERY 8 HOURS PRN
COMMUNITY

## 2023-02-07 RX ORDER — POLYETHYLENE GLYCOL 3350 17 G/17G
17 POWDER, FOR SOLUTION ORAL DAILY
COMMUNITY

## 2023-02-07 RX ORDER — PROMETHAZINE HYDROCHLORIDE 6.25 MG/5ML
SYRUP ORAL 4 TIMES DAILY PRN
COMMUNITY

## 2023-02-07 RX ORDER — DILTIAZEM HYDROCHLORIDE 120 MG/1
120 CAPSULE, EXTENDED RELEASE ORAL DAILY
COMMUNITY

## 2023-02-07 RX ORDER — ONDANSETRON 4 MG/1
4 TABLET, FILM COATED ORAL EVERY 8 HOURS PRN
COMMUNITY

## 2023-02-07 NOTE — PROGRESS NOTES
NEPHROLOGY OUTPATIENT PROGRESS NOTE   Pat Garner 80 y o  female MRN: [id number]  DATE: 2/10/2023    Reason for visit:   Chief Complaint   Patient presents with   • Follow-up     Hyponatremia        Patient Instructions   Thank you for coming to your visit today  As we discussed you kidney function is normal, your sodium is back to normal  Please follow the recommendations below       • Recommend decrease sodium chloride to 1 gr tid     • Avoid nonsteroidal anti-inflammatory drugs such as Naprosyn, ibuprofen, Aleve, Advil, Celebrex, Meloxicam (Mobic) etc   You can use Tylenol as needed if you do not have any liver condition to be concerned about    • Try to avoid medications such as pantoprazole or  Protonix/Nexium or Esomeprazole)/Prilosec or omeprazole/Prevacid or lansoprazole/AcipHex or Rabeprazole  If you are able to, use Pepcid as this is safer for your kidneys  • Try to exercise at least 30 minutes 3 days a week to begin with with an ultimate goal of 5 days a week for at least 30 minutes    Next Visit in 4 months with results   If you need to see us earlier we can change the appointment for you      Nilson Cooper MD  Nephrology Attending             Enriqueta Martinez was seen today for follow-up  Diagnoses and all orders for this visit:    Hyponatremia  -     Basic metabolic panel; Future  -     sodium chloride 1 g tablet; Take 1 tablet (1 g total) by mouth 3 (three) times a day    Chronic congestive heart failure, unspecified heart failure type (HCC)    Anemia associated with nutritional deficiency  -     Iron Panel (Includes Ferritin, Iron Sat%, Iron, and TIBC); Future    Narrow anion gap  -     Protein electrophoresis, serum; Future  -     Immunoglobulin free LT chains blood    Metabolic alkalosis    Chronic diastolic congestive heart failure (HCC)    Primary hypertension        Assessment/Plan:  81 yo woman with PMH  solitary right kidney s/p left nephrectomy, hypothyroidism, dCHF   Patent was admitted to 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   • Plan:  • Sodium stable   • Continue fluid restriction 1/5L a day   • Decrease soidum chloride to 1gr tid   • Will monitor BMP      #Volume status/hypertension:  • Volume:euvolemic  • Blood pressure:normotensive 130/80mmHg, goal <140/90  • Recommend:  • Low sodium diet  • Conitnue   o Diltiazem  o Metoprolol  o Torsemide 20mg daily     #Low anion gap  · AG 3  · Will check SPEP, K/L ratio    #Metabolic alkalosis   · HCO3 10HTMH/H  · Metabolic alkalosis likely secondary to vascular contraction in the settings of diuretics  · continue Torsemide for now     #dCHF  · Low sodium diet  · Diuretics as above   · Monitor weight     #Anemia  · Hgb 10 7mh/dL  · Will check iron panel       SUBJECTIVE / INTERVAL HISTORY:  80 y o  female presents in follow up of hyponatremia and hypertension   Patient is taking sodium chloride 2 tab tid  Feels well, no SOB, no CP, no recent hospitazliations     [address] denies any recent illness/hospitalizations/medication changes since last office visit  Review of Systems   Constitutional: Negative for activity change  HENT: Negative for congestion  Eyes: Negative for discharge  Respiratory: Negative for shortness of breath  Cardiovascular: Negative for chest pain and leg swelling  Gastrointestinal: Negative for abdominal pain  Endocrine: Negative for cold intolerance  Genitourinary: Negative for dysuria  Musculoskeletal: Negative for arthralgias  Skin: Negative for color change and pallor  Neurological: Negative for dizziness  Hematological: Negative for adenopathy  Psychiatric/Behavioral: Negative for agitation  OBJECTIVE:  /80   Ht 5' 2" (1 575 m)   Wt 77 6 kg (171 lb)   BMI 31 28 kg/m²  Body mass index is 31 28 kg/m²      Physical exam:  Physical Exam   General:  , no acute distress at this time  Skin:  No acute rash  Eyes:  No scleral icterus and noninjected  ENT:  mucous membranes moist  Neck:  no carotid bruits  Chest:  Clear to auscultation percussion, good respiratory effort, no use of accessory respiratory muscles  CVS:  Regular rate and rhythm without rub   Abdomen:  soft and nontender   Extremities:lower extremity edema 1+  Neuro:  No gross focality  Psych:  Alert , cooperative       Medications:    Current Outpatient Medications:   •  acetaminophen (TYLENOL) 325 mg tablet, Take 2 tablets (650 mg total) by mouth every 6 (six) hours as needed for mild pain (Patient taking differently: Take 650 mg by mouth every 8 (eight) hours as needed for mild pain), Disp: 30 tablet, Rfl: 0  •  atorvastatin (LIPITOR) 20 mg tablet, Take 20 mg by mouth daily, Disp: , Rfl:   •  clopidogrel (PLAVIX) 75 mg tablet, Take 75 mg by mouth daily, Disp: , Rfl:   •  cyanocobalamin 1,000 mcg/mL, Inject 1,000 mcg into a muscle every 30 (thirty) days, Disp: , Rfl:   •  Diclofenac Sodium (VOLTAREN) 1 %, Apply 2 g topically 4 (four) times a day, Disp: , Rfl:   •  diltiazem (DILACOR XR) 120 MG 24 hr capsule, Take 120 mg by mouth daily, Disp: , Rfl:   •  docusate sodium (COLACE) 100 mg capsule, Take 1 capsule (100 mg total) by mouth 2 (two) times a day, Disp: , Rfl: 0  •  ergocalciferol (VITAMIN D2) 50,000 units, Take 50,000 Units by mouth once a week, Disp: , Rfl:   •  famotidine (PEPCID) 40 MG tablet, Take 40 mg by mouth daily, Disp: , Rfl:   •  gabapentin (NEURONTIN) 100 mg capsule, Take 100 mg by mouth 4 (four) times a day, Disp: , Rfl:   •  glucose blood test strip, Use 1 each daily as needed Use as instructed, Disp: , Rfl:   •  guaiFENesin (MUCINEX) 600 mg 12 hr tablet, Take 1 tablet (600 mg total) by mouth every 12 (twelve) hours, Disp: , Rfl: 0  •  levothyroxine 50 mcg tablet, Take 50 mcg by mouth daily, Disp: , Rfl:   •  lidocaine (LIDODERM) 5 %, Apply 2 patches topically daily Remove & Discard patch within 12 hours or as directed by MD, Disp: , Rfl: 0  •  memantine (NAMENDA) 10 mg tablet, Take 10 mg by mouth 2 (two) times a day, Disp: , Rfl:   •  methocarbamol (ROBAXIN) 500 mg tablet, Take 0 5 tablets (250 mg total) by mouth every 6 (six) hours as needed for muscle spasms, Disp: , Rfl: 0  •  metoprolol tartrate (LOPRESSOR) 25 mg tablet, Take 25 mg by mouth daily, Disp: , Rfl:   •  Multiple Vitamins-Minerals ([employer]) TABS, Take by mouth, Disp: , Rfl:   •  NON FORMULARY, Super papaya enzyme Take 2 tablets by mouth as needed for upset stomach, Disp: , Rfl:   •  ondansetron (ZOFRAN) 4 mg tablet, Take 4 mg by mouth every 8 (eight) hours as needed for nausea or vomiting, Disp: , Rfl:   •  oxyCODONE (ROXICODONE) 5 immediate release tablet, You may take 2 5mg (0 5 tab) for moderate pain or 5mg (1 tab) for severe pain, every 4 hours, as needed  , Disp: 20 tablet, Rfl: 0  •  pantoprazole (PROTONIX) 40 mg tablet, Take 40 mg by mouth daily, Disp: , Rfl:   •  polyethylene glycol (MIRALAX) 17 g packet, Take 17 g by mouth daily, Disp: , Rfl:   •  promethazine (PHENERGAN) 12 5 mg/10 mL syrup, Take by mouth 4 (four) times a day as needed for nausea or vomiting, Disp: , Rfl:   •  sodium chloride 1 g tablet, Take 1 tablet (1 g total) by mouth 3 (three) times a day, Disp: 120 tablet, Rfl: 2  •  tiZANidine (ZANAFLEX) 2 mg tablet, Take 2 mg by mouth every 8 (eight) hours as needed for muscle spasms, Disp: , Rfl:   •  torsemide (DEMADEX) 20 mg tablet, Take 1 tablet (20 mg total) by mouth daily, Disp: , Rfl: 0  •  diltiazem (CARDIZEM) 120 MG tablet, Take 120 mg by mouth every 6 (six) hours (Patient not taking: Reported on 2/7/2023), Disp: , Rfl:   •  predniSONE 10 mg tablet, , Disp: , Rfl:     Allergies:   Allergies as of 02/07/2023 - Reviewed 02/07/2023   Allergen Reaction Noted   • 5ht3 receptor antagonists Hives 07/13/2021   • Codeine Hives 07/13/2021   • Penicillins Hives 07/23/2022       The following portions of the patient's history were reviewed and updated as appropriate: past family history, past surgical history and problem list     Laboratory Results:  Lab Results   Component Value Date    SODIUM 136 07/30/2022    K 4 0 07/30/2022    CL 96 07/30/2022    CO2 37 (H) 07/30/2022    BUN 16 07/30/2022    CREATININE 0 56 (L) 07/30/2022    GLUC 97 07/30/2022    CALCIUM 9 1 07/30/2022        Lab Results   Component Value Date    CALCIUM 9 1 07/30/2022       Portions of the record may have been created with voice recognition software  Occasional wrong word or "sound a like" substitutions may have occurred due to the inherent limitations of voice recognition software  Read the chart carefully and recognize, using context, where substitutions have occurred

## 2023-02-07 NOTE — PATIENT INSTRUCTIONS
Thank you for coming to your visit today  As we discussed you kidney function is normal, your sodium is back to normal  Please follow the recommendations below       Recommend decrease sodium chloride to 1 gr tid     Avoid nonsteroidal anti-inflammatory drugs such as Naprosyn, ibuprofen, Aleve, Advil, Celebrex, Meloxicam (Mobic) etc   You can use Tylenol as needed if you do not have any liver condition to be concerned about    Try to avoid medications such as pantoprazole or  Protonix/Nexium or Esomeprazole)/Prilosec or omeprazole/Prevacid or lansoprazole/AcipHex or Rabeprazole  If you are able to, use Pepcid as this is safer for your kidneys      Try to exercise at least 30 minutes 3 days a week to begin with with an ultimate goal of 5 days a week for at least 30 minutes    Next Visit in 4 months with results   If you need to see us earlier we can change the appointment for you      Madhu Blandon MD  Nephrology Attending

## 2023-02-10 PROBLEM — E87.8 NARROW ANION GAP: Status: ACTIVE | Noted: 2023-02-10

## 2023-02-10 PROBLEM — N18.31 STAGE 3A CHRONIC KIDNEY DISEASE (HCC): Status: ACTIVE | Noted: 2023-02-10

## 2023-02-10 PROBLEM — E87.3 METABOLIC ALKALOSIS: Status: ACTIVE | Noted: 2023-02-10

## 2023-02-10 PROBLEM — I50.30 DIASTOLIC CHF (HCC): Status: ACTIVE | Noted: 2023-02-10

## 2023-02-10 PROBLEM — D64.89 OTHER SPECIFIED ANEMIAS: Status: ACTIVE | Noted: 2023-02-10

## 2023-02-10 RX ORDER — SODIUM CHLORIDE 1000 MG
1 TABLET, SOLUBLE MISCELLANEOUS 3 TIMES DAILY
Qty: 120 TABLET | Refills: 2 | Status: SHIPPED | OUTPATIENT
Start: 2023-02-10

## 2023-02-11 PROBLEM — E22.2 SIADH (SYNDROME OF INAPPROPRIATE ADH PRODUCTION) (HCC): Status: ACTIVE | Noted: 2023-02-11

## 2023-02-20 ENCOUNTER — TELEPHONE (OUTPATIENT)
Dept: NEPHROLOGY | Facility: CLINIC | Age: 88
End: 2023-02-20

## 2023-02-20 DIAGNOSIS — E87.1 HYPONATREMIA: ICD-10-CM

## 2023-02-20 RX ORDER — SODIUM CHLORIDE 1000 MG
1 TABLET, SOLUBLE MISCELLANEOUS DAILY
Qty: 120 TABLET | Refills: 2 | Status: SHIPPED | OUTPATIENT
Start: 2023-02-20

## 2023-05-13 ENCOUNTER — APPOINTMENT (EMERGENCY)
Dept: CT IMAGING | Facility: HOSPITAL | Age: 88
End: 2023-05-13

## 2023-05-13 ENCOUNTER — APPOINTMENT (EMERGENCY)
Dept: RADIOLOGY | Facility: HOSPITAL | Age: 88
End: 2023-05-13

## 2023-05-13 ENCOUNTER — HOSPITAL ENCOUNTER (EMERGENCY)
Facility: HOSPITAL | Age: 88
Discharge: HOME | End: 2023-05-13
Attending: EMERGENCY MEDICINE

## 2023-05-13 VITALS
DIASTOLIC BLOOD PRESSURE: 72 MMHG | BODY MASS INDEX: 31.77 KG/M2 | HEART RATE: 91 BPM | OXYGEN SATURATION: 95 % | TEMPERATURE: 98 F | WEIGHT: 173.72 LBS | SYSTOLIC BLOOD PRESSURE: 131 MMHG | RESPIRATION RATE: 22 BRPM

## 2023-05-13 DIAGNOSIS — J47.9 BRONCHIECTASIS (HCC): Primary | ICD-10-CM

## 2023-05-13 LAB
2HR DELTA HS TROPONIN: 1 NG/L
ALBUMIN SERPL BCP-MCNC: 3.8 G/DL (ref 3.5–5)
ALP SERPL-CCNC: 94 U/L (ref 34–104)
ALT SERPL W P-5'-P-CCNC: 16 U/L (ref 7–52)
ANION GAP SERPL CALCULATED.3IONS-SCNC: 6 MMOL/L (ref 4–13)
AST SERPL W P-5'-P-CCNC: 27 U/L (ref 13–39)
BASE EX.OXY STD BLDV CALC-SCNC: 88.6 % (ref 60–80)
BASE EXCESS BLDV CALC-SCNC: 7.1 MMOL/L
BASOPHILS # BLD AUTO: 0.04 THOUSANDS/ÂΜL (ref 0–0.1)
BASOPHILS NFR BLD AUTO: 1 % (ref 0–1)
BILIRUB SERPL-MCNC: 0.6 MG/DL (ref 0.2–1)
BUN SERPL-MCNC: 9 MG/DL (ref 5–25)
CALCIUM SERPL-MCNC: 9.2 MG/DL (ref 8.4–10.2)
CARDIAC TROPONIN I PNL SERPL HS: 10 NG/L
CARDIAC TROPONIN I PNL SERPL HS: 11 NG/L
CHLORIDE SERPL-SCNC: 94 MMOL/L (ref 96–108)
CO2 SERPL-SCNC: 35 MMOL/L (ref 21–32)
CREAT SERPL-MCNC: 0.61 MG/DL (ref 0.6–1.3)
D DIMER PPP FEU-MCNC: 1.77 UG/ML FEU
EOSINOPHIL # BLD AUTO: 0.21 THOUSAND/ÂΜL (ref 0–0.61)
EOSINOPHIL NFR BLD AUTO: 2 % (ref 0–6)
ERYTHROCYTE [DISTWIDTH] IN BLOOD BY AUTOMATED COUNT: 13.6 % (ref 11.6–15.1)
GFR SERPL CREATININE-BSD FRML MDRD: 80 ML/MIN/1.73SQ M
GLUCOSE SERPL-MCNC: 118 MG/DL (ref 65–140)
HCO3 BLDV-SCNC: 33.1 MMOL/L (ref 24–30)
HCT VFR BLD AUTO: 37.3 % (ref 34.8–46.1)
HGB BLD-MCNC: 11.5 G/DL (ref 11.5–15.4)
IMM GRANULOCYTES # BLD AUTO: 0.04 THOUSAND/UL (ref 0–0.2)
IMM GRANULOCYTES NFR BLD AUTO: 1 % (ref 0–2)
LYMPHOCYTES # BLD AUTO: 1.37 THOUSANDS/ÂΜL (ref 0.6–4.47)
LYMPHOCYTES NFR BLD AUTO: 16 % (ref 14–44)
MCH RBC QN AUTO: 27.9 PG (ref 26.8–34.3)
MCHC RBC AUTO-ENTMCNC: 30.8 G/DL (ref 31.4–37.4)
MCV RBC AUTO: 91 FL (ref 82–98)
MONOCYTES # BLD AUTO: 0.57 THOUSAND/ÂΜL (ref 0.17–1.22)
MONOCYTES NFR BLD AUTO: 7 % (ref 4–12)
NEUTROPHILS # BLD AUTO: 6.42 THOUSANDS/ÂΜL (ref 1.85–7.62)
NEUTS SEG NFR BLD AUTO: 73 % (ref 43–75)
NRBC BLD AUTO-RTO: 0 /100 WBCS
O2 CT BLDV-SCNC: 15.4 ML/DL
PCO2 BLDV: 53.2 MM HG (ref 42–50)
PH BLDV: 7.41 [PH] (ref 7.3–7.4)
PLATELET # BLD AUTO: 314 THOUSANDS/UL (ref 149–390)
PMV BLD AUTO: 8.9 FL (ref 8.9–12.7)
PO2 BLDV: 65.8 MM HG (ref 35–45)
POTASSIUM SERPL-SCNC: 3.8 MMOL/L (ref 3.5–5.3)
PROT SERPL-MCNC: 7.5 G/DL (ref 6.4–8.4)
RBC # BLD AUTO: 4.12 MILLION/UL (ref 3.81–5.12)
SODIUM SERPL-SCNC: 135 MMOL/L (ref 135–147)
WBC # BLD AUTO: 8.65 THOUSAND/UL (ref 4.31–10.16)

## 2023-05-13 RX ADMIN — SODIUM CHLORIDE, SODIUM LACTATE, POTASSIUM CHLORIDE, AND CALCIUM CHLORIDE 250 ML: .6; .31; .03; .02 INJECTION, SOLUTION INTRAVENOUS at 14:44

## 2023-05-13 RX ADMIN — IOHEXOL 50 ML: 300 INJECTION, SOLUTION INTRAVENOUS at 15:07

## 2023-05-13 NOTE — DISCHARGE INSTRUCTIONS
Diagnosis: bronchiectasis- chronic lung disease     - please wear your oxygen all the time- titrate flow rate  2-4 lites to keep oxygen saturation above 90 %     - please return to  the er for any fevers- temp > 100 4- any worsening shortness of breath or any new/worsening/concerning symptoms to you

## 2023-05-13 NOTE — ED PROVIDER NOTES
History  Chief Complaint   Patient presents with   • Shortness of Breath     Pt arrives ems from [facility], noted oxygen saturation down to 78%  Chronic 2L nc, brought up above 90%, per ems  HPI    Prior to Admission Medications   Prescriptions Last Dose Informant Patient Reported? Taking?    Diclofenac Sodium (VOLTAREN) 1 %   Yes No   Sig: Apply 2 g topically 4 (four) times a day   Multiple Vitamins-Minerals ([employer]) TABS   Yes No   Sig: Take by mouth   NON FORMULARY   Yes No   Sig: Super papaya enzyme  Take 2 tablets by mouth as needed for upset stomach   acetaminophen (TYLENOL) 325 mg tablet   No No   Sig: Take 2 tablets (650 mg total) by mouth every 6 (six) hours as needed for mild pain   Patient taking differently: Take 650 mg by mouth every 8 (eight) hours as needed for mild pain   atorvastatin (LIPITOR) 20 mg tablet   Yes No   Sig: Take 20 mg by mouth daily   clopidogrel (PLAVIX) 75 mg tablet   Yes No   Sig: Take 75 mg by mouth daily   cyanocobalamin 1,000 mcg/mL   Yes No   Sig: Inject 1,000 mcg into a muscle every 30 (thirty) days   diltiazem (CARDIZEM) 120 MG tablet   Yes No   Sig: Take 120 mg by mouth every 6 (six) hours   Patient not taking: Reported on 2/7/2023   diltiazem (DILACOR XR) 120 MG 24 hr capsule   Yes No   Sig: Take 120 mg by mouth daily   docusate sodium (COLACE) 100 mg capsule   No No   Sig: Take 1 capsule (100 mg total) by mouth 2 (two) times a day   ergocalciferol (VITAMIN D2) 50,000 units   Yes No   Sig: Take 50,000 Units by mouth once a week   famotidine (PEPCID) 40 MG tablet   Yes No   Sig: Take 40 mg by mouth daily   gabapentin (NEURONTIN) 100 mg capsule   Yes No   Sig: Take 100 mg by mouth 4 (four) times a day   glucose blood test strip   Yes No   Sig: Use 1 each daily as needed Use as instructed   guaiFENesin (MUCINEX) 600 mg 12 hr tablet   No No   Sig: Take 1 tablet (600 mg total) by mouth every 12 (twelve) hours   levothyroxine 50 mcg tablet   Yes No   Sig: Take 50 mcg by mouth daily   lidocaine (LIDODERM) 5 %   No No   Sig: Apply 2 patches topically daily Remove & Discard patch within 12 hours or as directed by MD   memantine (NAMENDA) 10 mg tablet   Yes No   Sig: Take 10 mg by mouth 2 (two) times a day   methocarbamol (ROBAXIN) 500 mg tablet   No No   Sig: Take 0 5 tablets (250 mg total) by mouth every 6 (six) hours as needed for muscle spasms   metoprolol tartrate (LOPRESSOR) 25 mg tablet   Yes No   Sig: Take 25 mg by mouth daily   ondansetron (ZOFRAN) 4 mg tablet   Yes No   Sig: Take 4 mg by mouth every 8 (eight) hours as needed for nausea or vomiting   oxyCODONE (ROXICODONE) 5 immediate release tablet   No No   Sig: You may take 2 5mg (0 5 tab) for moderate pain or 5mg (1 tab) for severe pain, every 4 hours, as needed     pantoprazole (PROTONIX) 40 mg tablet   Yes No   Sig: Take 40 mg by mouth daily   polyethylene glycol (MIRALAX) 17 g packet   Yes No   Sig: Take 17 g by mouth daily   predniSONE 10 mg tablet   Yes No   Patient not taking: Reported on 11/16/2022   promethazine (PHENERGAN) 12 5 mg/10 mL syrup   Yes No   Sig: Take by mouth 4 (four) times a day as needed for nausea or vomiting   sodium chloride 1 g tablet   No No   Sig: Take 1 tablet (1 g total) by mouth in the morning   tiZANidine (ZANAFLEX) 2 mg tablet   Yes No   Sig: Take 2 mg by mouth every 8 (eight) hours as needed for muscle spasms   torsemide (DEMADEX) 20 mg tablet   No No   Sig: Take 1 tablet (20 mg total) by mouth daily      Facility-Administered Medications: None       Past Medical History:   Diagnosis Date   • CHF (congestive heart failure) (HCC)    • Dementia without behavioral disturbance (HCC)    • High cholesterol    • Hypertension    • Hypothyroid    • Stage 3 chronic kidney disease, unspecified whether stage 3a or 3b CKD ([facility] )    • Stroke ([facility] [address] ) 09/21/2021       Past Surgical History:   Procedure Laterality Date   • APPENDECTOMY     • CHOLECYSTECTOMY     • NEPHRECTOMY Left    • TOTAL ABDOMINAL HYSTERECTOMY W/ BILATERAL SALPINGOOPHORECTOMY         Family History   Problem Relation Age of Onset   • Diabetes Mother    • Cancer Sister      I have reviewed and agree with the history as documented      E-Cigarette/Vaping   • E-Cigarette Use Never User      E-Cigarette/Vaping Substances   • Nicotine No    • THC No    • CBD No    • Flavoring No    • Other No    • Unknown No      Social History     Tobacco Use   • Smoking status: Never   • Smokeless tobacco: Never   Vaping Use   • Vaping Use: Never used   Substance Use Topics   • Alcohol use: Not Currently   • Drug use: Never       Review of Systems    Physical Exam  Physical Exam    Vital Signs  ED Triage Vitals   Temperature Pulse Respirations Blood Pressure SpO2   05/13/23 1224 05/13/23 1219 05/13/23 1219 05/13/23 1219 05/13/23 1219   98 °F (36 7 °C) 87 18 135/63 (!) 84 %      Temp Source Heart Rate Source Patient Position - Orthostatic VS BP Location FiO2 (%)   05/13/23 1224 05/13/23 1219 05/13/23 1219 05/13/23 1219 --   Oral Monitor Lying Right arm       Pain Score       05/13/23 1219       No Pain           Vitals:    05/13/23 1219 05/13/23 1430 05/13/23 1530   BP: 135/63 160/70 131/72   Pulse: 87 85 91   Patient Position - Orthostatic VS: Lying Lying Lying         Visual Acuity      ED Medications  Medications   lactated ringers bolus 250 mL (250 mL Intravenous New Bag 5/13/23 1444)   iohexol (OMNIPAQUE) 300 mg/mL injection 50 mL (50 mL Intravenous Given 5/13/23 1507)       Diagnostic Studies  Results Reviewed     Procedure Component Value Units Date/Time    HS Troponin I 2hr [[id number]]  (Normal) Collected: 05/13/23 1448    Lab Status: Final result Specimen: Blood from Arm, Left Updated: 05/13/23 1520     hs TnI 2hr 11 ng/L      Delta 2hr hsTnI 1 ng/L     HS Troponin I 4hr [[id number]]     Lab Status: No result Specimen: Blood     D-dimer, quantitative [[id number]]  (Abnormal) Collected: 05/13/23 1327    Lab Status: Final result Specimen: Blood from Arm, Left Updated: 05/13/23 1424     D-Dimer, Quant 1 77 ug/ml FEU     Narrative: In the evaluation for possible pulmonary embolism, in the appropriate (Well's Score of 4 or less) patient, the age adjusted d-dimer cutoff for this patient can be calculated as:    Age x 0 01 (in ug/mL) for Age-adjusted D-dimer exclusion threshold for a patient over 50 years      Blood gas, venous [[id number]]  (Abnormal) Collected: 05/13/23 1327    Lab Status: Final result Specimen: Blood from Arm, Left Updated: 05/13/23 1336     pH, Moose 7 412     pCO2, Moose 53 2 mm Hg      pO2, Moose 65 8 mm Hg      HCO3, Moose 33 1 mmol/L      Base Excess, Moose 7 1 mmol/L      O2 Content, Moose 15 4 ml/dL      O2 HGB, VENOUS 88 6 %     UA (URINE) with reflex to Scope [[id number]]     Lab Status: No result Specimen: Urine     HS Troponin 0hr (reflex protocol) [[id number]]  (Normal) Collected: 05/13/23 1227    Lab Status: Final result Specimen: Blood from Arm, Right Updated: 05/13/23 1306     hs TnI 0hr 10 ng/L     Comprehensive metabolic panel [[id number]]  (Abnormal) Collected: 05/13/23 1227    Lab Status: Final result Specimen: Blood from Arm, Right Updated: 05/13/23 1256     Sodium 135 mmol/L      Potassium 3 8 mmol/L      Chloride 94 mmol/L      CO2 35 mmol/L      ANION GAP 6 mmol/L      BUN 9 mg/dL      Creatinine 0 61 mg/dL      Glucose 118 mg/dL      Calcium 9 2 mg/dL      AST 27 U/L      ALT 16 U/L      Alkaline Phosphatase 94 U/L      Total Protein 7 5 g/dL      Albumin 3 8 g/dL      Total Bilirubin 0 60 mg/dL      eGFR 80 ml/min/1 73sq m     Narrative:      Nando guidelines for Chronic Kidney Disease (CKD):   •  Stage 1 with normal or high GFR (GFR > 90 mL/min/1 73 square meters)  •  Stage 2 Mild CKD (GFR = 60-89 mL/min/1 73 square meters)  •  Stage 3A Moderate CKD (GFR = 45-59 mL/min/1 73 square meters)  •  Stage 3B Moderate CKD (GFR = 30-44 mL/min/1 73 square meters)  •  Stage 4 Severe CKD (GFR = 15-29 mL/min/1 73 square meters)  •  Stage 5 End Stage CKD (GFR <15 mL/min/1 73 square meters)  Note: GFR calculation is accurate only with a steady state creatinine    CBC and differential [[id number]]  (Abnormal) Collected: 05/13/23 1227    Lab Status: Final result Specimen: Blood from Arm, Right Updated: 05/13/23 1234     WBC 8 65 Thousand/uL      RBC 4 12 Million/uL      Hemoglobin 11 5 g/dL      Hematocrit 37 3 %      MCV 91 fL      MCH 27 9 pg      MCHC 30 8 g/dL      RDW 13 6 %      MPV 8 9 fL      Platelets 447 Thousands/uL      nRBC 0 /100 WBCs      Neutrophils Relative 73 %      Immat GRANS % 1 %      Lymphocytes Relative 16 %      Monocytes Relative 7 %      Eosinophils Relative 2 %      Basophils Relative 1 %      Neutrophils Absolute 6 42 Thousands/µL      Immature Grans Absolute 0 04 Thousand/uL      Lymphocytes Absolute 1 37 Thousands/µL      Monocytes Absolute 0 57 Thousand/µL      Eosinophils Absolute 0 21 Thousand/µL      Basophils Absolute 0 04 Thousands/µL                  CTA ED chest PE study   Final Result by Yaima Anderson MD (05/13 1542)      No CT evidence of pulmonary embolism  Extensive cystic changes and fibrotic changes of the lungs with bronchiectasis  Superimposed groundglass densities bilaterally may reflect mild pulmonary edema versus atypical infection or inflammation  Small bilateral pleural effusions  Borderline prominent mediastinal lymphadenopathy  More acute appearing moderate compression fracture deformity of T11, not seen on the prior study  Additional findings as above                    Workstation performed: [id number]         XR chest 1 view portable    (Results Pending)              Procedures  Procedures         ED Course  ED Course as of 05/13/23 1630   Sat May 13, 2023   1415 Er md note- initial er workup was first nursed    26 Er md note- all labs reviewed by er md   0 Er md  medical decision making note- pt is low risk for pe by wells score- score of  0- pt fails perc by age and baseline pulse ox- wlll check  d dimer and use peg ed study d dimer cutoff of 1000 for low risk pts- er md clinical suspicion of vte is low   1422 Cxr portable- compared to previous 7/22- no sign changes- bilateral right > left interstitial dx-- no change in mediastinum/cardiac silhouette- no free/sq air- no infiltrate- ptx   1626 - er md note- reviewed ct scan results with pt and son- pt has been resting in [place] in [place] with no comps--  currently on  nc 4 liters with o2 sat  in mid 80's-- [facility]                                              MDM    Disposition  Final diagnoses:   Bronchiectasis ([address] )     Time reflects when diagnosis was documented in both MDM as applicable and the Disposition within this note     Time User Action Codes Description Comment    5/13/2023  4:29 PM Raisa Pierre Add [J47 9] Bronchiectasis [facility])       ED Disposition     ED Disposition   Discharge    Condition   Stable    Date/Time   Sat May 13, 2023  4:29 PM    Comment   Ian Mai discharge to home/self care  Follow-up Information    None         Patient's Medications   Discharge Prescriptions    No medications on file       No discharge procedures on file      PDMP Review     None          ED Provider  Electronically Signed by Million/uL      Hemoglobin 11 5 g/dL      Hematocrit 37 3 %      MCV 91 fL      MCH 27 9 pg      MCHC 30 8 g/dL      RDW 13 6 %      MPV 8 9 fL      Platelets 282 Thousands/uL      nRBC 0 /100 WBCs      Neutrophils Relative 73 %      Immat GRANS % 1 %      Lymphocytes Relative 16 %      Monocytes Relative 7 %      Eosinophils Relative 2 %      Basophils Relative 1 %      Neutrophils Absolute 6 42 Thousands/µL      Immature Grans Absolute 0 04 Thousand/uL      Lymphocytes Absolute 1 37 Thousands/µL      Monocytes Absolute 0 57 Thousand/µL      Eosinophils Absolute 0 21 Thousand/µL      Basophils Absolute 0 04 Thousands/µL                  CTA ED chest PE study   Final Result by Maritza Hicks MD (05/13 1542)      No CT evidence of pulmonary embolism  Extensive cystic changes and fibrotic changes of the lungs with bronchiectasis  Superimposed groundglass densities bilaterally may reflect mild pulmonary edema versus atypical infection or inflammation  Small bilateral pleural effusions  Borderline prominent mediastinal lymphadenopathy  More acute appearing moderate compression fracture deformity of T11, not seen on the prior study  Additional findings as above                    Workstation performed: [id number]         XR chest 1 view portable    (Results Pending)              Procedures  Procedures         ED Course  ED Course as of 05/13/23 1630   Sat May 13, 2023   1415 Er md note- initial er workup was first nursed    26 Er md note- all labs reviewed by er md   0 Er md  medical decision making note- pt is low risk for pe by wells score- score of  0- pt fails perc by age and baseline pulse ox- wlll check  d dimer and use peg ed study d dimer cutoff of 1000 for low risk pts- er md clinical suspicion of vte is low   1422 Cxr portable- compared to previous 7/22- no sign changes- bilateral right > left interstitial dx-- no change in mediastinum/cardiac silhouette- no free/sq air- no infiltrate- ptx   5 - er md note- reviewed ct scan results with pt and son- pt has been resting in [place] in [place] with no comps--  currently on  nc 4 liters with o2 sat  in mid 80's-- son and pt [facility]                                              Medical Decision Making  Pt with acute sob  now stabilized -- poss acs/ vte /pn/ pleural effusions - pt will need labs/cxr /ecg  Andr e-eval     Bronchiectasis [facility]): chronic illness or injury that poses a threat to life or bodily functions     Details: see above   Amount and/or Complexity of Data Reviewed  Independent Historian:      Details: son  External Data Reviewed: labs, radiology, ECG and notes  Details: all previous studies reviewed by er md   Labs: ordered  Decision-making details documented in ED Course  Details: all reviewed   Radiology: ordered  Details: all reviewed   ECG/medicine tests: ordered and independent interpretation performed  Details: all reiewed   Discussion of management or test interpretation with external provider(s): Moderate amount of er md thought complexity and workup     Risk  Prescription drug management  Decision regarding hospitalization  Disposition  Final diagnoses:   Bronchiectasis ([address] )     Time reflects when diagnosis was documented in both MDM as applicable and the Disposition within this note     Time User Action Codes Description Comment    5/13/2023  4:29 PM Thurston Escort Add [J47 9] Bronchiectasis [facility])       ED Disposition     ED Disposition   Discharge    Condition   Stable    Date/Time   Sat May 13, 2023  4:29 PM    Comment   Robby Hymanover discharge to home/self care  Follow-up Information    None         Patient's Medications   Discharge Prescriptions    No medications on file       No discharge procedures on file      PDMP Review     None          ED Provider  Electronically Signed by           Sammie Page MD  05/22/23 8254

## 2023-05-13 NOTE — ED NOTES
Round trip set up for transport back to [facility]        Jaimie Navarrete RN  05/13/23 8193

## 2023-05-13 NOTE — ED PROCEDURE NOTE
PROCEDURE  ECG 12 Lead Documentation Only    Date/Time: 5/13/2023 2:16 PM  Performed by: Mendel Wilson MD  Authorized by: Mendel Wilson MD     Indications / Diagnosis:  Sob-  ECG reviewed by me, the ED Provider: yes    Patient location:  ED  Previous ECG:     Previous ECG:  Unavailable  Interpretation:     Interpretation: non-specific    Rate:     ECG rate:  87    ECG rate assessment: normal    Rhythm:     Rhythm: sinus rhythm    Ectopy:     Ectopy: none    QRS:     QRS axis:  Normal    QRS intervals:  Normal  Conduction:     Conduction: normal    ST segments:     ST segments:  Normal  T waves:     T waves: flattening      Flattening:  AVL, III and V1  Q waves:     Q waves:  III and V1  Other findings:     Other findings: poor R wave progression    Comments:      No ecg signs of ischemia/ injury          Mendel Wilson MD  05/13/23 0372

## 2023-05-14 LAB
ATRIAL RATE: 86 BPM
P AXIS: 49 DEGREES
PR INTERVAL: 164 MS
QRS AXIS: 30 DEGREES
QRSD INTERVAL: 82 MS
QT INTERVAL: 384 MS
QTC INTERVAL: 459 MS
T WAVE AXIS: 41 DEGREES
VENTRICULAR RATE: 86 BPM

## 2023-05-15 LAB
ATRIAL RATE: 87 BPM
P AXIS: 46 DEGREES
PR INTERVAL: 174 MS
QRS AXIS: 39 DEGREES
QRSD INTERVAL: 80 MS
QT INTERVAL: 382 MS
QTC INTERVAL: 459 MS
T WAVE AXIS: 47 DEGREES
VENTRICULAR RATE: 87 BPM

## 2023-05-19 ENCOUNTER — APPOINTMENT (EMERGENCY)
Dept: RADIOLOGY | Facility: HOSPITAL | Age: 88
End: 2023-05-19

## 2023-05-19 ENCOUNTER — HOSPITAL ENCOUNTER (INPATIENT)
Facility: HOSPITAL | Age: 88
LOS: 4 days | Discharge: NURSING, REHAB OR LONG-TERM CARE | End: 2023-05-23
Attending: EMERGENCY MEDICINE | Admitting: INTERNAL MEDICINE

## 2023-05-19 DIAGNOSIS — R06.00 DYSPNEA: Primary | ICD-10-CM

## 2023-05-19 DIAGNOSIS — I50.9 CHRONIC CONGESTIVE HEART FAILURE, UNSPECIFIED HEART FAILURE TYPE (HCC): Chronic | ICD-10-CM

## 2023-05-19 DIAGNOSIS — R09.02 HYPOXIA: ICD-10-CM

## 2023-05-19 DIAGNOSIS — I50.9 CHF EXACERBATION (HCC): ICD-10-CM

## 2023-05-19 DIAGNOSIS — J84.10 PULMONARY FIBROSIS (HCC): ICD-10-CM

## 2023-05-19 DIAGNOSIS — J98.4 CYSTIC-BULLOUS DISEASE OF LUNG: ICD-10-CM

## 2023-05-19 PROBLEM — R10.9 ABDOMINAL PAIN: Status: ACTIVE | Noted: 2023-05-19

## 2023-05-19 PROBLEM — J96.21 ACUTE ON CHRONIC RESPIRATORY FAILURE WITH HYPOXIA (HCC): Status: ACTIVE | Noted: 2023-05-19

## 2023-05-19 PROBLEM — J96.01 ACUTE RESPIRATORY FAILURE WITH HYPOXIA (HCC): Status: ACTIVE | Noted: 2023-05-19

## 2023-05-19 PROBLEM — I50.33 ACUTE ON CHRONIC DIASTOLIC CONGESTIVE HEART FAILURE (HCC): Status: ACTIVE | Noted: 2023-02-10

## 2023-05-19 LAB
2HR DELTA HS TROPONIN: 2 NG/L
ALBUMIN SERPL BCP-MCNC: 3.6 G/DL (ref 3.5–5)
ALP SERPL-CCNC: 83 U/L (ref 34–104)
ALT SERPL W P-5'-P-CCNC: 10 U/L (ref 7–52)
ANION GAP SERPL CALCULATED.3IONS-SCNC: 3 MMOL/L (ref 4–13)
AST SERPL W P-5'-P-CCNC: 21 U/L (ref 13–39)
BACTERIA UR QL AUTO: NORMAL /HPF
BASOPHILS # BLD AUTO: 0.07 THOUSANDS/ÂΜL (ref 0–0.1)
BASOPHILS NFR BLD AUTO: 1 % (ref 0–1)
BILIRUB SERPL-MCNC: 0.33 MG/DL (ref 0.2–1)
BILIRUB UR QL STRIP: NEGATIVE
BNP SERPL-MCNC: 184 PG/ML (ref 0–100)
BUN SERPL-MCNC: 13 MG/DL (ref 5–25)
CALCIUM SERPL-MCNC: 8.9 MG/DL (ref 8.4–10.2)
CARDIAC TROPONIN I PNL SERPL HS: 11 NG/L
CARDIAC TROPONIN I PNL SERPL HS: 9 NG/L
CHLORIDE SERPL-SCNC: 95 MMOL/L (ref 96–108)
CLARITY UR: CLEAR
CO2 SERPL-SCNC: 37 MMOL/L (ref 21–32)
COLOR UR: COLORLESS
CREAT SERPL-MCNC: 0.87 MG/DL (ref 0.6–1.3)
EOSINOPHIL # BLD AUTO: 0.28 THOUSAND/ÂΜL (ref 0–0.61)
EOSINOPHIL NFR BLD AUTO: 3 % (ref 0–6)
ERYTHROCYTE [DISTWIDTH] IN BLOOD BY AUTOMATED COUNT: 13.5 % (ref 11.6–15.1)
GFR SERPL CREATININE-BSD FRML MDRD: 59 ML/MIN/1.73SQ M
GLUCOSE SERPL-MCNC: 142 MG/DL (ref 65–140)
GLUCOSE UR STRIP-MCNC: NEGATIVE MG/DL
HCT VFR BLD AUTO: 35.2 % (ref 34.8–46.1)
HGB BLD-MCNC: 10.7 G/DL (ref 11.5–15.4)
HGB UR QL STRIP.AUTO: NEGATIVE
IMM GRANULOCYTES # BLD AUTO: 0.03 THOUSAND/UL (ref 0–0.2)
IMM GRANULOCYTES NFR BLD AUTO: 0 % (ref 0–2)
KETONES UR STRIP-MCNC: NEGATIVE MG/DL
LEUKOCYTE ESTERASE UR QL STRIP: NEGATIVE
LYMPHOCYTES # BLD AUTO: 2.07 THOUSANDS/ÂΜL (ref 0.6–4.47)
LYMPHOCYTES NFR BLD AUTO: 24 % (ref 14–44)
MCH RBC QN AUTO: 28.1 PG (ref 26.8–34.3)
MCHC RBC AUTO-ENTMCNC: 30.4 G/DL (ref 31.4–37.4)
MCV RBC AUTO: 92 FL (ref 82–98)
MONOCYTES # BLD AUTO: 0.66 THOUSAND/ÂΜL (ref 0.17–1.22)
MONOCYTES NFR BLD AUTO: 8 % (ref 4–12)
NEUTROPHILS # BLD AUTO: 5.71 THOUSANDS/ÂΜL (ref 1.85–7.62)
NEUTS SEG NFR BLD AUTO: 64 % (ref 43–75)
NITRITE UR QL STRIP: POSITIVE
NON-SQ EPI CELLS URNS QL MICRO: NORMAL /HPF
NRBC BLD AUTO-RTO: 0 /100 WBCS
PH UR STRIP.AUTO: 6.5 [PH]
PLATELET # BLD AUTO: 343 THOUSANDS/UL (ref 149–390)
PMV BLD AUTO: 8.9 FL (ref 8.9–12.7)
POTASSIUM SERPL-SCNC: 3.8 MMOL/L (ref 3.5–5.3)
PROCALCITONIN SERPL-MCNC: <0.05 NG/ML
PROT SERPL-MCNC: 7.2 G/DL (ref 6.4–8.4)
PROT UR STRIP-MCNC: NEGATIVE MG/DL
RBC # BLD AUTO: 3.81 MILLION/UL (ref 3.81–5.12)
RBC #/AREA URNS AUTO: NORMAL /HPF
SODIUM SERPL-SCNC: 135 MMOL/L (ref 135–147)
SP GR UR STRIP.AUTO: 1.01 (ref 1–1.03)
UROBILINOGEN UR STRIP-ACNC: <2 MG/DL
WBC # BLD AUTO: 8.82 THOUSAND/UL (ref 4.31–10.16)
WBC #/AREA URNS AUTO: NORMAL /HPF

## 2023-05-19 RX ORDER — DILTIAZEM HYDROCHLORIDE 120 MG/1
120 CAPSULE, COATED, EXTENDED RELEASE ORAL DAILY
Status: DISCONTINUED | OUTPATIENT
Start: 2023-05-20 | End: 2023-05-23 | Stop reason: HOSPADM

## 2023-05-19 RX ORDER — DOCUSATE SODIUM 100 MG/1
100 CAPSULE, LIQUID FILLED ORAL 2 TIMES DAILY
Status: DISCONTINUED | OUTPATIENT
Start: 2023-05-19 | End: 2023-05-23 | Stop reason: HOSPADM

## 2023-05-19 RX ORDER — CLOPIDOGREL BISULFATE 75 MG/1
75 TABLET ORAL DAILY
Status: DISCONTINUED | OUTPATIENT
Start: 2023-05-20 | End: 2023-05-23 | Stop reason: HOSPADM

## 2023-05-19 RX ORDER — LEVOTHYROXINE SODIUM 0.05 MG/1
50 TABLET ORAL
Status: DISCONTINUED | OUTPATIENT
Start: 2023-05-20 | End: 2023-05-23 | Stop reason: HOSPADM

## 2023-05-19 RX ORDER — FUROSEMIDE 10 MG/ML
40 INJECTION INTRAMUSCULAR; INTRAVENOUS ONCE
Status: COMPLETED | OUTPATIENT
Start: 2023-05-19 | End: 2023-05-19

## 2023-05-19 RX ORDER — CYANOCOBALAMIN 1000 UG/ML
1000 INJECTION, SOLUTION INTRAMUSCULAR; SUBCUTANEOUS
Status: DISCONTINUED | OUTPATIENT
Start: 2023-05-19 | End: 2023-05-19 | Stop reason: ALTCHOICE

## 2023-05-19 RX ORDER — POLYETHYLENE GLYCOL 3350 17 G/17G
17 POWDER, FOR SOLUTION ORAL DAILY PRN
Status: DISCONTINUED | OUTPATIENT
Start: 2023-05-19 | End: 2023-05-23

## 2023-05-19 RX ORDER — GUAIFENESIN 600 MG/1
600 TABLET, EXTENDED RELEASE ORAL EVERY 12 HOURS SCHEDULED
Status: DISCONTINUED | OUTPATIENT
Start: 2023-05-19 | End: 2023-05-23 | Stop reason: HOSPADM

## 2023-05-19 RX ORDER — FUROSEMIDE 10 MG/ML
40 INJECTION INTRAMUSCULAR; INTRAVENOUS DAILY
Status: DISCONTINUED | OUTPATIENT
Start: 2023-05-20 | End: 2023-05-22

## 2023-05-19 RX ORDER — METOPROLOL TARTRATE 5 MG/5ML
5 INJECTION INTRAVENOUS EVERY 6 HOURS PRN
Status: DISCONTINUED | OUTPATIENT
Start: 2023-05-19 | End: 2023-05-23 | Stop reason: HOSPADM

## 2023-05-19 RX ORDER — HEPARIN SODIUM 5000 [USP'U]/ML
5000 INJECTION, SOLUTION INTRAVENOUS; SUBCUTANEOUS EVERY 8 HOURS SCHEDULED
Status: DISCONTINUED | OUTPATIENT
Start: 2023-05-19 | End: 2023-05-23 | Stop reason: HOSPADM

## 2023-05-19 RX ORDER — MEMANTINE HYDROCHLORIDE 10 MG/1
10 TABLET ORAL 2 TIMES DAILY
Status: DISCONTINUED | OUTPATIENT
Start: 2023-05-19 | End: 2023-05-23 | Stop reason: HOSPADM

## 2023-05-19 RX ORDER — ATORVASTATIN CALCIUM 20 MG/1
20 TABLET, FILM COATED ORAL DAILY
Status: DISCONTINUED | OUTPATIENT
Start: 2023-05-20 | End: 2023-05-23 | Stop reason: HOSPADM

## 2023-05-19 RX ORDER — GABAPENTIN 100 MG/1
100 CAPSULE ORAL 4 TIMES DAILY
Status: DISCONTINUED | OUTPATIENT
Start: 2023-05-19 | End: 2023-05-23 | Stop reason: HOSPADM

## 2023-05-19 RX ORDER — FAMOTIDINE 20 MG/1
20 TABLET, FILM COATED ORAL
Status: DISCONTINUED | OUTPATIENT
Start: 2023-05-19 | End: 2023-05-23 | Stop reason: HOSPADM

## 2023-05-19 RX ORDER — ACETAMINOPHEN 325 MG/1
650 TABLET ORAL EVERY 4 HOURS PRN
Status: DISCONTINUED | OUTPATIENT
Start: 2023-05-19 | End: 2023-05-23 | Stop reason: HOSPADM

## 2023-05-19 RX ADMIN — MEMANTINE 10 MG: 10 TABLET ORAL at 21:18

## 2023-05-19 RX ADMIN — DOCUSATE SODIUM 100 MG: 100 CAPSULE, LIQUID FILLED ORAL at 21:20

## 2023-05-19 RX ADMIN — GUAIFENESIN 600 MG: 600 TABLET ORAL at 21:18

## 2023-05-19 RX ADMIN — FAMOTIDINE 20 MG: 20 TABLET ORAL at 21:18

## 2023-05-19 RX ADMIN — HEPARIN SODIUM 5000 UNITS: 5000 INJECTION INTRAVENOUS; SUBCUTANEOUS at 21:18

## 2023-05-19 RX ADMIN — FUROSEMIDE 40 MG: 10 INJECTION, SOLUTION INTRAMUSCULAR; INTRAVENOUS at 17:25

## 2023-05-19 RX ADMIN — GABAPENTIN 100 MG: 100 CAPSULE ORAL at 21:18

## 2023-05-19 NOTE — ASSESSMENT & PLAN NOTE
· Has a history of pulmonary fibrosis, is seen on the recent CT scan and x-rays as well  · Patient has chronic respiratory failure because of this and takes around 2 L oxygen at home  · Denies having a pulmonologist  · Pulmonary consult ordered

## 2023-05-19 NOTE — H&P
[facility]  H&P  Name: Fidel Ty 80 y o  female I MRN: [id number]  Unit/Bed#: S -01 I Date of Admission: 5/19/2023   Date of Service: 5/19/2023 I Hospital Day: 0      Assessment/Plan   * Acute on chronic respiratory failure with hypoxia [facility])  Assessment & Plan  · Patient has a history of chronic respiratory failure and is on 2 Ls at home per her  · Started having worsening shortness of breath for the last several days, especially on admission day so she was sent to the ED  · Patient required 4 Ls in the ED  · POA x-ray pulmonary fibrosis and some possible pulmonary edema  · POA labs: , anion gap 3, hemoglobin 10 7, otherwise unremarkable  · Likely patient has slight fluid overload in the setting of low pulmonary reserve because of pulmonary fibrosis  · Says that she does not have a pulmonologist, and also does not have a PCP currently since she moved to this area  · Received IV Lasix 40 mg in the ED       Plan:  · We will hold off on antibiotics, because of no leukocytosis, fevers or signs of infection for now  · Check procalcitonin  · Continue IV Lasix 40 mg daily  · I/O  · Daily weights  · Check echo  · Continue monitoring fluid status    Acute on chronic diastolic congestive heart failure (HCC)  Assessment & Plan  Wt Readings from Last 3 Encounters:   05/19/23 75 2 kg (165 lb 12 6 oz)   05/13/23 78 8 kg (173 lb 11 6 oz)   02/07/23 77 6 kg (171 lb)     · Patient has a history of diastolic CHF  · No Echos available in the chart  · POA , normal for age  · Patient has increased weight with 2 kg [around 4 pounds] in the last 7 days  · In the ED received IV Lasix 40 mg  · At home takes torsemide 20 mg daily    Plan:  · Continue IV Lasix 40 mg for now  · Daily weights  · Check echo  · I/O    Abdominal pain  Assessment & Plan  · Patient has suprapubic abdominal pain  · Bladder scan showed that patient is retaining urine  · UA ordered  · Urinary retention protocol ordered    Pulmonary fibrosis ([facility] )  Assessment & Plan  · Has a history of pulmonary fibrosis, is seen on the recent CT scan and x-rays as well  · Patient has chronic respiratory failure because of this and takes around 2 L oxygen at home  · Denies having a pulmonologist  · Pulmonary consult ordered    SIADH (syndrome of inappropriate ADH production) ([facility] )  Assessment & Plan  · Has a history of SIADH  · Has been following up with nephrology  · Takes salt tablets 1 g/day  · We will currently hold salt tablets due to CHF exacerbation  · Continue monitoring Sodium levels    Hypothyroid  Assessment & Plan  · Continue levothyroxine 50 micrograms    HTN (hypertension)  Assessment & Plan  · Patient takes Cardizem, metoprolol, torsemide  · Currently holding torsemide  · Continue metoprolol and diltiazem       VTE Pharmacologic Prophylaxis: VTE Score: 5 High Risk (Score >/= 5) - Pharmacological DVT Prophylaxis Ordered: heparin  Sequential Compression Devices Ordered  Code Status: Level 1 - Full Code   Discussion with family: Updated  (son) via phone  Anticipated Length of Stay: Patient will be admitted on an inpatient basis with an anticipated length of stay of greater than 2 midnights secondary to CHF exacerbation, acute on chronic respiratory failure  Chief Complaint: Shortness of breath    History of Present Illness:  Therese Bruce is a 80 y o  female with a PMH of Pulmonary fibrosis, chronic respiratory failure on 2 L nasal cannula, SIADH, who presents with shortness of breath  She is unsure which medications she is taking  And lives in an assisted living facility  In the ED Xray showed fibrosis unchanged from before but possible pulmonary edema  She received IV Lasix 40 mg in the ED  On physical exam seems slightly volume overloaded, but given low reserve of the lungs, she likely couldn't tolerate slight fluid overload   She is getting admitted to the [facility] service, for diuresis, Echo, pulmonology consult and further workup and management  Review of Systems:  Review of Systems   Constitutional: Negative for chills and fever  HENT: Negative for ear pain and sore throat  Eyes: Negative for pain and visual disturbance  Respiratory: Positive for shortness of breath  Negative for cough, chest tightness and wheezing  Cardiovascular: Negative for chest pain and palpitations  Gastrointestinal: Positive for abdominal pain  Negative for vomiting  Genitourinary: Negative for dysuria and hematuria  Musculoskeletal: Negative for arthralgias and back pain  Skin: Negative for color change and rash  Neurological: Negative for seizures and syncope  All other systems reviewed and are negative  Past Medical and Surgical History:   Past Medical History:   Diagnosis Date   • CHF (congestive heart failure) ([place] [address] )    • Dementia without behavioral disturbance (HCC)    • High cholesterol    • Hypertension    • Hypothyroid    • Stage 3 chronic kidney disease, unspecified whether stage 3a or 3b CKD ([place] [address] )    • Stroke ([place] [address] ) 09/21/2021       Past Surgical History:   Procedure Laterality Date   • APPENDECTOMY     • CHOLECYSTECTOMY     • NEPHRECTOMY Left    • TOTAL ABDOMINAL HYSTERECTOMY W/ BILATERAL SALPINGOOPHORECTOMY         Meds/Allergies:  Prior to Admission medications    Medication Sig Start Date End Date Taking?  Authorizing Provider   acetaminophen (TYLENOL) 325 mg tablet Take 2 tablets (650 mg total) by mouth every 6 (six) hours as needed for mild pain  Patient taking differently: Take 650 mg by mouth every 8 (eight) hours as needed for mild pain 7/14/22   TANI Becker   atorvastatin (LIPITOR) 20 mg tablet Take 20 mg by mouth daily    Historical Provider, MD   clopidogrel (PLAVIX) 75 mg tablet Take 75 mg by mouth daily    Historical Provider, MD   cyanocobalamin 1,000 mcg/mL Inject 1,000 mcg into a muscle every 30 (thirty) days    Historical Provider, MD   Diclofenac Sodium (VOLTAREN) 1 % Apply 2 g topically 4 (four) times a day    Historical Provider, MD   diltiazem (DILACOR XR) 120 MG 24 hr capsule Take 120 mg by mouth daily    Historical Provider, MD   docusate sodium (COLACE) 100 mg capsule Take 1 capsule (100 mg total) by mouth 2 (two) times a day 7/30/22   TANI Smith   ergocalciferol (VITAMIN D2) 50,000 units Take 50,000 Units by mouth once a week    Historical Provider, MD   famotidine (PEPCID) 40 MG tablet Take 40 mg by mouth daily    Historical Provider, MD   gabapentin (NEURONTIN) 100 mg capsule Take 100 mg by mouth 4 (four) times a day 9/12/22   Historical Provider, MD   glucose blood test strip Use 1 each daily as needed Use as instructed    Historical Provider, MD   guaiFENesin (MUCINEX) 600 mg 12 hr tablet Take 1 tablet (600 mg total) by mouth every 12 (twelve) hours 7/30/22   TANI Smith   levothyroxine 50 mcg tablet Take 50 mcg by mouth daily    Historical Provider, MD   lidocaine (LIDODERM) 5 % Apply 2 patches topically daily Remove & Discard patch within 12 hours or as directed by MD 7/31/22   TANI Smith   memantine [facility]) 10 mg tablet Take 10 mg by mouth 2 (two) times a day    Historical Provider, MD   methocarbamol (ROBAXIN) 500 mg tablet Take 0 5 tablets (250 mg total) by mouth every 6 (six) hours as needed for muscle spasms 7/30/22   TANI Smith   metoprolol tartrate (LOPRESSOR) 25 mg tablet Take 25 mg by mouth daily 9/25/22   Historical Provider, MD   Multiple Vitamins-Minerals ([employer]) TABS Take by mouth    Historical Provider, MD   NON FORMULARY Super papaya enzyme  Take 2 tablets by mouth as needed for upset stomach    Historical Provider, MD   ondansetron (ZOFRAN) 4 mg tablet Take 4 mg by mouth every 8 (eight) hours as needed for nausea or vomiting    Historical Provider, MD   oxyCODONE (ROXICODONE) 5 immediate release tablet You may take 2 5mg (0 5 tab) for moderate pain or 5mg (1 tab) for severe pain, every 4 hours, as needed   7/30/22 "  TANI Moore   polyethylene glycol (MIRALAX) 17 g packet Take 17 g by mouth daily    Historical Provider, MD   promethazine (PHENERGAN) 12 5 mg/10 mL syrup Take by mouth 4 (four) times a day as needed for nausea or vomiting    Historical Provider, MD   sodium chloride 1 g tablet Take 1 tablet (1 g total) by mouth in the morning 2/20/23   Mira Topete MD   tiZANidine (ZANAFLEX) 2 mg tablet Take 2 mg by mouth every 8 (eight) hours as needed for muscle spasms    Historical Provider, MD   torsemide (DEMADEX) 20 mg tablet Take 1 tablet (20 mg total) by mouth daily 7/31/22   TANI Moore   diltiazem (CARDIZEM) 120 MG tablet Take 120 mg by mouth every 6 (six) hours  Patient not taking: Reported on 2/7/2023 5/19/23  Historical Provider, MD   pantoprazole (PROTONIX) 40 mg tablet Take 40 mg by mouth daily  5/19/23  Historical Provider, MD   predniSONE 10 mg tablet  10/24/22 5/19/23  Historical Provider, MD     I have reviewed home medications using recent Epic encounter  Allergies: Allergies   Allergen Reactions   • 5ht3 Receptor Antagonists Hives   • Codeine Hives   • Penicillins Hives       Social History:  Marital Status:     Occupation: Retired  Patient Pre-hospital Living Situation: Assisted Living  Patient Pre-hospital Level of Mobility: walks  Patient Pre-hospital Diet Restrictions: None  Substance Use History:   Social History     Substance and Sexual Activity   Alcohol Use Not Currently     Social History     Tobacco Use   Smoking Status Never   Smokeless Tobacco Never     Social History     Substance and Sexual Activity   Drug Use Never       Family History:  Family History   Problem Relation Age of Onset   • Diabetes Mother    • Cancer Sister        Physical Exam:     Vitals:   Blood Pressure: 144/86 (05/19/23 1836)  Pulse: 84 (05/19/23 1836)  Temperature: 98 4 °F (36 9 °C) (05/19/23 1836)  Temp Source: Oral (05/19/23 1836)  Respirations: 17 (05/19/23 1836)  Height: 5' 2\" " (157 5 cm) (05/19/23 1836)  Weight - Scale: 75 2 kg (165 lb 12 6 oz) (05/19/23 1836)  SpO2: 95 % (05/19/23 1836)    Physical Exam  Vitals and nursing note reviewed  Constitutional:       General: She is not in acute distress  Appearance: She is well-developed  HENT:      Head: Normocephalic and atraumatic  Eyes:      Conjunctiva/sclera: Conjunctivae normal    Cardiovascular:      Rate and Rhythm: Normal rate and regular rhythm  Heart sounds: No murmur heard  Pulmonary:      Effort: Pulmonary effort is normal  No respiratory distress  Breath sounds: Normal breath sounds  Abdominal:      Palpations: Abdomen is soft  Tenderness: There is abdominal tenderness (Suprapubic)  Musculoskeletal:         General: No swelling  Cervical back: Neck supple  Right lower leg: Edema (Trace) present  Left lower leg: Edema (Trace) present  Skin:     General: Skin is warm and dry  Capillary Refill: Capillary refill takes less than 2 seconds  Neurological:      Mental Status: She is alert     Psychiatric:         Mood and Affect: Mood normal           Additional Data:     Lab Results:  Results from last 7 days   Lab Units 05/19/23  1537   WBC Thousand/uL 8 82   HEMOGLOBIN g/dL 10 7*   HEMATOCRIT % 35 2   PLATELETS Thousands/uL 343   NEUTROS PCT % 64   LYMPHS PCT % 24   MONOS PCT % 8   EOS PCT % 3     Results from last 7 days   Lab Units 05/19/23  1537   SODIUM mmol/L 135   POTASSIUM mmol/L 3 8   CHLORIDE mmol/L 95*   CO2 mmol/L 37*   BUN mg/dL 13   CREATININE mg/dL 0 87   ANION GAP mmol/L 3*   CALCIUM mg/dL 8 9   ALBUMIN g/dL 3 6   TOTAL BILIRUBIN mg/dL 0 33   ALK PHOS U/L 83   ALT U/L 10   AST U/L 21   GLUCOSE RANDOM mg/dL 142*                       Lines/Drains:  Invasive Devices     Peripheral Intravenous Line  Duration           Peripheral IV 05/19/23 Left Antecubital <1 day                    Imaging: Reviewed radiology reports from this admission including: chest xray  XR chest 2 views   ED Interpretation by Lisbeth Sung DO (05/19 8609)   Extensive fibrotic changes unchanged from previous chest x-ray  Possible pulmonary edema  EKG and Other Studies Reviewed on Admission:   · EKG: NSR  HR 91     ** Please Note: This note has been constructed using a voice recognition system   **

## 2023-05-19 NOTE — ASSESSMENT & PLAN NOTE
· Patient has suprapubic abdominal pain  · Bladder scan showed that patient is retaining urine  · UA ordered  · Urinary retention protocol ordered

## 2023-05-19 NOTE — ED PROVIDER NOTES
"History  Chief Complaint   Patient presents with   • Shortness of Breath     Per EMS pt was sitting and suddenly felt SOB  Staff reports o2 \"was in 70's\" Pt states to use oxygen PRN at baseline  Pt denies cp, further symptoms     Patient is an 75-year-old female with a history of CHF, CKD, hypertension and dementia who presents with shortness of breath  Patient was noted to have acute onset of shortness of breath earlier today and her oxygen saturation was in the 70s on room air  Patient has home oxygen but states that she does not always wear it  She was evaluated in the emergency department earlier this week and was discharged home to titrate her nasal cannula oxygen  Patient states that her shortness of breath is better  She denies chest pain, cough, fever, chills or other complaints  History somewhat limited by dementia  History provided by:  Patient  History limited by:  Dementia  Shortness of Breath  Severity:  Moderate  Duration:  1 day  Timing:  Constant  Chronicity:  New  Relieved by:  Oxygen  Associated symptoms: no abdominal pain, no chest pain, no cough, no diaphoresis, no fever, no headaches, no neck pain, no rash, no sore throat and no vomiting        Prior to Admission Medications   Prescriptions Last Dose Informant Patient Reported? Taking?    Diclofenac Sodium (VOLTAREN) 1 %   Yes No   Sig: Apply 2 g topically 4 (four) times a day   Multiple Vitamins-Minerals ([employer]) TABS   Yes No   Sig: Take by mouth   NON FORMULARY   Yes No   Sig: Super papaya enzyme  Take 2 tablets by mouth as needed for upset stomach   acetaminophen (TYLENOL) 325 mg tablet   No No   Sig: Take 2 tablets (650 mg total) by mouth every 6 (six) hours as needed for mild pain   Patient taking differently: Take 650 mg by mouth every 8 (eight) hours as needed for mild pain   atorvastatin (LIPITOR) 20 mg tablet   Yes No   Sig: Take 20 mg by mouth daily   clopidogrel (PLAVIX) 75 mg tablet   Yes No   Sig: Take 75 mg by " mouth daily   cyanocobalamin 1,000 mcg/mL   Yes No   Sig: Inject 1,000 mcg into a muscle every 30 (thirty) days   diltiazem (DILACOR XR) 120 MG 24 hr capsule   Yes No   Sig: Take 120 mg by mouth daily   docusate sodium (COLACE) 100 mg capsule   No No   Sig: Take 1 capsule (100 mg total) by mouth 2 (two) times a day   ergocalciferol (VITAMIN D2) 50,000 units   Yes No   Sig: Take 50,000 Units by mouth once a week   famotidine (PEPCID) 40 MG tablet   Yes No   Sig: Take 40 mg by mouth daily   gabapentin (NEURONTIN) 100 mg capsule   Yes No   Sig: Take 100 mg by mouth 4 (four) times a day   glucose blood test strip   Yes No   Sig: Use 1 each daily as needed Use as instructed   guaiFENesin (MUCINEX) 600 mg 12 hr tablet   No No   Sig: Take 1 tablet (600 mg total) by mouth every 12 (twelve) hours   levothyroxine 50 mcg tablet   Yes No   Sig: Take 50 mcg by mouth daily   lidocaine (LIDODERM) 5 %   No No   Sig: Apply 2 patches topically daily Remove & Discard patch within 12 hours or as directed by MD   memantine (NAMENDA) 10 mg tablet   Yes No   Sig: Take 10 mg by mouth 2 (two) times a day   methocarbamol (ROBAXIN) 500 mg tablet   No No   Sig: Take 0 5 tablets (250 mg total) by mouth every 6 (six) hours as needed for muscle spasms   metoprolol tartrate (LOPRESSOR) 25 mg tablet   Yes No   Sig: Take 25 mg by mouth daily   ondansetron (ZOFRAN) 4 mg tablet   Yes No   Sig: Take 4 mg by mouth every 8 (eight) hours as needed for nausea or vomiting   oxyCODONE (ROXICODONE) 5 immediate release tablet   No No   Sig: You may take 2 5mg (0 5 tab) for moderate pain or 5mg (1 tab) for severe pain, every 4 hours, as needed     polyethylene glycol (MIRALAX) 17 g packet   Yes No   Sig: Take 17 g by mouth daily   promethazine (PHENERGAN) 12 5 mg/10 mL syrup   Yes No   Sig: Take by mouth 4 (four) times a day as needed for nausea or vomiting   sodium chloride 1 g tablet   No No   Sig: Take 1 tablet (1 g total) by mouth in the morning   tiZANidine (ZANAFLEX) 2 mg tablet   Yes No   Sig: Take 2 mg by mouth every 8 (eight) hours as needed for muscle spasms   torsemide (DEMADEX) 20 mg tablet   No No   Sig: Take 1 tablet (20 mg total) by mouth daily      Facility-Administered Medications: None       Past Medical History:   Diagnosis Date   • CHF (congestive heart failure) ([facility])    • Dementia without behavioral disturbance (HCC)    • High cholesterol    • Hypertension    • Hypothyroid    • Stage 3 chronic kidney disease, unspecified whether stage 3a or 3b CKD ([facility] )    • Stroke ([facility] ) 09/21/2021       Past Surgical History:   Procedure Laterality Date   • APPENDECTOMY     • CHOLECYSTECTOMY     • NEPHRECTOMY Left    • TOTAL ABDOMINAL HYSTERECTOMY W/ BILATERAL SALPINGOOPHORECTOMY         Family History   Problem Relation Age of Onset   • Diabetes Mother    • Cancer Sister      I have reviewed and agree with the history as documented  E-Cigarette/Vaping   • E-Cigarette Use Never User      E-Cigarette/Vaping Substances   • Nicotine No    • THC No    • CBD No    • Flavoring No    • Other No    • Unknown No      Social History     Tobacco Use   • Smoking status: Never   • Smokeless tobacco: Never   Vaping Use   • Vaping Use: Never used   Substance Use Topics   • Alcohol use: Not Currently   • Drug use: Never       Review of Systems   Constitutional: Negative for chills, diaphoresis and fever  HENT: Negative for nosebleeds, sore throat and trouble swallowing  Eyes: Negative for photophobia, pain and visual disturbance  Respiratory: Positive for shortness of breath  Negative for cough and chest tightness  Cardiovascular: Negative for chest pain, palpitations and leg swelling  Gastrointestinal: Negative for abdominal pain, constipation, diarrhea, nausea and vomiting  Endocrine: Negative for polydipsia and polyuria  Genitourinary: Negative for difficulty urinating, dysuria, hematuria, pelvic pain, vaginal bleeding and vaginal discharge     Musculoskeletal: Negative for back pain, neck pain and neck stiffness  Skin: Negative for pallor and rash  Neurological: Negative for dizziness, seizures, light-headedness and headaches  All other systems reviewed and are negative  Physical Exam  Physical Exam  Vitals and nursing note reviewed  Constitutional:       General: She is not in acute distress  Appearance: She is well-developed  HENT:      Head: Normocephalic and atraumatic  Eyes:      Pupils: Pupils are equal, round, and reactive to light  Cardiovascular:      Rate and Rhythm: Normal rate and regular rhythm  Pulses: Normal pulses  Heart sounds: Normal heart sounds  Pulmonary:      Effort: Pulmonary effort is normal  No respiratory distress  Breath sounds: Rales (throughout all lung fields) present  Abdominal:      General: There is no distension  Palpations: Abdomen is soft  Abdomen is not rigid  Tenderness: There is no abdominal tenderness  There is no guarding or rebound  Musculoskeletal:         General: No tenderness  Normal range of motion  Cervical back: Normal range of motion and neck supple  Right lower leg: Edema present  Left lower leg: Edema present  Lymphadenopathy:      Cervical: No cervical adenopathy  Skin:     General: Skin is warm and dry  Capillary Refill: Capillary refill takes less than 2 seconds  Neurological:      Mental Status: She is alert and oriented to person, place, and time  Cranial Nerves: No cranial nerve deficit  Sensory: No sensory deficit           Vital Signs  ED Triage Vitals   Temperature Pulse Respirations Blood Pressure SpO2   05/19/23 1507 05/19/23 1507 05/19/23 1507 05/19/23 1507 05/19/23 1507   98 4 °F (36 9 °C) 95 20 146/65 (S) (!) 79 %      Temp Source Heart Rate Source Patient Position - Orthostatic VS BP Location FiO2 (%)   05/19/23 1507 05/19/23 1507 05/19/23 1507 05/19/23 1507 --   Oral Monitor Sitting Right arm       Pain Score 05/19/23 1827       No Pain           Vitals:    05/20/23 1555 05/20/23 2118 05/21/23 0013 05/21/23 0732   BP: 157/69 149/65 129/69 142/71   Pulse: 72  85    Patient Position - Orthostatic VS:             Visual Acuity  Visual Acuity    Flowsheet Row Most Recent Value   L Pupil Size (mm) 3   R Pupil Size (mm) 3   L Pupil Shape Round   R Pupil Shape Round          ED Medications  Medications   atorvastatin (LIPITOR) tablet 20 mg (20 mg Oral Given 5/20/23 0801)   clopidogrel (PLAVIX) tablet 75 mg (75 mg Oral Given 5/20/23 0801)   diltiazem (CARDIZEM CD) 24 hr capsule 120 mg (120 mg Oral Given 5/20/23 0801)   docusate sodium (COLACE) capsule 100 mg (100 mg Oral Not Given 5/20/23 1717)   famotidine (PEPCID) tablet 20 mg (20 mg Oral Given 5/20/23 2124)   gabapentin (NEURONTIN) capsule 100 mg (100 mg Oral Given 5/20/23 2124)   guaiFENesin (MUCINEX) 12 hr tablet 600 mg (600 mg Oral Given 5/20/23 2124)   levothyroxine tablet 50 mcg (50 mcg Oral Given 5/21/23 0540)   memantine (NAMENDA) tablet 10 mg (10 mg Oral Given 5/20/23 1717)   metoprolol tartrate (LOPRESSOR) tablet 25 mg (25 mg Oral Given 5/20/23 0801)   polyethylene glycol (MIRALAX) packet 17 g (has no administration in time range)   furosemide (LASIX) injection 40 mg (40 mg Intravenous Given 5/20/23 0801)   heparin (porcine) subcutaneous injection 5,000 Units (5,000 Units Subcutaneous Given 5/21/23 0540)   acetaminophen (TYLENOL) tablet 650 mg (has no administration in time range)   metoprolol (LOPRESSOR) injection 5 mg (has no administration in time range)   furosemide (LASIX) injection 40 mg (40 mg Intravenous Given 5/19/23 1725)       Diagnostic Studies  Results Reviewed     Procedure Component Value Units Date/Time    Ferritin [[id number]]  (Normal) Collected: 05/19/23 1537    Lab Status: Final result Specimen: Blood from Arm, Left Updated: 05/20/23 0429     Ferritin 35 ng/mL     Iron Saturation % [[id number]]  (Abnormal) Collected: 05/19/23 1537    Lab Status: Final result Specimen: Blood from Arm, Left Updated: 05/20/23 0253     Iron Saturation 8 %      TIBC 369 ug/dL      Iron 28 ug/dL     Procalcitonin [[id number]]  (Normal) Collected: 05/19/23 1537    Lab Status: Final result Specimen: Blood from Arm, Left Updated: 05/19/23 2008     Procalcitonin <0 05 ng/ml     HS Troponin I 2hr [[id number]]  (Normal) Collected: 05/19/23 1808    Lab Status: Final result Specimen: Blood from Arm, Left Updated: 05/19/23 1840     hs TnI 2hr 11 ng/L      Delta 2hr hsTnI 2 ng/L     B-Type Natriuretic Peptide(BNP) [[id number]]  (Abnormal) Collected: 05/19/23 1537    Lab Status: Final result Specimen: Blood from Arm, Left Updated: 05/19/23 1656      pg/mL     HS Troponin 0hr (reflex protocol) [[id number]]  (Normal) Collected: 05/19/23 1537    Lab Status: Final result Specimen: Blood from Arm, Left Updated: 05/19/23 1605     hs TnI 0hr 9 ng/L     Comprehensive metabolic panel [[id number]]  (Abnormal) Collected: 05/19/23 1537    Lab Status: Final result Specimen: Blood from Arm, Left Updated: 05/19/23 1558     Sodium 135 mmol/L      Potassium 3 8 mmol/L      Chloride 95 mmol/L      CO2 37 mmol/L      ANION GAP 3 mmol/L      BUN 13 mg/dL      Creatinine 0 87 mg/dL      Glucose 142 mg/dL      Calcium 8 9 mg/dL      AST 21 U/L      ALT 10 U/L      Alkaline Phosphatase 83 U/L      Total Protein 7 2 g/dL      Albumin 3 6 g/dL      Total Bilirubin 0 33 mg/dL      eGFR 59 ml/min/1 73sq m     Narrative:      [place] guidelines for Chronic Kidney Disease (CKD):   •  Stage 1 with normal or high GFR (GFR > 90 mL/min/1 73 square meters)  •  Stage 2 Mild CKD (GFR = 60-89 mL/min/1 73 square meters)  •  Stage 3A Moderate CKD (GFR = 45-59 mL/min/1 73 square meters)  •  Stage 3B Moderate CKD (GFR = 30-44 mL/min/1 73 square meters)  •  Stage 4 Severe CKD (GFR = 15-29 mL/min/1 73 square meters)  •  Stage 5 End Stage CKD (GFR <15 mL/min/1 73 square meters)  Note: GFR calculation is accurate only with a steady state creatinine    CBC and differential [[id number]]  (Abnormal) Collected: 05/19/23 1537    Lab Status: Final result Specimen: Blood from Arm, Left Updated: 05/19/23 1546     WBC 8 82 Thousand/uL      RBC 3 81 Million/uL      Hemoglobin 10 7 g/dL      Hematocrit 35 2 %      MCV 92 fL      MCH 28 1 pg      MCHC 30 4 g/dL      RDW 13 5 %      MPV 8 9 fL      Platelets 359 Thousands/uL      nRBC 0 /100 WBCs      Neutrophils Relative 64 %      Immat GRANS % 0 %      Lymphocytes Relative 24 %      Monocytes Relative 8 %      Eosinophils Relative 3 %      Basophils Relative 1 %      Neutrophils Absolute 5 71 Thousands/µL      Immature Grans Absolute 0 03 Thousand/uL      Lymphocytes Absolute 2 07 Thousands/µL      Monocytes Absolute 0 66 Thousand/µL      Eosinophils Absolute 0 28 Thousand/µL      Basophils Absolute 0 07 Thousands/µL                  XR chest 2 views   ED Interpretation by Sasha Rucker DO (05/19 2471)   Extensive fibrotic changes unchanged from previous chest x-ray  Possible pulmonary edema  Final Result by Pedro Benoit MD (05/20 8303)      No acute cardiopulmonary disease  Extensive pulmonary fibrosis  Workstation performed: [id number]                    Procedures  ECG 12 Lead Documentation Only    Date/Time: 5/19/2023 3:27 PM  Performed by: Sasha Rucker DO  Authorized by: Sasha Rucker DO     ECG reviewed by me, the ED Provider: yes    Patient location:  ED  Previous ECG:     Previous ECG:  Compared to current    Similarity:  No change    Comparison to cardiac monitor: Yes    Comments:      Normal sinus rhythm at a rate of 91 bpm   Normal intervals  Normal axis  Normal QRS  No ST-T wave abnormalities  Similar to previous from 5/13/2023      CriticalCare Time    Date/Time: 5/19/2023 4:00 PM  Performed by: Sasha Rucker DO  Authorized by: Sasha Rucker DO     Critical care provider statement:     Critical care time (minutes):  30    Critical care time was exclusive of:  Separately billable procedures and treating other patients    Critical care was necessary to treat or prevent imminent or life-threatening deterioration of the following conditions:  Respiratory failure    Critical care was time spent personally by me on the following activities:  Blood draw for specimens, obtaining history from patient or surrogate, development of treatment plan with patient or surrogate, discussions with consultants, evaluation of patient's response to treatment, examination of patient, interpretation of cardiac output measurements, ordering and performing treatments and interventions, ordering and review of laboratory studies, ordering and review of radiographic studies, re-evaluation of patient's condition and review of old charts  Comments:      Patient required supplemental oxygen for hypoxia and IV lasix for CHF exacerbation  ED Course  ED Course as of 05/21/23 0921   Fri May 19, 2023   1528 Patient was seen in the emergency department on 5/13/2023 for similar symptoms and similar oxygen saturation  She had a CTA chest which showed no pulmonary embolism  Concern for pulmonary edema versus atypical infection  Patient was discharged back to her living facility on 4 L nasal cannula oxygen   1708 Attempted to call son on mobile phone, however there is no answer  SBIRT 20yo+    Flowsheet Row Most Recent Value   Initial Alcohol Screen: US AUDIT-C     1  How often do you have a drink containing alcohol? 0 Filed at: 05/19/2023 1509   2  How many drinks containing alcohol do you have on a typical day you are drinking? 0 Filed at: 05/19/2023 1509   3a  Male UNDER 65: How often do you have five or more drinks on one occasion? 0 Filed at: 05/19/2023 1509   3b  FEMALE Any Age, or MALE 65+: How often do you have 4 or more drinks on one occassion?  0 Filed at: 05/19/2023 1509   Audit-C Score 0 Filed at: "05/19/2023 1509   SUKUMAR: How many times in the past year have you    Used an illegal drug or used a prescription medication for non-medical reasons? Never Filed at: 05/19/2023 1504                    Medical Decision Making  Patient presents with hypoxia  Patient has a history of pulmonary fibrosis and supplemental oxygen to wear as needed  However she was hypoxic at her living facility and complaining of shortness of breath  Today, she displays evidence of fluid overload with peripheral edema and JVD  Possible superimposed pulmonary edema on chest x-ray  We will treat for acute on chronic CHF exacerbation  Patient is requiring supplemental oxygen and was given IV Lasix  Current weight 80 6 kg    Wt Readings from Last 3 Encounters:  05/13/23 : 78 8 kg (173 lb 11 6 oz)  02/07/23 : 77 6 kg (171 lb)  11/16/22 : 75 8 kg (167 lb)    Portions of the above record have been created with voice recognition software   Occasional wrong word or \"sound alike\" substitutions may have occurred due to the inherent limitations of voice recognition software   Read the chart carefully and recognize, using context, where substitutions may have occurred  CHF exacerbation [facility]):     Details: Patient with a history of chronic CHF  She has evidence of fluid overload on today's exam   Chest x-ray shows pulmonary fibrosis with possible superimposed pulmonary edema  We will treat with IV Lasix  Will hospitalize for further treatment  Dyspnea:     Details: May be multifactorial, secondary to pulmonary fibrosis and acute CHF exacerbation  We will treat with diuretics and supplemental oxygen  Hypoxia:     Details: Patient requiring increased supplemental oxygen from baseline  Pulmonary fibrosis [facility]):     Details: This is a chronic condition  Although it may be contributing to her current symptoms, it is more likely to be secondary to CHF exacerbation  Amount and/or Complexity of Data Reviewed  Labs: ordered    Radiology: " ordered and independent interpretation performed  ECG/medicine tests: ordered and independent interpretation performed  Risk  Prescription drug management  Decision regarding hospitalization  Disposition  Final diagnoses:   Dyspnea   CHF exacerbation ([facility] )   Hypoxia   Pulmonary fibrosis ([facility] )     Time reflects when diagnosis was documented in both MDM as applicable and the Disposition within this note     Time User Action Codes Description Comment    5/19/2023  5:48 PM Loyde Cagey Add [R06 00] Dyspnea     5/19/2023  5:48 PM Alis Ahr L Add [I50 9] CH[facility] exacerbation ([facility] )     5/19/2023  5:54 PM Loyde Cagey Add [R09 02] Hypoxia     5/19/2023  5:54 PM Loyde Cagey Add [Y51 83] Pulmonary fibrosis [facility])       ED Disposition     ED Disposition   Admit    Condition   Stable    Date/Time   Fri May 19, 2023  5:47 PM    Comment   Case was discussed with ALICJA and the patient's admission status was agreed to be Admission Status: inpatient status to the service of Dr Richie Miller             Follow-up Information    None         Current Discharge Medication List      CONTINUE these medications which have NOT CHANGED    Details   acetaminophen (TYLENOL) 325 mg tablet Take 2 tablets (650 mg total) by mouth every 6 (six) hours as needed for mild pain  Qty: 30 tablet, Refills: 0    Associated Diagnoses: Fall, initial encounter      atorvastatin (LIPITOR) 20 mg tablet Take 20 mg by mouth daily      clopidogrel (PLAVIX) 75 mg tablet Take 75 mg by mouth daily      cyanocobalamin 1,000 mcg/mL Inject 1,000 mcg into a muscle every 30 (thirty) days      Diclofenac Sodium (VOLTAREN) 1 % Apply 2 g topically 4 (four) times a day      diltiazem (DILACOR XR) 120 MG 24 hr capsule Take 120 mg by mouth daily      docusate sodium (COLACE) 100 mg capsule Take 1 capsule (100 mg total) by mouth 2 (two) times a day  Refills: 0    Associated Diagnoses: Rib fracture      ergocalciferol (VITAMIN D2) 50,000 units Take 50,000 Units by mouth once a week      famotidine (PEPCID) 40 MG tablet Take 40 mg by mouth daily      gabapentin (NEURONTIN) 100 mg capsule Take 100 mg by mouth 4 (four) times a day      glucose blood test strip Use 1 each daily as needed Use as instructed      guaiFENesin (MUCINEX) 600 mg 12 hr tablet Take 1 tablet (600 mg total) by mouth every 12 (twelve) hours  Refills: 0    Associated Diagnoses: Rib fracture      levothyroxine 50 mcg tablet Take 50 mcg by mouth daily      lidocaine (LIDODERM) 5 % Apply 2 patches topically daily Remove & Discard patch within 12 hours or as directed by MD  Refills: 0    Associated Diagnoses: Rib fracture      memantine (NAMENDA) 10 mg tablet Take 10 mg by mouth 2 (two) times a day      methocarbamol (ROBAXIN) 500 mg tablet Take 0 5 tablets (250 mg total) by mouth every 6 (six) hours as needed for muscle spasms  Refills: 0    Associated Diagnoses: Rib fracture      metoprolol tartrate (LOPRESSOR) 25 mg tablet Take 25 mg by mouth daily      Multiple Vitamins-Minerals ([employer]) TABS Take by mouth      NON FORMULARY Super papaya enzyme  Take 2 tablets by mouth as needed for upset stomach      ondansetron (ZOFRAN) 4 mg tablet Take 4 mg by mouth every 8 (eight) hours as needed for nausea or vomiting      oxyCODONE (ROXICODONE) 5 immediate release tablet You may take 2 5mg (0 5 tab) for moderate pain or 5mg (1 tab) for severe pain, every 4 hours, as needed    Qty: 20 tablet, Refills: 0    Associated Diagnoses: Rib fracture      polyethylene glycol (MIRALAX) 17 g packet Take 17 g by mouth daily      promethazine (PHENERGAN) 12 5 mg/10 mL syrup Take by mouth 4 (four) times a day as needed for nausea or vomiting      sodium chloride 1 g tablet Take 1 tablet (1 g total) by mouth in the morning  Qty: 120 tablet, Refills: 2    Associated Diagnoses: Hyponatremia      tiZANidine (ZANAFLEX) 2 mg tablet Take 2 mg by mouth every 8 (eight) hours as needed for muscle spasms      torsemide (DEMADEX) 20 mg tablet Take 1 tablet (20 mg total) by mouth daily  Refills: 0    Associated Diagnoses: Chronic congestive heart failure, unspecified heart failure type ([facility] )             No discharge procedures on file      PDMP Review     None          ED Provider  Electronically Signed by           Erasmo Paula DO  05/21/23 8623

## 2023-05-19 NOTE — ASSESSMENT & PLAN NOTE
· Patient has a history of chronic respiratory failure and is on 2 Ls at home per her  · Started having worsening shortness of breath for the last several days, especially on admission day so she was sent to the ED  · Patient required 4 Ls in the ED  · POA x-ray pulmonary fibrosis and some possible pulmonary edema  · POA labs: , anion gap 3, hemoglobin 10 7, otherwise unremarkable  · Likely patient has slight fluid overload in the setting of low pulmonary reserve because of pulmonary fibrosis  · Says that she does not have a pulmonologist, and also does not have a PCP currently since she moved to this area  · Received IV Lasix 40 mg in the ED       Plan:  · We will hold off on antibiotics, because of no leukocytosis, fevers or signs of infection for now  · Check procalcitonin  · Continue IV Lasix 40 mg daily  · I/O  · Daily weights  · Check echo  · Continue monitoring fluid status

## 2023-05-19 NOTE — ASSESSMENT & PLAN NOTE
Wt Readings from Last 3 Encounters:   05/19/23 75 2 kg (165 lb 12 6 oz)   05/13/23 78 8 kg (173 lb 11 6 oz)   02/07/23 77 6 kg (171 lb)     · Patient has a history of diastolic CHF  · No Echos available in the chart  · POA , normal for age  · Patient has increased weight with 2 kg [around 4 pounds] in the last 7 days  · In the ED received IV Lasix 40 mg  · At home takes torsemide 20 mg daily    Plan:  · Continue IV Lasix 40 mg for now  · Daily weights  · Check echo  · I/O

## 2023-05-19 NOTE — ASSESSMENT & PLAN NOTE
· Has a history of SIADH  · Has been following up with nephrology  · Takes salt tablets 1 g/day  · We will currently hold salt tablets due to CHF exacerbation  · Continue monitoring Sodium levels

## 2023-05-19 NOTE — ASSESSMENT & PLAN NOTE
· Patient takes Cardizem, metoprolol, torsemide  · Currently holding torsemide  · Continue metoprolol and diltiazem

## 2023-05-20 ENCOUNTER — APPOINTMENT (INPATIENT)
Dept: NON INVASIVE DIAGNOSTICS | Facility: HOSPITAL | Age: 88
End: 2023-05-20

## 2023-05-20 PROBLEM — R19.5 LOOSE STOOLS: Status: ACTIVE | Noted: 2023-05-20

## 2023-05-20 LAB
ANION GAP SERPL CALCULATED.3IONS-SCNC: 5 MMOL/L (ref 4–13)
AORTIC ROOT: 3.1 CM
APICAL FOUR CHAMBER EJECTION FRACTION: 69 %
ASCENDING AORTA: 3.3 CM
ATRIAL RATE: 91 BPM
BUN SERPL-MCNC: 9 MG/DL (ref 5–25)
CALCIUM SERPL-MCNC: 8.8 MG/DL (ref 8.4–10.2)
CHLORIDE SERPL-SCNC: 96 MMOL/L (ref 96–108)
CO2 SERPL-SCNC: 36 MMOL/L (ref 21–32)
CREAT SERPL-MCNC: 0.61 MG/DL (ref 0.6–1.3)
E WAVE DECELERATION TIME: 324 MS
ERYTHROCYTE [DISTWIDTH] IN BLOOD BY AUTOMATED COUNT: 13.5 % (ref 11.6–15.1)
FERRITIN SERPL-MCNC: 35 NG/ML (ref 11–307)
FRACTIONAL SHORTENING: 26 % (ref 28–44)
GFR SERPL CREATININE-BSD FRML MDRD: 80 ML/MIN/1.73SQ M
GLUCOSE SERPL-MCNC: 89 MG/DL (ref 65–140)
HCT VFR BLD AUTO: 33.2 % (ref 34.8–46.1)
HGB BLD-MCNC: 10.2 G/DL (ref 11.5–15.4)
INTERVENTRICULAR SEPTUM IN DIASTOLE (PARASTERNAL SHORT AXIS VIEW): 1.7 CM
INTERVENTRICULAR SEPTUM: 1.7 CM (ref 0.6–1.1)
IRON SATN MFR SERPL: 8 % (ref 15–50)
IRON SERPL-MCNC: 28 UG/DL (ref 50–170)
LAAS-AP2: 14.7 CM2
LAAS-AP4: 15.5 CM2
LEFT ATRIUM SIZE: 4 CM
LEFT INTERNAL DIMENSION IN SYSTOLE: 1.7 CM (ref 2.1–4)
LEFT VENTRICULAR INTERNAL DIMENSION IN DIASTOLE: 2.3 CM (ref 3.5–6)
LEFT VENTRICULAR POSTERIOR WALL IN END DIASTOLE: 1.3 CM
LEFT VENTRICULAR STROKE VOLUME: 10 ML
LVSV (TEICH): 10 ML
MCH RBC QN AUTO: 27.8 PG (ref 26.8–34.3)
MCHC RBC AUTO-ENTMCNC: 30.7 G/DL (ref 31.4–37.4)
MCV RBC AUTO: 91 FL (ref 82–98)
MV E'TISSUE VEL-SEP: 6 CM/S
MV MEAN GRADIENT: 7.5 MMHG
MV PEAK A VEL: 1.84 M/S
MV PEAK E VEL: 143 CM/S
MV STENOSIS PRESSURE HALF TIME: 94 MS
MV VALVE AREA P 1/2 METHOD: 2.34 CM2
P AXIS: 43 DEGREES
PLATELET # BLD AUTO: 320 THOUSANDS/UL (ref 149–390)
PMV BLD AUTO: 9 FL (ref 8.9–12.7)
POTASSIUM SERPL-SCNC: 3.9 MMOL/L (ref 3.5–5.3)
PR INTERVAL: 190 MS
QRS AXIS: 45 DEGREES
QRSD INTERVAL: 80 MS
QT INTERVAL: 360 MS
QTC INTERVAL: 442 MS
RBC # BLD AUTO: 3.67 MILLION/UL (ref 3.81–5.12)
RIGHT ATRIUM AREA SYSTOLE A4C: 14.5 CM2
RIGHT VENTRICLE ID DIMENSION: 3.5 CM
SL CV LEFT ATRIUM LENGTH A2C: 4.9 CM
SL CV LV EF: 75
SL CV PED ECHO LEFT VENTRICLE DIASTOLIC VOLUME (MOD BIPLANE) 2D: 19 ML
SL CV PED ECHO LEFT VENTRICLE SYSTOLIC VOLUME (MOD BIPLANE) 2D: 8 ML
SODIUM SERPL-SCNC: 137 MMOL/L (ref 135–147)
T WAVE AXIS: 64 DEGREES
TIBC SERPL-MCNC: 369 UG/DL (ref 250–450)
TR MAX PG: 37 MMHG
TR PEAK VELOCITY: 3.1 M/S
TRICUSPID ANNULAR PLANE SYSTOLIC EXCURSION: 1.7 CM
TRICUSPID VALVE PEAK REGURGITATION VELOCITY: 3.06 M/S
VENTRICULAR RATE: 91 BPM
WBC # BLD AUTO: 10.61 THOUSAND/UL (ref 4.31–10.16)

## 2023-05-20 RX ADMIN — ATORVASTATIN CALCIUM 20 MG: 20 TABLET, FILM COATED ORAL at 08:01

## 2023-05-20 RX ADMIN — HEPARIN SODIUM 5000 UNITS: 5000 INJECTION INTRAVENOUS; SUBCUTANEOUS at 21:24

## 2023-05-20 RX ADMIN — GABAPENTIN 100 MG: 100 CAPSULE ORAL at 17:17

## 2023-05-20 RX ADMIN — HEPARIN SODIUM 5000 UNITS: 5000 INJECTION INTRAVENOUS; SUBCUTANEOUS at 05:18

## 2023-05-20 RX ADMIN — GABAPENTIN 100 MG: 100 CAPSULE ORAL at 13:00

## 2023-05-20 RX ADMIN — GABAPENTIN 100 MG: 100 CAPSULE ORAL at 08:01

## 2023-05-20 RX ADMIN — DOCUSATE SODIUM 100 MG: 100 CAPSULE, LIQUID FILLED ORAL at 08:02

## 2023-05-20 RX ADMIN — MEMANTINE 10 MG: 10 TABLET ORAL at 17:17

## 2023-05-20 RX ADMIN — GUAIFENESIN 600 MG: 600 TABLET ORAL at 21:24

## 2023-05-20 RX ADMIN — GUAIFENESIN 600 MG: 600 TABLET ORAL at 08:01

## 2023-05-20 RX ADMIN — DILTIAZEM HYDROCHLORIDE 120 MG: 120 CAPSULE, COATED, EXTENDED RELEASE ORAL at 08:01

## 2023-05-20 RX ADMIN — CLOPIDOGREL BISULFATE 75 MG: 75 TABLET ORAL at 08:01

## 2023-05-20 RX ADMIN — MEMANTINE 10 MG: 10 TABLET ORAL at 08:01

## 2023-05-20 RX ADMIN — FAMOTIDINE 20 MG: 20 TABLET ORAL at 21:24

## 2023-05-20 RX ADMIN — GABAPENTIN 100 MG: 100 CAPSULE ORAL at 21:24

## 2023-05-20 RX ADMIN — HEPARIN SODIUM 5000 UNITS: 5000 INJECTION INTRAVENOUS; SUBCUTANEOUS at 13:00

## 2023-05-20 RX ADMIN — METOPROLOL TARTRATE 25 MG: 25 TABLET, FILM COATED ORAL at 08:01

## 2023-05-20 RX ADMIN — FUROSEMIDE 40 MG: 10 INJECTION, SOLUTION INTRAMUSCULAR; INTRAVENOUS at 08:01

## 2023-05-20 RX ADMIN — LEVOTHYROXINE SODIUM 50 MCG: 50 TABLET ORAL at 05:18

## 2023-05-20 NOTE — ASSESSMENT & PLAN NOTE
· Patient has a history of chronic respiratory failure and is on 2 Ls at home per her  · Started having worsening shortness of breath for the last several days, especially on admission day so she was sent to the ED  · Patient required 4 Ls in the ED  · POA x-ray pulmonary fibrosis and some possible pulmonary edema  · POA labs: , anion gap 3, hemoglobin 10 7, otherwise unremarkable  · Likely patient has slight fluid overload in the setting of low pulmonary reserve because of pulmonary fibrosis  · Says that she does not have a pulmonologist, and also does not have a PCP currently since she moved to this area  · Received IV Lasix 40 mg in the ED   ECHO 5/20/2023: There is severe asymmetric hypertrophy of the septal wall  There is mild hypertrophy of the remaining LV walls  The left ventricular ejection fraction is 75%  Systolic function is hyperdynamic  Wall motion is normal  There is an LVOT and mid-cavity dynamic obstruction at rest worsens with Valsalva, consistent with a hyperdynamic hypertrophied LV        Plan:  · We will hold off on antibiotics, because of no leukocytosis, fevers or signs of infection for now  · Continue IV Lasix 40 mg daily  · I/O  · Daily weights  · Continue monitoring fluid status

## 2023-05-20 NOTE — CONSULTS
"Consult Note - Pulmonary and [address] 80 y o  female MRN: [id number]  Unit/Bed#: S -01 Encounter: [id number]    Consult requested location: Unit/Bed#: S -01  Physician Requesting Consult: Gatito Harmon MD   Reason for Consult: \"Pulmonary fibrosis\"      HPI:    43-year-old female with no pulmonary medical history per her presents for evaluation of worsening hypoxic respiratory failure  Patient states that she normally wears 2 L of supplemental oxygen for an unknown reason  She denies pulmonary albums  She presents with worsening shortness of breath over the last several days  No fevers or chills  No respiratory infection symptoms  No sputum production  ROS:  Twelve point review of systems was negative except for what is otherwise mentioned        Past Medical Hx  Past Medical History:   Diagnosis Date   • CHF (congestive heart failure) (HCC)    • Dementia without behavioral disturbance (HCC)    • High cholesterol    • Hypertension    • Hypothyroid    • Stage 3 chronic kidney disease, unspecified whether stage 3a or 3b CKD (HCC)    • Stroke ([facility] [address] ) 09/21/2021           Past Surgical Hx  Past Surgical History:   Procedure Laterality Date   • APPENDECTOMY     • CHOLECYSTECTOMY     • NEPHRECTOMY Left    • TOTAL ABDOMINAL HYSTERECTOMY W/ BILATERAL SALPINGOOPHORECTOMY             Family Hx  Family History   Problem Relation Age of Onset   • Diabetes Mother    • Cancer Sister            Occupational History:   No known previous inhalation injuries       Medications    Current Facility-Administered Medications:   •  acetaminophen (TYLENOL) tablet 650 mg, 650 mg, Oral, Q4H PRN, Paris Frost MD  •  atorvastatin (LIPITOR) tablet 20 mg, 20 mg, Oral, Daily, Paris Frost MD, 20 mg at 05/20/23 0801  •  clopidogrel (PLAVIX) tablet 75 mg, 75 mg, Oral, Daily, Paris Frost MD, 75 mg at 05/20/23 0801  •  diltiazem (CARDIZEM CD) 24 hr capsule 120 mg, " "120 mg, Oral, Daily, Belal Kim MD, 120 mg at 05/20/23 0801  •  docusate sodium (COLACE) capsule 100 mg, 100 mg, Oral, BID, Bella Kim MD, 100 mg at 05/20/23 0802  •  famotidine (PEPCID) tablet 20 mg, 20 mg, Oral, HS, Bella Kim MD, 20 mg at 05/19/23 2118  •  furosemide (LASIX) injection 40 mg, 40 mg, Intravenous, Daily, Bella Kim MD, 40 mg at 05/20/23 0801  •  gabapentin (NEURONTIN) capsule 100 mg, 100 mg, Oral, 4x Daily, Bella Kim MD, 100 mg at 05/20/23 0801  •  guaiFENesin (MUCINEX) 12 hr tablet 600 mg, 600 mg, Oral, Q12H [address], Bella Kim MD, 600 mg at 05/20/23 0801  •  heparin (porcine) subcutaneous injection 5,000 Units, 5,000 Units, Subcutaneous, Q8H [address], 5,000 Units at 05/20/23 0518 **AND** Platelet count, , , Once, Bella Kim MD  •  levothyroxine tablet 50 mcg, 50 mcg, Oral, Early Morning, Bella Kim MD, 50 mcg at 05/20/23 0518  •  memantine (NAMENDA) tablet 10 mg, 10 mg, Oral, BID, Bella Kim MD, 10 mg at 05/20/23 0801  •  metoprolol (LOPRESSOR) injection 5 mg, 5 mg, Intravenous, Q6H PRN, Bella Kim MD  •  metoprolol tartrate (LOPRESSOR) tablet 25 mg, 25 mg, Oral, Daily, Bella Kim MD, 25 mg at 05/20/23 0801  •  polyethylene glycol (MIRALAX) packet 17 g, 17 g, Oral, Daily PRN, Bella Kim MD       Social History:   Lifelong non-smoker per her      Vitals: Blood pressure 143/68, pulse 89, temperature 98 9 °F (37 2 °C), resp  rate 16, height 5' 2\" (1 575 m), weight 78 1 kg (172 lb 2 9 oz), SpO2 91 % , Body mass index is 31 49 kg/m²      Physical Exam:  GEN  NAD  HEENT  ncat, non icteric, MM moist  NECK  supple, no JVD, no LAD  CV  +s1s2, no mrg, RRR  PULM diffuse but basilar predominant rales, no wheeze, no rhonchi  ABD  soft, nt, obese, + BS  EXT no cyanosis, no clubbing  NEURO  Aox3, no focal weakness    Imaging:  I personally viewed and interpreted the following " imaging studies:  CT chest 5/13/2023 shows diffuse heterogenous appearing cystic lung disease with diffuse groundglass opacification      Assessment:  1  Acute on chronic hypoxic respiratory failure  2  Acute volume overload/pulmonary edema  3  Cystic lung disease    Plan:  • Continue to titrate supplemental oxygen for goal SPO2 of greater than 88% in the setting of chronic hypoxic respiratory failure  • Etiology of cystic lung disease is unknown at this time  Appearance of cystic disease is most concerning for pulmonary Langerhans' cell histiocytosis, or less likely lymphangioleiomyomatosis, lymphocytic interstitial pneumonia or Khai Carolene Sayer  • I recommend adequate diuresis until patient is thought to be euvolemic  • Once euvolemic, I recommend patient follow-up with pulmonary medicine as outpatient  • I do not recommend any systemic corticosteroids at this time  I do not recommend bronchoscopy or surgical biopsy at this time  • Pulmonary medicine will continue to see on Monday, 5/22/2023  Please reach out for any specific questions or concerns prior to 5/22/2023  • Plan discussed with primary team      [facility]

## 2023-05-20 NOTE — ASSESSMENT & PLAN NOTE
· Patient has suprapubic abdominal pain  · Bladder scan showed that patient is retaining urine  · UA was unremarkable  · Urinary retention protocol ordered

## 2023-05-20 NOTE — ASSESSMENT & PLAN NOTE
Wt Readings from Last 3 Encounters:   05/20/23 78 1 kg (172 lb 2 9 oz)   05/13/23 78 8 kg (173 lb 11 6 oz)   02/07/23 77 6 kg (171 lb)     · Patient has a history of diastolic CHF  · No Echos available in the chart  · POA , normal for age  · Patient has increased weight with 2 kg [around 4 pounds] in the last 7 days  · In the ED received IV Lasix 40 mg  · Patient denies taking torsemide at home    Plan:  · Continue IV Lasix 40 mg for now  · Daily weights  · I/O  · Outpatient cardiology

## 2023-05-20 NOTE — PROGRESS NOTES
[facility]  Progress Note  Name: Marcelo Quintero  MRN: [id number]  Unit/Bed#: S -01 I Date of Admission: 5/19/2023   Date of Service: 5/20/2023 I Hospital Day: 1    Assessment/Plan   * Acute on chronic respiratory failure with hypoxia [facility])  Assessment & Plan  · Patient has a history of chronic respiratory failure and is on 2 Ls at home per her  · Started having worsening shortness of breath for the last several days, especially on admission day so she was sent to the ED  · Patient required 4 Ls in the ED  · POA x-ray pulmonary fibrosis and some possible pulmonary edema  · POA labs: , anion gap 3, hemoglobin 10 7, otherwise unremarkable  · Likely patient has slight fluid overload in the setting of low pulmonary reserve because of pulmonary fibrosis  · Says that she does not have a pulmonologist, and also does not have a PCP currently since she moved to this area  · Received IV Lasix 40 mg in the ED   ECHO 5/20/2023: There is severe asymmetric hypertrophy of the septal wall  There is mild hypertrophy of the remaining LV walls  The left ventricular ejection fraction is 75%  Systolic function is hyperdynamic  Wall motion is normal  There is an LVOT and mid-cavity dynamic obstruction at rest worsens with Valsalva, consistent with a hyperdynamic hypertrophied LV        Plan:  · We will hold off on antibiotics, because of no leukocytosis, fevers or signs of infection for now  · Continue IV Lasix 40 mg daily  · I/O  · Daily weights  · Continue monitoring fluid status    Loose stools  Assessment & Plan  The episodes of loose stools ordered a stool enteric panel    Abdominal pain  Assessment & Plan  · Patient has suprapubic abdominal pain  · Bladder scan showed that patient is retaining urine  · UA was unremarkable  · Urinary retention protocol ordered    Pulmonary fibrosis ([facility] [address] )  Assessment & Plan  · Has a history of pulmonary fibrosis, is seen on the recent CT scan and x-rays as well  · Patient has chronic respiratory failure because of this and takes around 2 L oxygen at home  · Denies having a pulmonologist  · Pulmonary consult ordered    SIADH (syndrome of inappropriate ADH production) ([facility] [address] )  Assessment & Plan  · Has a history of SIADH  · Has been following up with nephrology  · Takes salt tablets 1 g/day  · We will currently hold salt tablets due to CHF exacerbation  · Continue monitoring Sodium levels    Acute on chronic diastolic congestive heart failure (HCC)  Assessment & Plan  Wt Readings from Last 3 Encounters:   05/20/23 78 1 kg (172 lb 2 9 oz)   05/13/23 78 8 kg (173 lb 11 6 oz)   02/07/23 77 6 kg (171 lb)     · Patient has a history of diastolic CHF  · No Echos available in the chart  · POA , normal for age  · Patient has increased weight with 2 kg [around 4 pounds] in the last 7 days  · In the ED received IV Lasix 40 mg  · Patient denies taking torsemide at home    Plan:  · Continue IV Lasix 40 mg for now  · Daily weights  · I/O  · Outpatient cardiology      Hypothyroid  Assessment & Plan  · Continue levothyroxine 50 micrograms    HTN (hypertension)  Assessment & Plan  · Patient takes Cardizem, metoprolol, torsemide  · Continue metoprolol and diltiazem             VTE Pharmacologic Prophylaxis: VTE Score: 5 High Risk (Score >/= 5) - Pharmacological DVT Prophylaxis Ordered: heparin  Sequential Compression Devices Ordered  Patient Centered Rounds: I performed bedside rounds with nursing staff today  Discussions with Specialists or Other Care Team Provider: None    Education and Discussions with Family / Patient: Updated  (son) via phone  Current Length of Stay: 1 day(s)  Current Patient Status: Inpatient   Discharge Plan: Anticipate discharge in 24-48 hrs to home  Code Status: Level 1 - Full Code    Subjective:   Patient does not report and headaches, chest pain, abdominal pain, nausea vomiting  Also reports good sleep    Shortness of breath improved      Objective:     Vitals:   Temp (24hrs), Av 8 °F (37 1 °C), Min:98 4 °F (36 9 °C), Max:99 1 °F (37 3 °C)    Temp:  [98 4 °F (36 9 °C)-99 1 °F (37 3 °C)] 98 9 °F (37 2 °C)  HR:  [84-89] 89  Resp:  [16-17] 16  BP: (142-147)/(57-86) 143/68  SpO2:  [91 %-95 %] 91 %  Body mass index is 31 49 kg/m²  Input and Output Summary (last 24 hours): Intake/Output Summary (Last 24 hours) at 2023 1539  Last data filed at 2023 0900  Gross per 24 hour   Intake 230 ml   Output 3220 ml   Net -2990 ml       Physical Exam:   Physical Exam  Vitals and nursing note reviewed  Constitutional:       General: She is not in acute distress  Appearance: She is well-developed  HENT:      Head: Normocephalic and atraumatic  Nose: Nose normal       Mouth/Throat:      Mouth: Mucous membranes are moist    Eyes:      Conjunctiva/sclera: Conjunctivae normal    Cardiovascular:      Rate and Rhythm: Normal rate and regular rhythm  Heart sounds: No murmur heard  Pulmonary:      Effort: Pulmonary effort is normal  No respiratory distress  Breath sounds: Rales present  Abdominal:      Palpations: Abdomen is soft  Tenderness: There is no abdominal tenderness  Musculoskeletal:      Cervical back: Neck supple  Right lower leg: Edema present  Left lower leg: Edema present  Skin:     General: Skin is warm and dry  Capillary Refill: Capillary refill takes less than 2 seconds  Neurological:      General: No focal deficit present  Mental Status: She is alert and oriented to person, place, and time     Psychiatric:         Mood and Affect: Mood normal           Additional Data:     Labs:  Results from last 7 days   Lab Units 23  0436 23  1537   WBC Thousand/uL 10 61* 8 82   HEMOGLOBIN g/dL 10 2* 10 7*   HEMATOCRIT % 33 2* 35 2   PLATELETS Thousands/uL 320 343   NEUTROS PCT %  --  64   LYMPHS PCT %  --  24   MONOS PCT %  --  8   EOS PCT %  --  3     Results from last 7 days   Lab Units 05/20/23  0436 05/19/23  1537   SODIUM mmol/L 137 135   POTASSIUM mmol/L 3 9 3 8   CHLORIDE mmol/L 96 95*   CO2 mmol/L 36* 37*   BUN mg/dL 9 13   CREATININE mg/dL 0 61 0 87   ANION GAP mmol/L 5 3*   CALCIUM mg/dL 8 8 8 9   ALBUMIN g/dL  --  3 6   TOTAL BILIRUBIN mg/dL  --  0 33   ALK PHOS U/L  --  83   ALT U/L  --  10   AST U/L  --  21   GLUCOSE RANDOM mg/dL 89 142*                 Results from last 7 days   Lab Units 05/19/23  1537   PROCALCITONIN ng/ml <0 05       Lines/Drains:  Invasive Devices     Peripheral Intravenous Line  Duration           Peripheral IV 05/19/23 Left Antecubital 1 day                  Telemetry:  Telemetry Orders (From admission, onward)             24 Hour Telemetry Monitoring  Continuous x 24 Hours (Telem)        Expiring   Question:  Reason for 24 Hour Telemetry  Answer:  Decompensated CHF- and any one of the following: continuous diuretic infusion or total diuretic dose >200 mg daily, associated electrolyte derangement (I e  K < 3 0), ionotropic drip (continuous infusion), hx of ventricular arrhythmia, or new EF < 35%                 Telemetry Reviewed: Normal Sinus Rhythm  Indication for Continued Telemetry Use: No indication for continued use  Will discontinue                Imaging: Reviewed radiology reports from this admission including: chest CT scan    Recent Cultures (last 7 days):         Last 24 Hours Medication List:   Current Facility-Administered Medications   Medication Dose Route Frequency Provider Last Rate   • acetaminophen  650 mg Oral Q4H PRN Shannon Zhou MD     • atorvastatin  20 mg Oral Daily Shannon Zhou MD     • clopidogrel  75 mg Oral Daily Shannon Zhou MD     • diltiazem  120 mg Oral Daily Shannon Zhou MD     • docusate sodium  100 mg Oral BID Shannon Zhou MD     • famotidine  20 mg Oral HS Shannon Zhou MD     • furosemide  40 mg Intravenous Daily Shannon Zhou MD     • gabapentin  100 mg Oral 4x Daily Deon Li MD     • guaiFENesin  600 mg Oral Q12H [facility] Deon Li MD     • heparin (porcine)  5,000 Units Subcutaneous [facility] Deon Li MD     • levothyroxine  50 mcg Oral Early Morning Deon Li MD     • memantine  10 mg Oral BID Deon Li MD     • metoprolol  5 mg Intravenous Q6H PRN Deon Li MD     • metoprolol tartrate  25 mg Oral Daily Deon Li MD     • polyethylene glycol  17 g Oral Daily PRN Deon Li MD          Today, Patient Was Seen By: Nandini Delacruz MD    **Please Note: This note may have been constructed using a voice recognition system  **

## 2023-05-21 LAB
ANION GAP SERPL CALCULATED.3IONS-SCNC: 3 MMOL/L (ref 4–13)
BUN SERPL-MCNC: 8 MG/DL (ref 5–25)
CALCIUM SERPL-MCNC: 9.3 MG/DL (ref 8.4–10.2)
CAMPYLOBACTER DNA SPEC NAA+PROBE: NORMAL
CHLORIDE SERPL-SCNC: 96 MMOL/L (ref 96–108)
CO2 SERPL-SCNC: 36 MMOL/L (ref 21–32)
CREAT SERPL-MCNC: 0.58 MG/DL (ref 0.6–1.3)
ERYTHROCYTE [DISTWIDTH] IN BLOOD BY AUTOMATED COUNT: 13.6 % (ref 11.6–15.1)
GFR SERPL CREATININE-BSD FRML MDRD: 81 ML/MIN/1.73SQ M
GLUCOSE SERPL-MCNC: 86 MG/DL (ref 65–140)
HCT VFR BLD AUTO: 34.3 % (ref 34.8–46.1)
HGB BLD-MCNC: 10.6 G/DL (ref 11.5–15.4)
MCH RBC QN AUTO: 28.3 PG (ref 26.8–34.3)
MCHC RBC AUTO-ENTMCNC: 30.9 G/DL (ref 31.4–37.4)
MCV RBC AUTO: 92 FL (ref 82–98)
PLATELET # BLD AUTO: 312 THOUSANDS/UL (ref 149–390)
PMV BLD AUTO: 8.8 FL (ref 8.9–12.7)
POTASSIUM SERPL-SCNC: 4 MMOL/L (ref 3.5–5.3)
RBC # BLD AUTO: 3.75 MILLION/UL (ref 3.81–5.12)
SALMONELLA DNA SPEC QL NAA+PROBE: NORMAL
SHIGA TOXIN STX GENE SPEC NAA+PROBE: NORMAL
SHIGELLA DNA SPEC QL NAA+PROBE: NORMAL
SODIUM SERPL-SCNC: 135 MMOL/L (ref 135–147)
WBC # BLD AUTO: 7.71 THOUSAND/UL (ref 4.31–10.16)

## 2023-05-21 RX ADMIN — HEPARIN SODIUM 5000 UNITS: 5000 INJECTION INTRAVENOUS; SUBCUTANEOUS at 05:40

## 2023-05-21 RX ADMIN — METOPROLOL TARTRATE 25 MG: 25 TABLET, FILM COATED ORAL at 09:20

## 2023-05-21 RX ADMIN — MEMANTINE 10 MG: 10 TABLET ORAL at 09:20

## 2023-05-21 RX ADMIN — GUAIFENESIN 600 MG: 600 TABLET ORAL at 09:20

## 2023-05-21 RX ADMIN — HEPARIN SODIUM 5000 UNITS: 5000 INJECTION INTRAVENOUS; SUBCUTANEOUS at 13:18

## 2023-05-21 RX ADMIN — GABAPENTIN 100 MG: 100 CAPSULE ORAL at 21:18

## 2023-05-21 RX ADMIN — CLOPIDOGREL BISULFATE 75 MG: 75 TABLET ORAL at 09:20

## 2023-05-21 RX ADMIN — DILTIAZEM HYDROCHLORIDE 120 MG: 120 CAPSULE, COATED, EXTENDED RELEASE ORAL at 09:20

## 2023-05-21 RX ADMIN — GABAPENTIN 100 MG: 100 CAPSULE ORAL at 09:20

## 2023-05-21 RX ADMIN — MEMANTINE 10 MG: 10 TABLET ORAL at 17:09

## 2023-05-21 RX ADMIN — GABAPENTIN 100 MG: 100 CAPSULE ORAL at 13:18

## 2023-05-21 RX ADMIN — GUAIFENESIN 600 MG: 600 TABLET ORAL at 21:18

## 2023-05-21 RX ADMIN — LEVOTHYROXINE SODIUM 50 MCG: 50 TABLET ORAL at 05:40

## 2023-05-21 RX ADMIN — HEPARIN SODIUM 5000 UNITS: 5000 INJECTION INTRAVENOUS; SUBCUTANEOUS at 21:18

## 2023-05-21 RX ADMIN — GABAPENTIN 100 MG: 100 CAPSULE ORAL at 17:09

## 2023-05-21 RX ADMIN — FUROSEMIDE 40 MG: 10 INJECTION, SOLUTION INTRAMUSCULAR; INTRAVENOUS at 09:24

## 2023-05-21 RX ADMIN — DOCUSATE SODIUM 100 MG: 100 CAPSULE, LIQUID FILLED ORAL at 09:24

## 2023-05-21 RX ADMIN — ATORVASTATIN CALCIUM 20 MG: 20 TABLET, FILM COATED ORAL at 09:20

## 2023-05-21 RX ADMIN — FAMOTIDINE 20 MG: 20 TABLET ORAL at 21:19

## 2023-05-21 NOTE — PLAN OF CARE
Problem: MOBILITY - ADULT  Goal: Maintain or return to baseline ADL function  Description: INTERVENTIONS:  -  Assess patient's ability to carry out ADLs; assess patient's baseline for ADL function and identify physical deficits which impact ability to perform ADLs (bathing, care of mouth/teeth, toileting, grooming, dressing, etc )  - Assess/evaluate cause of self-care deficits   - Assess range of motion  - Assess patient's mobility; develop plan if impaired  - Assess patient's need for assistive devices and provide as appropriate  - Encourage maximum independence but intervene and supervise when necessary  - Involve family in performance of ADLs  - Assess for home care needs following discharge   - Consider OT consult to assist with ADL evaluation and planning for discharge  - Provide patient education as appropriate  Outcome: Progressing  Goal: Maintains/Returns to pre admission functional level  Description: INTERVENTIONS:  - Perform BMAT or MOVE assessment daily    - Set and communicate daily mobility goal to care team and patient/family/caregiver     - Collaborate with rehabilitation services on mobility goals if consulted  - Perform Range of Motion  - Reposition patient   - Dangle patient   - Stand patient   - Ambulate patient   - Out of bed to chair   - Out of bed for meals  - Out of bed for toileting  - Record patient progress and toleration of activity level   Outcome: Progressing     Problem: Prexisting or High Potential for Compromised Skin Integrity  Goal: Skin integrity is maintained or improved  Description: INTERVENTIONS:  - Identify patients at risk for skin breakdown  - Assess and monitor skin integrity  - Assess and monitor nutrition and hydration status  - Monitor labs   - Assess for incontinence   - Turn and reposition patient  - Assist with mobility/ambulation  - Relieve pressure over bony prominences  - Avoid friction and shearing  - Provide appropriate hygiene as needed including keeping skin clean and dry  - Evaluate need for skin moisturizer/barrier cream  - Collaborate with interdisciplinary team   - Patient/family teaching  - Consider wound care consult   Outcome: Progressing

## 2023-05-21 NOTE — PROGRESS NOTES
[facility]  Progress Note  Name: Kathleen Ugarte  MRN: [id number]  Unit/Bed#: S -01 I Date of Admission: 5/19/2023   Date of Service: 5/21/2023 I Hospital Day: 2    Assessment/Plan   * Acute on chronic respiratory failure with hypoxia [facility])  Assessment & Plan  · Patient has a history of chronic respiratory failure and is on 2 Ls at home per her  · Started having worsening shortness of breath for the last several days, especially on admission day so she was sent to the ED  · Patient required 4 Ls in the ED  · POA x-ray pulmonary fibrosis and some possible pulmonary edema  · POA labs: , anion gap 3, hemoglobin 10 7, otherwise unremarkable  · Likely patient has slight fluid overload in the setting of low pulmonary reserve because of pulmonary fibrosis  · Says that she does not have a pulmonologist, and also does not have a PCP currently since she moved to this area  · Received IV Lasix 40 mg in the ED   ECHO 5/20/2023: There is severe asymmetric hypertrophy of the septal wall  There is mild hypertrophy of the remaining LV walls  The left ventricular ejection fraction is 75%  Systolic function is hyperdynamic  Wall motion is normal  There is an LVOT and mid-cavity dynamic obstruction at rest worsens with Valsalva, consistent with a hyperdynamic hypertrophied LV        Plan:  Hold off on antibiotics, because of no leukocytosis, fevers or signs of infection for now  Continue IV Lasix 40 mg daily  I/O  Daily weights  Continue monitoring fluid status    Pulmonary fibrosis (HCC)  Assessment & Plan  · Has a history of pulmonary fibrosis, is seen on the recent CT scan and x-rays as well  · Patient has chronic respiratory failure because of this and takes around 2 L oxygen at home  · Denies having a pulmonologist    Plan:  Pulmonology consult  Pulmonology recommending diuresis until euvolemic with outpatient follow-up    Acute on chronic diastolic congestive heart failure [facility])  Assessment & Plan  Wt Readings from Last 3 Encounters:   05/21/23 75 7 kg (166 lb 14 2 oz)   05/13/23 78 8 kg (173 lb 11 6 oz)   02/07/23 77 6 kg (171 lb)     · Patient has a history of diastolic CHF  · No Echos available in the chart  · POA , normal for age  · Patient has increased weight with 2 kg [around 4 pounds] in the last 7 days  · In the ED received IV Lasix 40 mg  · Patient denies taking torsemide at home    Plan:  Continue IV Lasix 40 mg  Maintain potassium greater than 4, magnesium greater than 2  Daily weights  I/O  Outpatient cardiology      Loose stools  Assessment & Plan  The episodes of loose stools ordered a stool enteric panel    Plan:  Enteric pathogen panel, follow-up on results    Abdominal pain  Assessment & Plan  · Patient has suprapubic abdominal pain  · Bladder scan showed that patient is retaining urine  · UA was unremarkable    Plan:  Urinary retention protocol ordered    SIADH (syndrome of inappropriate ADH production) ([facility] [address] )  Assessment & Plan  · Has a history of SIADH  · Has been following up with nephrology  · Takes salt tablets 1 g/day    Plan: We will currently hold salt tablets due to CHF exacerbation  Continue monitoring Sodium levels    HTN (hypertension)  Assessment & Plan  · Patient takes Cardizem, metoprolol, torsemide  · Continue metoprolol 25 mg daily and diltiazem 120 mg daily    Hypothyroid  Assessment & Plan  · Continue levothyroxine 50 micrograms           VTE Pharmacologic Prophylaxis: VTE Score: 5 High Risk (Score >/= 5) - Pharmacological DVT Prophylaxis Ordered: heparin  Sequential Compression Devices Ordered  Patient Centered Rounds: I performed bedside rounds with nursing staff today  Discussions with Specialists or Other Care Team Provider: Pulmonology    Education and Discussions with Family / Patient: Attempted to update  (son) via phone  Left voicemail       Current Length of Stay: 2 day(s)  Current Patient Status: Inpatient Discharge Plan: Anticipate discharge in 24-48 hrs to TBD    Code Status: Level 1 - Full Code    Subjective:   Patient was doing well this morning, she denies any shortness of breath or chest pain  She is saturating at approximately 92% on 2 L of oxygen  I did explain to the patient she is here for a combination of an underlying pulmonary disorder causing fibrosis of her lungs in addition to congestive heart failure causing fluid buildup in her lungs resulting in her shortness of breath  Patient denied being in any pain, she appeared comfortable this morning  Did ask about her disposition/discharge planning  Otherwise patient has no other complaints  She denies nausea, vomiting, fevers, chills, abdominal pain, diarrhea, constipation  Objective:     Vitals:   Temp (24hrs), Av 3 °F (36 8 °C), Min:97 8 °F (36 6 °C), Max:99 1 °F (37 3 °C)    Temp:  [97 8 °F (36 6 °C)-99 1 °F (37 3 °C)] 98 5 °F (36 9 °C)  HR:  [72-85] 84  Resp:  [18-19] 18  BP: (125-157)/(58-71) 125/58  SpO2:  [93 %-94 %] 93 %  Body mass index is 30 52 kg/m²  Input and Output Summary (last 24 hours): Intake/Output Summary (Last 24 hours) at 2023 1111  Last data filed at 2023 5440  Gross per 24 hour   Intake --   Output 1050 ml   Net -1050 ml       Physical Exam:   Physical Exam  Vitals and nursing note reviewed  Constitutional:       General: She is not in acute distress  Appearance: She is well-developed  HENT:      Head: Normocephalic and atraumatic  Mouth/Throat:      Pharynx: Oropharynx is clear  Eyes:      Extraocular Movements: Extraocular movements intact  Conjunctiva/sclera: Conjunctivae normal       Pupils: Pupils are equal, round, and reactive to light  Cardiovascular:      Rate and Rhythm: Normal rate and regular rhythm  Heart sounds: No murmur heard  Pulmonary:      Effort: Pulmonary effort is normal  No respiratory distress        Breath sounds: Rales (Crackles throughout bilateral lung fields, in the apex, mid and lower lung fields) present  Abdominal:      General: Bowel sounds are normal       Palpations: Abdomen is soft  Tenderness: There is no abdominal tenderness  Musculoskeletal:         General: No swelling  Cervical back: Neck supple  Right lower leg: Edema present  Left lower leg: Edema present  Skin:     General: Skin is warm and dry  Capillary Refill: Capillary refill takes less than 2 seconds  Coloration: Skin is pale  Neurological:      Mental Status: She is alert and oriented to person, place, and time  Psychiatric:         Mood and Affect: Mood normal          Additional Data:     Labs:  Results from last 7 days   Lab Units 05/21/23 0545 05/20/23 0436 05/19/23  1537   WBC Thousand/uL 7 71   < > 8 82   HEMOGLOBIN g/dL 10 6*   < > 10 7*   HEMATOCRIT % 34 3*   < > 35 2   PLATELETS Thousands/uL 312   < > 343   NEUTROS PCT %  --   --  64   LYMPHS PCT %  --   --  24   MONOS PCT %  --   --  8   EOS PCT %  --   --  3    < > = values in this interval not displayed  Results from last 7 days   Lab Units 05/21/23 0545 05/20/23 0436 05/19/23  1537   SODIUM mmol/L 135   < > 135   POTASSIUM mmol/L 4 0   < > 3 8   CHLORIDE mmol/L 96   < > 95*   CO2 mmol/L 36*   < > 37*   BUN mg/dL 8   < > 13   CREATININE mg/dL 0 58*   < > 0 87   ANION GAP mmol/L 3*   < > 3*   CALCIUM mg/dL 9 3   < > 8 9   ALBUMIN g/dL  --   --  3 6   TOTAL BILIRUBIN mg/dL  --   --  0 33   ALK PHOS U/L  --   --  83   ALT U/L  --   --  10   AST U/L  --   --  21   GLUCOSE RANDOM mg/dL 86   < > 142*    < > = values in this interval not displayed                   Results from last 7 days   Lab Units 05/19/23  1537   PROCALCITONIN ng/ml <0 05       Lines/Drains:  Invasive Devices     Peripheral Intravenous Line  Duration           Peripheral IV 05/19/23 Left Antecubital 2 days          Drain  Duration           Urethral Catheter 16 Fr  <1 day              Urinary Catheter:  Goal for removal: Voiding trial when ambulation improves           Telemetry:  Telemetry Orders (From admission, onward)             24 Hour Telemetry Monitoring  Continuous x 24 Hours (Telem)           Question:  Reason for 24 Hour Telemetry  Answer:  Decompensated CHF- and any one of the following: continuous diuretic infusion or total diuretic dose >200 mg daily, associated electrolyte derangement (I e  K < 3 0), ionotropic drip (continuous infusion), hx of ventricular arrhythmia, or new EF < 35%                 Telemetry Reviewed: Normal Sinus Rhythm  Indication for Continued Telemetry Use: Acute CHF on >200 mg lasix/day or equivalent dose or with new reduced EF                Imaging: Reviewed radiology reports from this admission including: chest xray    Recent Cultures (last 7 days):         Last 24 Hours Medication List:   Current Facility-Administered Medications   Medication Dose Route Frequency Provider Last Rate   • acetaminophen  650 mg Oral Q4H PRN Romeo De La Rosa MD     • atorvastatin  20 mg Oral Daily Romeo De La Rosa MD     • clopidogrel  75 mg Oral Daily Romeo De La Rosa MD     • diltiazem  120 mg Oral Daily Romeo De La Rosa MD     • docusate sodium  100 mg Oral BID Romeo De La Rosa MD     • famotidine  20 mg Oral HS Romeo De La Rosa MD     • furosemide  40 mg Intravenous Daily Romeo De La Rosa MD     • gabapentin  100 mg Oral 4x Daily Romeo De La Rosa MD     • guaiFENesin  600 mg Oral Q12H [address] Romeo De La Rosa MD     • heparin (porcine)  5,000 Units Subcutaneous [facility] Romeo De La Rosa MD     • levothyroxine  50 mcg Oral Early Morning Romeo De La Rosa MD     • memantine  10 mg Oral BID Romeo De La Rosa MD     • metoprolol  5 mg Intravenous Q6H PRN Romeo De La Rosa MD     • metoprolol tartrate  25 mg Oral Daily Romeo De La Rosa MD     • polyethylene glycol  17 g Oral Daily PRN Romeo De La Rosa MD Today, Patient Was Seen By: Leon Aguilar MD    **Please Note: This note may have been constructed using a voice recognition system  **

## 2023-05-21 NOTE — UTILIZATION REVIEW
"Initial Clinical Review    Admission: Date/Time/Statement:   Admission Orders (From admission, onward)     Ordered        05/19/23 1755  INPATIENT ADMISSION  Once                      Orders Placed This Encounter   Procedures   • INPATIENT ADMISSION     Standing Status:   Standing     Number of Occurrences:   1     Order Specific Question:   Level of Care     Answer:   Med Surg [16]     Order Specific Question:   Estimated length of stay     Answer:   More than 2 Midnights     Order Specific Question:   Certification     Answer:   I certify that inpatient services are medically necessary for this patient for a duration of greater than two midnights  See H&P and MD Progress Notes for additional information about the patient's course of treatment  ED Arrival Information     Expected   -    Arrival   5/19/2023 15:01    Acuity   Emergent            Means of arrival   Ambulance    Escorted by   [facility]    Service   Hospitalist    Admission type   Emergency            Arrival complaint   EMS           Chief Complaint   Patient presents with   • Shortness of Breath     Per EMS pt was sitting and suddenly felt SOB  Staff reports o2 \"was in 70's\" Pt states to use oxygen PRN at baseline  Pt denies cp, further symptoms       Initial Presentation: 80 y o  female PMH pulmonary fibrosis on 2 L NC, CKD, CHF, hypertension and dementia to ED by EMS from Nursing facility  presents w Hypoxia on room air in facility w SOB; reports she takes NC off to eat or when she goes to bed  EXAM  POX=79% requiring 3 L , CXR pulmonary vascular congestion per Attending MD read  S/P IV Lasix  Inpatient admission due to acute on chronic respiratory failure w Hypoxia, acute on chronic diastolic HF  Cont IV Lasix daily, monitor CR,  I/O, daily wt, ECHO, monitor fluid status, monitor OFF antibx  Consult pulmonology   Hold home NA Tabs & monitor serum NA    Date: 5/20/2023  Day 2:   Pulmonology  Cont titrate O2 for POX>88%; cont adequate " diuresis until euvolemic; do not rec systemic corticosteroids, bronchoscopy or surgical BX  Attending  + bilateral rales, less SOB  Cont IV Lasix for tonight, monitor US, creatinine  DATE 5/21/2023  DAY 3  Attending MD  Reports she is not happy in hospital feel like a prisoner  COnt w rales, responding to diuresis, Acute respiratory failure likely from acute on chronic diastolic HF   Cont IV diuretics, monitor CR, UO    ED Triage Vitals   Temperature Pulse Respirations Blood Pressure SpO2   05/19/23 1507 05/19/23 1507 05/19/23 1507 05/19/23 1507 05/19/23 1507   98 4 °F (36 9 °C) 95 20 146/65 (S) (!) 79 %      Temp Source Heart Rate Source Patient Position - Orthostatic VS BP Location FiO2 (%)   05/19/23 1507 05/19/23 1507 05/19/23 1507 05/19/23 1507 --   Oral Monitor Sitting Right arm       Pain Score       05/19/23 1827       No Pain          Wt Readings from Last 1 Encounters:   05/21/23 75 7 kg (166 lb 14 2 oz)     Additional Vital Signs:   Date/Time Temp Pulse Resp BP MAP (mmHg) SpO2 Calculated FIO2 (%) - Nasal Cannula Nasal Cannula O2 Flow Rate (L/min) O2 Device Patient Position - Orthostatic VS   05/21/23 15:41:41 98 7 °F (37 1 °C) 78 18 160/76 104 92 % -- -- -- --   05/21/23 09:27:55 -- 84 -- 125/58 80 93 % -- -- -- --   05/21/23 0920 -- 85 -- -- -- -- -- -- -- --   05/21/23 07:32:40 98 5 °F (36 9 °C) -- -- 142/71 95 -- -- -- -- --   05/21/23 00:13:23 97 9 °F (36 6 °C) 85 18 129/69 89 -- -- -- -- --   05/20/23 21:18:12 99 1 °F (37 3 °C) -- 18 149/65 93 -- -- -- -- --   05/20/23 15:55:37 97 8 °F (36 6 °C) 72 19 157/69 98 94 % -- -- -- --   05/20/23 0820 -- 89 -- 143/68 -- -- -- -- -- --   05/20/23 07:22:39 98 9 °F (37 2 °C) 89 16 143/68 93 91 % -- -- -- --   05/19/23 20:48:17 99 1 °F (37 3 °C) 84 -- 147/57 87 94 % -- -- -- --   05/19/23 18:36:26 98 4 °F (36 9 °C) 84 17 144/86 115 95 % -- -- Nasal cannula Lying   05/19/23 1808 -- 85 17 142/63 -- 95 % 32 3 L/min Nasal cannula Lying   05/19/23 1529 -- -- -- -- -- "-- -- -- Nasal cannula --   05/19/23 1515 -- 92 18 146/65 94 95 % 36 4 L/min Nasal cannula Sitting   05/19/23 1507 98 4 °F (36 9 °C) 95 20 146/65 -- 79 % Abnormal    -- -- None (Room air)  Sitting   SpO2: placed on 4L at 05/19/23 1507     Weights (last 14 days)    Date/Time Weight Weight Method Height   05/21/23 0554 75 7 kg (166 lb 14 2 oz) Bed scale --   05/21/23 0546 75 7 kg (166 lb 14 2 oz) -- --   05/20/23 0820 78 1 kg (172 lb 2 9 oz) -- 5' 2\" (1 575 m)   05/20/23 0534 78 1 kg (172 lb 2 9 oz) Bed scale --   05/19/23 1916 77 5 kg (170 lb 13 7 oz)  Bed scale --   Weight: patient is unable to ambulate   took 3 people to transport from stretcher to bed   will try to stand for morning weight at 05/19/23 1916 05/19/23 18:36:26 75 2 kg (165 lb 12 6 oz) Bed scale 5' 2\" (1 575 m)       Pertinent Labs/Diagnostic Test Results:   XR chest 2 views   ED Interpretation by Emy Driscoll DO (05/19 1609)   Extensive fibrotic changes unchanged from previous chest x-ray  Possible pulmonary edema  Final Result by Chiquis Barragan MD (05/20 5053)      No acute cardiopulmonary disease  Extensive pulmonary fibrosis           Results from last 7 days   Lab Units 05/21/23  0545 05/20/23  0436 05/19/23  1537   WBC Thousand/uL 7 71 10 61* 8 82   HEMOGLOBIN g/dL 10 6* 10 2* 10 7*   HEMATOCRIT % 34 3* 33 2* 35 2   PLATELETS Thousands/uL 312 320 343   NEUTROS ABS Thousands/µL  --   --  5 71         Results from last 7 days   Lab Units 05/21/23  0545 05/20/23  0436 05/19/23  1537   SODIUM mmol/L 135 137 135   POTASSIUM mmol/L 4 0 3 9 3 8   CHLORIDE mmol/L 96 96 95*   CO2 mmol/L 36* 36* 37*   ANION GAP mmol/L 3* 5 3*   BUN mg/dL 8 9 13   CREATININE mg/dL 0 58* 0 61 0 87   EGFR ml/min/1 73sq m 81 80 59   CALCIUM mg/dL 9 3 8 8 8 9     Results from last 7 days   Lab Units 05/19/23  1537   AST U/L 21   ALT U/L 10   ALK PHOS U/L 83   TOTAL PROTEIN g/dL 7 2   ALBUMIN g/dL 3 6   TOTAL BILIRUBIN mg/dL 0 33         Results from last 7 " days   Lab Units 05/21/23  0545 05/20/23  0436 05/19/23  1537   GLUCOSE RANDOM mg/dL 86 89 142*             No results found for: BETA-HYDROXYBUTYRATE                   Results from last 7 days   Lab Units 05/19/23  1808 05/19/23  1537   HS TNI 0HR ng/L  --  9   HS TNI 2HR ng/L 11  --    HSTNI D2 ng/L 2  --                  Results from last 7 days   Lab Units 05/19/23  1537   PROCALCITONIN ng/ml <0 05                 Results from last 7 days   Lab Units 05/19/23  1537   BNP pg/mL 184*     Results from last 7 days   Lab Units 05/19/23  1537   FERRITIN ng/mL 35   IRON SATURATION % 8*   IRON ug/dL 28*   TIBC ug/dL 369                                 Results from last 7 days   Lab Units 05/19/23  1956   CLARITY UA  Clear   COLOR UA  Colorless   SPEC GRAV UA  1 007   PH UA  6 5   GLUCOSE UA mg/dl Negative   KETONES UA mg/dl Negative   BLOOD UA  Negative   PROTEIN UA mg/dl Negative   NITRITE UA  Positive*   BILIRUBIN UA  Negative   UROBILINOGEN UA (BE) mg/dl <2 0   LEUKOCYTES UA  Negative   WBC UA /hpf 1-2   RBC UA /hpf None Seen   BACTERIA UA /hpf Occasional   EPITHELIAL CELLS WET PREP /hpf None Seen                         Results from last 7 days   Lab Units 05/20/23  1504   SALMONELLA SP PCR  None Detected   SHIGELLA SP/ENTEROINVASIVE E  COLI (EIEC)  None Detected   CAMPYLOBACTER SP (JEJUNI AND COLI)  None Detected   SHIGA TOXIN 1/SHIGA TOXIN 2  None Detected         ED Treatment:   Medication Administration from 05/19/2023 1501 to 05/19/2023 1825       Date/Time Order Dose Route Action     05/19/2023 1725 EDT furosemide (LASIX) injection 40 mg 40 mg Intravenous Given        Past Medical History:   Diagnosis Date   • CHF (congestive heart failure) (HCC)    • Dementia without behavioral disturbance (HCC)    • High cholesterol    • Hypertension    • Hypothyroid    • Stage 3 chronic kidney disease, unspecified whether stage 3a or 3b CKD ([facility] [address] )    • Stroke ([facility] ) 09/21/2021     Present on Admission:  • Acute on chronic diastolic congestive heart failure (HCC)  • HTN (hypertension)  • SIADH (syndrome of inappropriate ADH production) (HCC)  • Hypothyroid      Admitting Diagnosis: Pulmonary fibrosis (HCC) [J84 10]  Dyspnea [R06 00]  SOB (shortness of breath) [R06 02]  Hypoxia [R09 02]  CHF exacerbation (HCC) [I50 9]  Age/Sex: 80 y o  female  Admission Orders:  NC for POX 88%  Daily wt  I/O    Scheduled Medications:  atorvastatin, 20 mg, Oral, Daily  clopidogrel, 75 mg, Oral, Daily  diltiazem, 120 mg, Oral, Daily  docusate sodium, 100 mg, Oral, BID  famotidine, 20 mg, Oral, HS  furosemide, 40 mg, Intravenous, Daily  gabapentin, 100 mg, Oral, 4x Daily  guaiFENesin, 600 mg, Oral, Q12H EVA  heparin (porcine), 5,000 Units, Subcutaneous, Q8H EVA  levothyroxine, 50 mcg, Oral, Early Morning  memantine, 10 mg, Oral, BID  metoprolol tartrate, 25 mg, Oral, Daily    Continuous IV Infusions:     PRN Meds:  acetaminophen, 650 mg, Oral, Q4H PRN  metoprolol, 5 mg, Intravenous, Q6H PRN  polyethylene glycol, 17 g, Oral, Daily PRN      IP CONSULT TO PULMONOLOGY    Network Utilization Review Department  ATTENTION: Please call with any questions or concerns to [phone] and carefully listen to the prompts so that you are directed to the right person  All voicemails are confidential   Rendell Folds all requests for admission clinical reviews, approved or denied determinations and any other requests to dedicated fax number below belonging to the campus where the patient is receiving treatment   List of dedicated fax numbers for the Facilities:  [address] [place] [address] DENIALS (Administrative/Medical Necessity) [phone]   [address] [place] [address] (Maternity/NICU/Pediatrics) [phone]   [address] [place] [address] [facility] [phone] [address] [facility] 134 082 [facility] [phone]

## 2023-05-21 NOTE — ASSESSMENT & PLAN NOTE
· Patient has suprapubic abdominal pain  · Bladder scan showed that patient is retaining urine  · UA was unremarkable    Plan:  Urinary retention protocol ordered

## 2023-05-21 NOTE — ASSESSMENT & PLAN NOTE
The episodes of loose stools ordered a stool enteric panel    Plan:  Enteric pathogen panel, follow-up on results

## 2023-05-21 NOTE — ASSESSMENT & PLAN NOTE
· Has a history of SIADH  · Has been following up with nephrology  · Takes salt tablets 1 g/day    Plan:   We will currently hold salt tablets due to CHF exacerbation  Continue monitoring Sodium levels

## 2023-05-21 NOTE — ASSESSMENT & PLAN NOTE
· Patient takes Cardizem, metoprolol, torsemide  · Continue metoprolol 25 mg daily and diltiazem 120 mg daily

## 2023-05-21 NOTE — ASSESSMENT & PLAN NOTE
· Patient has a history of chronic respiratory failure and is on 2 Ls at home per her  · Started having worsening shortness of breath for the last several days, especially on admission day so she was sent to the ED  · Patient required 4 Ls in the ED  · POA x-ray pulmonary fibrosis and some possible pulmonary edema  · POA labs: , anion gap 3, hemoglobin 10 7, otherwise unremarkable  · Likely patient has slight fluid overload in the setting of low pulmonary reserve because of pulmonary fibrosis  · Says that she does not have a pulmonologist, and also does not have a PCP currently since she moved to this area  · Received IV Lasix 40 mg in the ED   ECHO 5/20/2023: There is severe asymmetric hypertrophy of the septal wall  There is mild hypertrophy of the remaining LV walls  The left ventricular ejection fraction is 75%  Systolic function is hyperdynamic  Wall motion is normal  There is an LVOT and mid-cavity dynamic obstruction at rest worsens with Valsalva, consistent with a hyperdynamic hypertrophied LV        Plan:  Hold off on antibiotics, because of no leukocytosis, fevers or signs of infection for now  Continue IV Lasix 40 mg daily  I/O  Daily weights  Continue monitoring fluid status

## 2023-05-21 NOTE — ASSESSMENT & PLAN NOTE
Wt Readings from Last 3 Encounters:   05/21/23 75 7 kg (166 lb 14 2 oz)   05/13/23 78 8 kg (173 lb 11 6 oz)   02/07/23 77 6 kg (171 lb)     · Patient has a history of diastolic CHF  · No Echos available in the chart  · POA , normal for age  · Patient has increased weight with 2 kg [around 4 pounds] in the last 7 days  · In the ED received IV Lasix 40 mg  · Patient denies taking torsemide at home    Plan:  Continue IV Lasix 40 mg  Maintain potassium greater than 4, magnesium greater than 2  Daily weights  I/O  Outpatient cardiology

## 2023-05-21 NOTE — QUICK NOTE
At approximately 2PM I provided the patient's  [facility] with a medical update  All questions were answered and Diego Holguin was appreciate of the update   I will continue to provide the patient's family with a medical update while the patient remains in the hospital

## 2023-05-21 NOTE — ASSESSMENT & PLAN NOTE
· Has a history of pulmonary fibrosis, is seen on the recent CT scan and x-rays as well  · Patient has chronic respiratory failure because of this and takes around 2 L oxygen at home  · Denies having a pulmonologist    Plan:  Pulmonology consult  Pulmonology recommending diuresis until euvolemic with outpatient follow-up

## 2023-05-22 PROBLEM — R10.9 ABDOMINAL PAIN: Status: RESOLVED | Noted: 2023-05-19 | Resolved: 2023-05-22

## 2023-05-22 PROBLEM — J98.4 CYSTIC-BULLOUS DISEASE OF LUNG: Status: ACTIVE | Noted: 2023-05-22

## 2023-05-22 LAB
ANION GAP SERPL CALCULATED.3IONS-SCNC: 2 MMOL/L (ref 4–13)
BUN SERPL-MCNC: 7 MG/DL (ref 5–25)
CALCIUM SERPL-MCNC: 8.7 MG/DL (ref 8.4–10.2)
CHLORIDE SERPL-SCNC: 93 MMOL/L (ref 96–108)
CO2 SERPL-SCNC: 36 MMOL/L (ref 21–32)
CREAT SERPL-MCNC: 0.54 MG/DL (ref 0.6–1.3)
ERYTHROCYTE [DISTWIDTH] IN BLOOD BY AUTOMATED COUNT: 13.5 % (ref 11.6–15.1)
GFR SERPL CREATININE-BSD FRML MDRD: 83 ML/MIN/1.73SQ M
GLUCOSE SERPL-MCNC: 94 MG/DL (ref 65–140)
HCT VFR BLD AUTO: 32.2 % (ref 34.8–46.1)
HGB BLD-MCNC: 10.1 G/DL (ref 11.5–15.4)
MAGNESIUM SERPL-MCNC: 1.7 MG/DL (ref 1.9–2.7)
MCH RBC QN AUTO: 28.1 PG (ref 26.8–34.3)
MCHC RBC AUTO-ENTMCNC: 31.4 G/DL (ref 31.4–37.4)
MCV RBC AUTO: 90 FL (ref 82–98)
MRSA NOSE QL CULT: NORMAL
PLATELET # BLD AUTO: 278 THOUSANDS/UL (ref 149–390)
PMV BLD AUTO: 8.9 FL (ref 8.9–12.7)
POTASSIUM SERPL-SCNC: 4 MMOL/L (ref 3.5–5.3)
RBC # BLD AUTO: 3.59 MILLION/UL (ref 3.81–5.12)
SODIUM SERPL-SCNC: 131 MMOL/L (ref 135–147)
WBC # BLD AUTO: 8.82 THOUSAND/UL (ref 4.31–10.16)

## 2023-05-22 RX ORDER — TORSEMIDE 20 MG/1
40 TABLET ORAL 2 TIMES DAILY
Status: DISCONTINUED | OUTPATIENT
Start: 2023-05-22 | End: 2023-05-22

## 2023-05-22 RX ORDER — SODIUM CHLORIDE 1 G/1
1 TABLET ORAL DAILY
Status: DISCONTINUED | OUTPATIENT
Start: 2023-05-22 | End: 2023-05-23 | Stop reason: HOSPADM

## 2023-05-22 RX ORDER — MAGNESIUM SULFATE HEPTAHYDRATE 40 MG/ML
2 INJECTION, SOLUTION INTRAVENOUS ONCE
Status: COMPLETED | OUTPATIENT
Start: 2023-05-22 | End: 2023-05-22

## 2023-05-22 RX ORDER — TORSEMIDE 20 MG/1
20 TABLET ORAL 2 TIMES DAILY
Status: DISCONTINUED | OUTPATIENT
Start: 2023-05-22 | End: 2023-05-23 | Stop reason: HOSPADM

## 2023-05-22 RX ADMIN — GABAPENTIN 100 MG: 100 CAPSULE ORAL at 12:08

## 2023-05-22 RX ADMIN — GABAPENTIN 100 MG: 100 CAPSULE ORAL at 09:39

## 2023-05-22 RX ADMIN — MEMANTINE 10 MG: 10 TABLET ORAL at 17:18

## 2023-05-22 RX ADMIN — MAGNESIUM SULFATE HEPTAHYDRATE 2 G: 40 INJECTION, SOLUTION INTRAVENOUS at 06:43

## 2023-05-22 RX ADMIN — HEPARIN SODIUM 5000 UNITS: 5000 INJECTION INTRAVENOUS; SUBCUTANEOUS at 04:55

## 2023-05-22 RX ADMIN — DOCUSATE SODIUM 100 MG: 100 CAPSULE, LIQUID FILLED ORAL at 17:18

## 2023-05-22 RX ADMIN — LEVOTHYROXINE SODIUM 50 MCG: 50 TABLET ORAL at 04:56

## 2023-05-22 RX ADMIN — MEMANTINE 10 MG: 10 TABLET ORAL at 09:39

## 2023-05-22 RX ADMIN — HEPARIN SODIUM 5000 UNITS: 5000 INJECTION INTRAVENOUS; SUBCUTANEOUS at 13:45

## 2023-05-22 RX ADMIN — GABAPENTIN 100 MG: 100 CAPSULE ORAL at 17:19

## 2023-05-22 RX ADMIN — CLOPIDOGREL BISULFATE 75 MG: 75 TABLET ORAL at 09:39

## 2023-05-22 RX ADMIN — FAMOTIDINE 20 MG: 20 TABLET ORAL at 21:21

## 2023-05-22 RX ADMIN — ATORVASTATIN CALCIUM 20 MG: 20 TABLET, FILM COATED ORAL at 09:39

## 2023-05-22 RX ADMIN — TORSEMIDE 20 MG: 20 TABLET ORAL at 21:23

## 2023-05-22 RX ADMIN — SODIUM CHLORIDE 1 G: 1 TABLET ORAL at 15:08

## 2023-05-22 RX ADMIN — GUAIFENESIN 600 MG: 600 TABLET ORAL at 09:39

## 2023-05-22 RX ADMIN — GABAPENTIN 100 MG: 100 CAPSULE ORAL at 21:21

## 2023-05-22 RX ADMIN — GUAIFENESIN 600 MG: 600 TABLET ORAL at 21:21

## 2023-05-22 RX ADMIN — TORSEMIDE 20 MG: 20 TABLET ORAL at 12:08

## 2023-05-22 RX ADMIN — DOCUSATE SODIUM 100 MG: 100 CAPSULE, LIQUID FILLED ORAL at 09:39

## 2023-05-22 RX ADMIN — HEPARIN SODIUM 5000 UNITS: 5000 INJECTION INTRAVENOUS; SUBCUTANEOUS at 21:21

## 2023-05-22 NOTE — ASSESSMENT & PLAN NOTE
· A previous CT scan done on 5/13 demonstrated diffuse heterogenous appearing cystic lung disease with diffuse groundglass opacities  · According to pulmonology evaluation, patient has no previous pulmonary history  · Etiology for cystic lung disease is unknown at this time, appearance of cystic disease is most concerning for pulmonary Langerhans' cell histiocytosis, or less likely a lymph angio leiomyomatosis, lymphocytic interstitial pneumonia, or Khai Bebe Pooja  · Patient wears 2 L of oxygen at baseline    Plan:  Pulmonology follow-up outpatient following euvolemia in the hospital

## 2023-05-22 NOTE — ASSESSMENT & PLAN NOTE
Wt Readings from Last 3 Encounters:   05/22/23 75 6 kg (166 lb 10 7 oz)   05/13/23 78 8 kg (173 lb 11 6 oz)   02/07/23 77 6 kg (171 lb)     · Patient has a history of diastolic CHF  · No Echos available in the chart  · POA , normal for age  · Patient has increased weight with 2 kg [around 4 pounds] in the last 7 days  · In the ED received IV Lasix 40 mg  · Patient denies taking torsemide at home  · Echocardiogram on 5/20 demonstrating normal left ventricular cavity size, severely increased wall thickness of the left ventricle, asymmetric hypertrophy of the septal wall, mild hypertrophy of remaining left ventricular walls, ejection fraction of 75%, LVOT and mid cavity dynamic obstruction at rest worsens with Valsalva consistent with a hyperdynamic hypertrophied left ventricle, moderate dilation of the left atrium, trace regurgitation of the mitral valve, moderate stenosis of the mitral valve, mild regurgitation of the tricuspid valve, mild increase in the pulmonary artery systolic pressure    Plan:  Transition to torsemide 20 mg twice daily today  Maintain potassium greater than 4, magnesium greater than 2  Daily weights  I/O  Recommend referral for cardiology follow-up  Outpatient pulmonology follow-up  Continue following up with nephrology

## 2023-05-22 NOTE — PROGRESS NOTES
[facility]  Progress Note  Name: Ollie Orozco  MRN: [id number]  Unit/Bed#: S -01 I Date of Admission: 5/19/2023   Date of Service: 5/22/2023 I Hospital Day: 3    Assessment/Plan   * Acute on chronic respiratory failure with hypoxia [facility])  Assessment & Plan  · Patient has a history of chronic respiratory failure and is on 2 Ls at home per her  · Started having worsening shortness of breath for the last several days, especially on admission day so she was sent to the ED  · Patient required 4 Ls in the ED  · POA x-ray pulmonary fibrosis and some possible pulmonary edema  · POA labs: , anion gap 3, hemoglobin 10 7, otherwise unremarkable  · Likely patient has slight fluid overload in the setting of low pulmonary reserve because of pulmonary fibrosis  · Says that she does not have a pulmonologist, and also does not have a PCP currently since she moved to this area  · Received IV Lasix 40 mg in the ED   ECHO 5/20/2023: There is severe asymmetric hypertrophy of the septal wall  There is mild hypertrophy of the remaining LV walls  The left ventricular ejection fraction is 75%  Systolic function is hyperdynamic  Wall motion is normal  There is an LVOT and mid-cavity dynamic obstruction at rest worsens with Valsalva, consistent with a hyperdynamic hypertrophied LV        Plan:  Requiring baseline oxygen requirements 2 L nasal cannula, maintain SPO2 greater than 92%  Torsemide 20 mg twice daily, monitor for response  I/O  Daily weights  Continue monitoring fluid status    Cystic-bullous disease of lung  Assessment & Plan  · A previous CT scan done on 5/13 demonstrated diffuse heterogenous appearing cystic lung disease with diffuse groundglass opacities  · According to pulmonology evaluation, patient has no previous pulmonary history  · Etiology for cystic lung disease is unknown at this time, appearance of cystic disease is most concerning for pulmonary Langerhans' cell histiocytosis, or less likely a lymph angio leiomyomatosis, lymphocytic interstitial pneumonia, or Khai Filipe Caban  · Patient wears 2 L of oxygen at baseline    Plan:  Pulmonology follow-up outpatient following euvolemia in the hospital    Pulmonary fibrosis [facility])  Assessment & Plan  · Has a history of pulmonary fibrosis, is seen on the recent CT scan and x-rays as well  · Patient has chronic respiratory failure because of this and takes around 2 L oxygen at home  · Denies having a pulmonologist    Plan:  Pulmonology consult  Pulmonology recommending diuresis until euvolemic with outpatient follow-up    Acute on chronic diastolic congestive heart failure ([address] )  Assessment & Plan  Wt Readings from Last 3 Encounters:   05/22/23 75 6 kg (166 lb 10 7 oz)   05/13/23 78 8 kg (173 lb 11 6 oz)   02/07/23 77 6 kg (171 lb)     · Patient has a history of diastolic CHF  · No Echos available in the chart  · POA , normal for age  · Patient has increased weight with 2 kg [around 4 pounds] in the last 7 days  · In the ED received IV Lasix 40 mg  · Patient denies taking torsemide at home  · Echocardiogram on 5/20 demonstrating normal left ventricular cavity size, severely increased wall thickness of the left ventricle, asymmetric hypertrophy of the septal wall, mild hypertrophy of remaining left ventricular walls, ejection fraction of 75%, LVOT and mid cavity dynamic obstruction at rest worsens with Valsalva consistent with a hyperdynamic hypertrophied left ventricle, moderate dilation of the left atrium, trace regurgitation of the mitral valve, moderate stenosis of the mitral valve, mild regurgitation of the tricuspid valve, mild increase in the pulmonary artery systolic pressure    Plan:  Transition to torsemide 20 mg twice daily today  Maintain potassium greater than 4, magnesium greater than 2  Daily weights  I/O  Recommend referral for cardiology follow-up  Outpatient pulmonology follow-up  Continue following up with nephrology    Loose stools  Assessment & Plan  The episodes of loose stools ordered a stool enteric panel    Plan:  Negative enteric pathogen panel  Monitor stools    SIADH (syndrome of inappropriate ADH production) ([facility])  Assessment & Plan  · Has a history of SIADH  · Has been following up with nephrology  · Takes salt tablets 1 g/day    Plan: We will currently hold salt tablets due to CHF exacerbation  Continue monitoring Sodium levels    Abdominal pain-resolved as of 5/22/2023  Assessment & Plan  · Patient has suprapubic abdominal pain  · Bladder scan showed that patient is retaining urine  · UA was unremarkable    Plan:  Urinary retention protocol ordered    HTN (hypertension)  Assessment & Plan  · Patient takes Cardizem, metoprolol, torsemide  · Continue metoprolol 25 mg daily and diltiazem 120 mg daily    Plan:  Reduce metoprolol to 12 5 mg daily to improve blood pressure  Diltiazem 120 mg daily, home medication  Increase torsemide dose to 20 mg twice daily in the setting of congestive heart failure exacerbation causing acute respiratory failure with hypoxia  Monitor blood pressure    Hypothyroid  Assessment & Plan  · Continue levothyroxine 50 micrograms           VTE Pharmacologic Prophylaxis: VTE Score: 5 High Risk (Score >/= 5) - Pharmacological DVT Prophylaxis Ordered: heparin  Sequential Compression Devices Ordered  Patient Centered Rounds: I performed bedside rounds with nursing staff today  Discussions with Specialists or Other Care Team Provider: Pulmonology    Education and Discussions with Family / Patient: Updated  (son) via phone  Current Length of Stay: 3 day(s)  Current Patient Status: Inpatient   Discharge Plan: Anticipate discharge tomorrow to prior assisted or independent living facility  Code Status: Level 1 - Full Code    Subjective:   Patient was doing well this morning, she denied any complaints    She reported that her breathing was improved compared to days prior, and she denied any chest pain  She inquired about discharge  We discussed her image findings including cystic changes of her lungs, as well as her history of a nephrectomy on the left side due to cysts  I informed her that we are adjusting her medications to avoid her returning to the hospital   I did ask the patient what we could be doing better for her, in the setting of feeling like she is not receiving appropriate care, and she could not explain why she felt this way, she seemed less agitated this morning compared to yesterday  Otherwise patient is doing well, denies any nausea, vomiting, abdominal pain, constipation, diarrhea, dysuria  Objective:     Vitals:   Temp (24hrs), Av 7 °F (37 1 °C), Min:98 2 °F (36 8 °C), Max:99 3 °F (37 4 °C)    Temp:  [98 2 °F (36 8 °C)-99 3 °F (37 4 °C)] 99 3 °F (37 4 °C)  HR:  [74-96] 84  Resp:  [18] 18  BP: ()/(53-76) 135/71  SpO2:  [91 %-96 %] 96 %  Body mass index is 30 48 kg/m²  Input and Output Summary (last 24 hours): Intake/Output Summary (Last 24 hours) at 2023 1342  Last data filed at 2023 1215  Gross per 24 hour   Intake 200 ml   Output 350 ml   Net -150 ml       Physical Exam:   Physical Exam  Vitals and nursing note reviewed  Constitutional:       General: She is not in acute distress  Appearance: She is well-developed  HENT:      Head: Normocephalic and atraumatic  Mouth/Throat:      Pharynx: No oropharyngeal exudate or posterior oropharyngeal erythema  Eyes:      Extraocular Movements: Extraocular movements intact  Conjunctiva/sclera: Conjunctivae normal       Pupils: Pupils are equal, round, and reactive to light  Cardiovascular:      Rate and Rhythm: Normal rate and regular rhythm  Heart sounds: No murmur heard  Pulmonary:      Effort: Pulmonary effort is normal  No respiratory distress        Breath sounds: Rales (Rales in the bilateral lung fields in the apex, mid, and bases somewhat improved from yesterday) present  Abdominal:      General: Bowel sounds are normal       Palpations: Abdomen is soft  Tenderness: There is no abdominal tenderness  Musculoskeletal:         General: No swelling  Cervical back: Neck supple  Right lower leg: No edema  Left lower leg: No edema  Skin:     General: Skin is warm and dry  Capillary Refill: Capillary refill takes less than 2 seconds  Neurological:      Mental Status: She is alert and oriented to person, place, and time  Psychiatric:         Mood and Affect: Mood normal           Additional Data:     Labs:  Results from last 7 days   Lab Units 05/22/23 0455 05/20/23 0436 05/19/23  1537   WBC Thousand/uL 8 82   < > 8 82   HEMOGLOBIN g/dL 10 1*   < > 10 7*   HEMATOCRIT % 32 2*   < > 35 2   PLATELETS Thousands/uL 278   < > 343   NEUTROS PCT %  --   --  64   LYMPHS PCT %  --   --  24   MONOS PCT %  --   --  8   EOS PCT %  --   --  3    < > = values in this interval not displayed  Results from last 7 days   Lab Units 05/22/23 0455 05/20/23 0436 05/19/23  1537   SODIUM mmol/L 131*   < > 135   POTASSIUM mmol/L 4 0   < > 3 8   CHLORIDE mmol/L 93*   < > 95*   CO2 mmol/L 36*   < > 37*   BUN mg/dL 7   < > 13   CREATININE mg/dL 0 54*   < > 0 87   ANION GAP mmol/L 2*   < > 3*   CALCIUM mg/dL 8 7   < > 8 9   ALBUMIN g/dL  --   --  3 6   TOTAL BILIRUBIN mg/dL  --   --  0 33   ALK PHOS U/L  --   --  83   ALT U/L  --   --  10   AST U/L  --   --  21   GLUCOSE RANDOM mg/dL 94   < > 142*    < > = values in this interval not displayed  Results from last 7 days   Lab Units 05/19/23  1537   PROCALCITONIN ng/ml <0 05       Lines/Drains:  Invasive Devices     Peripheral Intravenous Line  Duration           Peripheral IV 05/19/23 Left Antecubital 3 days              Urinary Catheter:  Goal for removal: No longer needed   Will place order to discontinue               Imaging: Reviewed radiology reports from this admission including: chest xray    Recent Cultures (last 7 days):         Last 24 Hours Medication List:   Current Facility-Administered Medications   Medication Dose Route Frequency Provider Last Rate   • acetaminophen  650 mg Oral Q4H PRN Navid Wallace MD     • atorvastatin  20 mg Oral Daily Navid Wallace MD     • clopidogrel  75 mg Oral Daily Navid Wallace MD     • diltiazem  120 mg Oral Daily Navid Wallace MD     • docusate sodium  100 mg Oral BID Navid Wallace MD     • famotidine  20 mg Oral HS Navid Wallace MD     • gabapentin  100 mg Oral 4x Daily Navid Wallace MD     • guaiFENesin  600 mg Oral Q12H [facility] Navid Wallace MD     • heparin (porcine)  5,000 Units Subcutaneous [facility] Navid Wallace MD     • levothyroxine  50 mcg Oral Early Morning Navid Wallace MD     • memantine  10 mg Oral BID Navid Wallace MD     • metoprolol  5 mg Intravenous Q6H PRN Navid Wallace MD     • [START ON 5/23/2023] metoprolol tartrate  12 5 mg Oral Daily Loren Espinoza MD     • polyethylene glycol  17 g Oral Daily PRN Navid Wallace MD     • torsemide  20 mg Oral BID Loren Espinoza MD          Today, Patient Was Seen By: Loren Espinoza MD    **Please Note: This note may have been constructed using a voice recognition system  **

## 2023-05-22 NOTE — PROGRESS NOTES
"Progress Note - Pulmonary   Jan Linn 80 y o  female MRN: [id number]  Unit/Bed#: S -01 Encounter: [id number]    Assessment/Plan:    1  Acute pulmonary insufficiency on chronic hypoxic and suspected chronic hypercapnic respiratory failure        -Currently on home O2 requirements of 2 L-91%        -Maintain saturations greater than 88%        -Pulmonary toileting: Deep breathing cough, OOB as tolerated, IS Q 1 hr    2  Acute on chronic CHF w/ mild MV stenosis        -5/20/2023-EF 75%           -Patient receiving daily IV Lasix        -Patient overall appears euvolemic    3  Cystic lung disease        -Etiology unclear        -No Indication for bronchoscopy at this time        -We will repeat imaging in about 6 to 8 weeks upon hospital follow-up        -Outpatient follow-up per discharge instructions  -Pulmonary will sign off    Chief Complaint:    \"I am feeling better\"    Subjective:    Vickie Menjivar was comfortably sitting in her bed  She reports that she is at her respiratory baseline  No significant overnight events reported  Patient currently denying any fevers, chills, abscess, headaches, night sweats, pleuritic chest pain, or palpations  Objective:    Vitals: Blood pressure 120/61, pulse 96, temperature 99 3 °F (37 4 °C), resp  rate 18, height 5' 2\" (1 575 m), weight 75 6 kg (166 lb 10 7 oz), SpO2 91 %  ra,Body mass index is 30 48 kg/m²  Intake/Output Summary (Last 24 hours) at 5/22/2023 0736  Last data filed at 5/21/2023 2230  Gross per 24 hour   Intake 730 ml   Output 950 ml   Net -220 ml       Invasive Devices     Peripheral Intravenous Line  Duration           Peripheral IV 05/19/23 Left Antecubital 3 days          Drain  Duration           Urethral Catheter 16 Fr  1 day                Physical Exam:   Physical Exam  Constitutional:       General: She is not in acute distress  Appearance: Normal appearance  She is normal weight  She is not ill-appearing     HENT:      Head: " Normocephalic and atraumatic  Nose: Nose normal  No congestion or rhinorrhea  Mouth/Throat:      Mouth: Mucous membranes are dry  Pharynx: Oropharynx is clear  No oropharyngeal exudate or posterior oropharyngeal erythema  Cardiovascular:      Rate and Rhythm: Normal rate and regular rhythm  Pulses: Normal pulses  Heart sounds: No murmur heard  No friction rub  No gallop  Pulmonary:      Effort: Pulmonary effort is normal  No tachypnea, bradypnea, accessory muscle usage or respiratory distress  Breath sounds: Decreased air movement present  No stridor  Decreased breath sounds present  No wheezing, rhonchi or rales  Comments: Diminished aeration  Chest:      Chest wall: No tenderness  Abdominal:      General: Abdomen is flat  Bowel sounds are normal  There is no distension  Palpations: Abdomen is soft  There is no mass  Musculoskeletal:         General: No swelling or tenderness  Normal range of motion  Cervical back: Normal range of motion  No rigidity or tenderness  Skin:     General: Skin is warm and dry  Coloration: Skin is not jaundiced or pale  Neurological:      General: No focal deficit present  Mental Status: She is alert and oriented to person, place, and time  Mental status is at baseline  Psychiatric:         Mood and Affect: Mood normal          Behavior: Behavior normal          Labs: I have personally reviewed pertinent lab results  , ABG: No results found for: PHART, XVZ9KCO, PO2ART, KBI6BYC, X3NAVIRD, BEART, SOURCE, BNP: No results found for: BNP, CBC:   Lab Results   Component Value Date    WBC 8 82 05/22/2023    HGB 10 1 (L) 05/22/2023    HCT 32 2 (L) 05/22/2023    MCV 90 05/22/2023     05/22/2023    MCH 28 1 05/22/2023    MCHC 31 4 05/22/2023    RDW 13 5 05/22/2023    MPV 8 9 05/22/2023   , CMP:   Lab Results   Component Value Date    SODIUM 131 (L) 05/22/2023    K 4 0 05/22/2023    CL 93 (L) 05/22/2023    CO2 36 (H) 05/22/2023    BUN 7 05/22/2023    CREATININE 0 54 (L) 05/22/2023    CALCIUM 8 7 05/22/2023    EGFR 83 05/22/2023   , PT/INR: No results found for: PT, INR        Imaging and other studies: I have personally reviewed pertinent films in PACS     Chest x-ray 5/19/20  23 pulmonary fibrosis

## 2023-05-22 NOTE — ASSESSMENT & PLAN NOTE
The episodes of loose stools ordered a stool enteric panel    Plan:  Negative enteric pathogen panel  Monitor stools

## 2023-05-22 NOTE — PLAN OF CARE
Problem: PHYSICAL THERAPY ADULT  Goal: Performs mobility at highest level of function for planned discharge setting  See evaluation for individualized goals  Description: Treatment/Interventions: Functional transfer training, LE strengthening/ROM, Therapeutic exercise, Endurance training, Cognitive reorientation, Patient/family training, Equipment eval/education, Bed mobility, Gait training          See flowsheet documentation for full assessment, interventions and recommendations  Note:    Problem List: Decreased strength, Decreased endurance, Impaired balance, Decreased mobility, Decreased cognition, Decreased safety awareness, Obesity, Impaired hearing  Assessment: Pt presents with shortness of breath  Dx: acute on chronic hypoxic respiratory failure, fibrosis, systolic bullous lung disease, acute on chronic diastolic CHF, SIADH, and HTN  order placed for PT eval and tx  pt presents w/ comorbidities of pulmonary fibrosis, hypercholesterolemia CVA, CHF, CKD, and dementia and personal factors of advanced age, mobilizing w/ assistive device, decreased cognition and inability to perform IADLs  pt presents w/ weakness, decreased endurance, impaired cognition, impaired balance, gait deviations, impaired hearing, decreased safety awareness and fall risk  these impairments are evident in findings from physical examination (weakness), mobility assessment (need for standby to min assist w/ all phases of mobility when usually mobilizing independently, tolerance to only 30 feet of ambulation and need for cueing for mobility technique), and Barthel Index: 45/100  pt needed input for task focus and mobility technique  pt is at risk for falls due to physical and safety awareness deficits   pt's clinical presentation is unstable/unpredictable (evident in need for assist w/ all phases of mobility when usually mobilizing independently, tolerance to only 30 feet of ambulation, need for supplemental oxygen in order to maintain oxygen saturation and need for input for task focus and mobility technique)  pt needs inpatient PT tx to improve mobility deficits and progress mobility training as appropriate  discharge recommendation is for PT upon return to F to reduce fall risk and maximize level of functional independence  PT Discharge Recommendation: Return to facility with rehabilitation services    See flowsheet documentation for full assessment

## 2023-05-22 NOTE — ASSESSMENT & PLAN NOTE
· Patient has a history of chronic respiratory failure and is on 2 Ls at home per her  · Started having worsening shortness of breath for the last several days, especially on admission day so she was sent to the ED  · Patient required 4 Ls in the ED  · POA x-ray pulmonary fibrosis and some possible pulmonary edema  · POA labs: , anion gap 3, hemoglobin 10 7, otherwise unremarkable  · Likely patient has slight fluid overload in the setting of low pulmonary reserve because of pulmonary fibrosis  · Says that she does not have a pulmonologist, and also does not have a PCP currently since she moved to this area  · Received IV Lasix 40 mg in the ED   ECHO 5/20/2023: There is severe asymmetric hypertrophy of the septal wall  There is mild hypertrophy of the remaining LV walls  The left ventricular ejection fraction is 75%  Systolic function is hyperdynamic  Wall motion is normal  There is an LVOT and mid-cavity dynamic obstruction at rest worsens with Valsalva, consistent with a hyperdynamic hypertrophied LV        Plan:  Requiring baseline oxygen requirements 2 L nasal cannula, maintain SPO2 greater than 92%  Torsemide 20 mg twice daily, monitor for response  I/O  Daily weights  Continue monitoring fluid status

## 2023-05-22 NOTE — PHYSICAL THERAPY NOTE
PHYSICAL THERAPY EVALUATION NOTE    Patient Name: Jeannie Neville  XNUIS'G Date: 5/22/2023  AGE:   80 y o   Mrn:   [id number]  ADMIT DX:  Pulmonary fibrosis (HCC) [J84 10]  Dyspnea [R06 00]  SOB (shortness of breath) [R06 02]  Hypoxia [R09 02]  CHF exacerbation ([place] [address] ) [I50 9]    Past Medical History:   Diagnosis Date    CHF (congestive heart failure) ([facility])     Dementia without behavioral disturbance (HCC)     High cholesterol     Hypertension     Hypothyroid     Stage 3 chronic kidney disease, unspecified whether stage 3a or 3b CKD ([place] [address] )     Stroke ([place] [address] ) 09/21/2021     Length Of Stay: 3  PHYSICAL THERAPY EVALUATION :    05/22/23 0809   PT Last Visit   PT Visit Date 05/22/23   Pain Assessment   Pain Assessment Tool 0-10   Pain Score No Pain   Restrictions/Precautions   Other Precautions Cognitive; Chair Alarm; Bed Alarm;Multiple lines;O2;Fall Risk;[facility]  [facility]   Home Living   Type of Home Assisted living  [facility])   Additional Comments ambulates w/ walker  independent w/ ADLs  receives assist w/ IADLs  no falls in last 6 months  General   Additional Pertinent History 2L oxygen via nasal cannula  resting pulse ox 95% and 73 BPM, active 93% and 88 BPM    Family/Caregiver Present No   Cognition   Arousal/Participation Cooperative   Orientation Level Oriented to person;Disoriented to place; Disoriented to time; Other (Comment)  (pt was identified w/ full name, ID bracelet)   Following Commands Follows one step commands with increased time or repetition   Subjective   Subjective pt seen supine in bed  agreed to PT eval  denied pain or dizziness  cuing was needed for task focus     RUE Assessment   RUE Assessment WFL   LUE Assessment   LUE Assessment WFL   RLE Assessment   RLE Assessment WFL  (3+ to 4-/5)   LLE Assessment   LLE Assessment WFL  (3+ to 4-/5)   Vision-Basic Assessment   Current Vision Wears glasses all the time   Light Touch   RLE Light Touch Grossly intact   LLE Light Touch Grossly intact   Bed Mobility   Supine to Sit 5  Supervision   Additional items HOB elevated; Bedrails; Increased time required   Transfers   Sit to Stand 4  Minimal assistance   Additional items Assist x 1; Increased time required   Stand to Sit 5  Supervision   Additional items Increased time required   Ambulation/Elevation   Gait pattern Wide GEORGETTE; Foward flexed; Short stride   Gait Assistance 5  Supervision   Additional items Verbal cues; Tactile cues  (for walker positioning, breathing technique)   Assistive Device Rolling walker   Distance 30 feet  (additional not possible due to fatigue, dyspnea)   Balance   Static Sitting Good   Static Standing Fair  (w/ roller walker)   Ambulatory Fair -  (w/ roller walker)   Activity Tolerance   Activity Tolerance Patient limited by fatigue   Nurse Made Aware spoke to Oscar Speaker, Dr Yeni Gallego AP   Assessment   Problem List Decreased strength;Decreased endurance; Impaired balance;Decreased mobility; Decreased cognition;Decreased safety awareness; Obesity; Impaired hearing   Assessment Pt presents with shortness of breath  Dx: acute on chronic hypoxic respiratory failure, fibrosis, systolic bullous lung disease, acute on chronic diastolic CHF, SIADH, and HTN  order placed for PT eval and tx  pt presents w/ comorbidities of pulmonary fibrosis, hypercholesterolemia CVA, CHF, CKD, and dementia and personal factors of advanced age, mobilizing w/ assistive device, decreased cognition and inability to perform IADLs  pt presents w/ weakness, decreased endurance, impaired cognition, impaired balance, gait deviations, impaired hearing, decreased safety awareness and fall risk   these impairments are evident in findings from physical examination (weakness), mobility assessment (need for standby to min assist w/ all phases of mobility when usually mobilizing independently, tolerance to only 30 feet of ambulation and need for cueing for mobility technique), and Barthel Index: 45/100  pt needed input for task focus and mobility technique  pt is at risk for falls due to physical and safety awareness deficits  pt's clinical presentation is unstable/unpredictable (evident in need for assist w/ all phases of mobility when usually mobilizing independently, tolerance to only 30 feet of ambulation, need for supplemental oxygen in order to maintain oxygen saturation and need for input for task focus and mobility technique)  pt needs inpatient PT tx to improve mobility deficits and progress mobility training as appropriate  discharge recommendation is for PT upon return to [facility] to reduce fall risk and maximize level of functional independence  Goals   Patient Goals go home  see my son Loc Freeman  STG Expiration Date 06/01/23   Short Term Goal #1 pt will: Increase bilateral LE strength 1/2 grade to facilitate independent mobility, Perform bed mobility independently to increase level of independence, Perform all transfers independently to improve independence, Ambulate 250 ft  with roller walker independently w/o LOB to improve functional independence, Increase ambulatory balance 1 grade to decrease risk for falls, Tolerate 3 hr OOB to faciliate upright tolerance, Improve Barthel Index score to 65 or greater to facilitate independence and Complete Timed Up and Go or Comfortable Gait Speed to further assess mobility and monitor progress   PT Treatment Day 1   Plan   Treatment/Interventions Functional transfer training;LE strengthening/ROM; Therapeutic exercise; Endurance training;Cognitive reorientation;Patient/family training;Equipment eval/education; Bed mobility;Gait training   PT Frequency 3-5x/wk   Recommendation   PT Discharge Recommendation Return to facility with rehabilitation services   AM-PAC Basic Mobility Inpatient   Turning in Flat Bed Without Bedrails 4   Lying on Back to Sitting on Edge of Flat Bed Without Bedrails 3   Moving Bed to Chair 3   Standing Up From Chair Using Arms 3   Walk in Room 3   Climb 3-5 Stairs With Railing 1   Basic Mobility Inpatient Raw Score 17   Basic Mobility Standardized Score 39 67   Highest Level Of Mobility   -[facility] Goal 5: Stand one or more mins   -[facility] Achieved 7: Walk 25 feet or more   Barthel Index   Feeding 10   Bathing 0   Grooming Score 5   Dressing Score 5   Bladder Score 0   Bowels Score 10   Toilet Use Score 5   Transfers (Bed/Chair) Score 10   Mobility (Level Surface) Score 0   Stairs Score 0   Barthel Index Score 45   Additional Treatment Session   Start Time 0809   End Time 1323   Treatment Assessment Pt agreed to participate in PT intervention  Therapist provided education to pt for mobility technique including transfers and roller walker management and breathing technique w/ nasal cannula  Education was provided due to findings from evaluation  Occasional repetition was needed for limited carryover to be noted  Sit <---> stand transfer w/ supervision  Ambulated 90 feet w/ roller walker w/ supervision and chair follow  additional ambulation was not possible due to fatigue and dyspnea  Pt was found to have improvement after education w/ increased ambulation tolerance  Pt continues to be a fall risk  continued inpatient PT tx is indicated to reduce fall risk factors  Equipment Use roller walker   Additional Treatment Day 1   End of Consult   Patient Position at End of Consult Bedside chair;Bed/Chair alarm activated; All needs within reach     The patient's AM-PAC Basic Mobility Inpatient Short Form Raw Score is 17  A Raw score of greater than 16 suggests the patient may benefit from discharge to home  Please also refer to the recommendation of the Physical Therapist for safe discharge planning  Skilled PT recommended while in hospital and upon DC to progress pt toward treatment goals       Anaid Marie, PT

## 2023-05-22 NOTE — ASSESSMENT & PLAN NOTE
· Patient takes Cardizem, metoprolol, torsemide  · Continue metoprolol 25 mg daily and diltiazem 120 mg daily    Plan:  Reduce metoprolol to 12 5 mg daily to improve blood pressure  Diltiazem 120 mg daily, home medication  Increase torsemide dose to 20 mg twice daily in the setting of congestive heart failure exacerbation causing acute respiratory failure with hypoxia  Monitor blood pressure

## 2023-05-22 NOTE — CASE MANAGEMENT
Case Management Assessment & Discharge Planning Note    Patient name Dori Carl S /S -37 MRN [id number]  : 1933 Date 2023       Current Admission Date: 2023  Current Admission Diagnosis:Acute on chronic respiratory failure with hypoxia [facility])   Patient Active Problem List    Diagnosis Date Noted   • Cystic-bullous disease of lung 2023   • Loose stools 2023   • Acute on chronic respiratory failure with hypoxia ([facility] ) 2023   • Pulmonary fibrosis ([facility] ) 2023   • SIADH (syndrome of inappropriate ADH production) ([facility] ) 2023   • Other specified anemias 02/10/2023   • Narrow anion gap 02/10/2023   • Stage 3a chronic kidney disease ([facility] )    • Metabolic alkalosis    • Acute on chronic diastolic congestive heart failure ([facility] ) 02/10/2023   • Acute pain of left shoulder 2022   • Fall 2022   • Closed fracture of multiple ribs of left side 2022   • Closed wedge compression fracture of T3 vertebra ([facility] ) 2022   • HTN (hypertension) 2022   • Stage 2 chronic kidney disease 2022   • Chronic congestive heart failure ([facility] ) 2022   • Hypothyroid 2022   • Hyponatremia 2022      LOS (days): 3  Geometric Mean LOS (GMLOS) (days):   Days to GMLOS:     OBJECTIVE:    Risk of Unplanned Readmission Score: 16 91         Current admission status: Inpatient       Preferred Pharmacy:   [address] [place] [address] [address] [place] [address]  Phone: [phone] Fax: [phone]    Primary Care Provider: Agustin Clark MD    Primary Insurance: [address] [place] [address]  Secondary Insurance:     ASSESSMENT:    Readmission Root Cause  30 Day Readmission: No    Patient Information  Admitted from[de-identified] Facility [facility]  Mental Status: Alert  During Assessment patient was accompanied by: Not accompanied during assessment  Assessment information provided by[de-identified] Patient, Other - please comment (Norm Vazquez @ [facility])  Primary Caregiver: Self  Support Systems: Son  [place] of Residence: [address] [facility] [facility] do you live in?: [place]  Home entry access options   Select all that apply : No steps to enter home  Type of Current Residence: Facility  Upon entering residence, is there a bedroom on the main floor (no further steps)?: Yes  Upon entering residence, is there a bathroom on the main floor (no further steps)?: Yes  In the last 12 months, was there a time when you were not able to pay the mortgage or rent on time?: No  In the last 12 months, how many places have you lived?: 1  In the last 12 months, was there a time when you did not have a steady place to sleep or slept in a shelter (including now)?: No  Homeless/housing insecurity resource given?: No  Living Arrangements: Other (Comment)  Is patient a ?: No    Activities of Daily Living Prior to Admission  Functional Status: Independent  Completes ADLs independently?: Yes  Ambulates independently?: Yes  Does patient use assisted devices?: Yes  Assisted Devices (DME) used: Walker, Home Oxygen concentrator, Portable Oxygen tanks  Does patient currently own DME?: Yes  What DME does the patient currently own?: Walker, Portable Oxygen tanks, Home Oxygen concentrator  Does patient have a history of Outpatient Therapy (PT/OT)?: Yes  Does the patient have a history of Short-Term Rehab?: Yes  Does patient have a history of HHC?: Yes  Does patient currently have [facility]?: No    Patient Information Continued  Income Source: Pension/group home  Does patient have prescription coverage?: Yes  Within the past 12 months, you worried that your food would run out before you got the money to buy more : Never true  Within the past 12 months, the food you bought just didn't last and you didn't have money to get more : Never true  Food insecurity resource given?: No  Does patient receive dialysis treatments?: No  Does patient have a history of substance abuse?: No  Does patient have a history of Mental Health Diagnosis?: No    PHQ 2/9 Screening   Reviewed PHQ 2/9 Depression Screening Score?: No    Means of Transportation  Means of Transport to Appts[de-identified] Family transport  In the past 12 months, has lack of transportation kept you from medical appointments or from getting medications?: No  In the past 12 months, has lack of transportation kept you from meetings, work, or from getting things needed for daily living?: No  Was application for public transport provided?: No    DISCHARGE DETAILS:    Discharge planning discussed with[de-identified] Patient  Freedom of Choice: Yes  Comments - Freedom of Choice: Choice is to return to home facility  CM contacted family/caregiver?: No- see comments (VM left for son Mary Alice Haile))  Were Treatment Team discharge recommendations reviewed with patient/caregiver?: Yes  Did patient/caregiver verbalize understanding of patient care needs?: N/A- going to facility  Were patient/caregiver advised of the risks associated with not following Treatment Team discharge recommendations?: Yes    DME Referral Provided  Referral made for DME?: No    Other Referral/Resources/Interventions Provided:  Interventions: Facility Return  Referral Comments:   JOCELYN s/w Toro Thurman, then [facility] regarding patient's return  [facility] confirmed patient is able to return tomorrow and PT/OT will be coordinate for patient back at facility  [facility] stating no need for orders  Lede provided phone/fax # for report/DCI upon D/C and requesting phone call tomorrow morning when transportation is confirmed       Would you like to participate in our [facility] e service program?  : No - Declined    Treatment Team Recommendation: Assisted Living, Facility Return  Discharge Destination Plan[de-identified] Assisted Living, Facility Return ([employer])  Transport at Discharge : [address] [facility] ([address] [facility] [address])    Accepting Facility Name, [address] [facility] [address] ALYSIA [facility]i  Receiving Facility/Agency Phone Number: [phone] [address] [facility]  Facility/Agency Fax Number: [phone]  [facility] [address] [place] [address] Number: [id number] , IRAW, ACSW, ACM, [facility]  05/22/23 2:19 PM

## 2023-05-23 VITALS
WEIGHT: 160.27 LBS | TEMPERATURE: 98.7 F | RESPIRATION RATE: 20 BRPM | HEART RATE: 89 BPM | DIASTOLIC BLOOD PRESSURE: 54 MMHG | OXYGEN SATURATION: 93 % | SYSTOLIC BLOOD PRESSURE: 117 MMHG | BODY MASS INDEX: 29.49 KG/M2 | HEIGHT: 62 IN

## 2023-05-23 LAB
ANION GAP SERPL CALCULATED.3IONS-SCNC: 4 MMOL/L (ref 4–13)
BUN SERPL-MCNC: 10 MG/DL (ref 5–25)
CALCIUM SERPL-MCNC: 8.9 MG/DL (ref 8.4–10.2)
CHLORIDE SERPL-SCNC: 95 MMOL/L (ref 96–108)
CO2 SERPL-SCNC: 35 MMOL/L (ref 21–32)
CREAT SERPL-MCNC: 0.64 MG/DL (ref 0.6–1.3)
ERYTHROCYTE [DISTWIDTH] IN BLOOD BY AUTOMATED COUNT: 13.5 % (ref 11.6–15.1)
GFR SERPL CREATININE-BSD FRML MDRD: 79 ML/MIN/1.73SQ M
GLUCOSE SERPL-MCNC: 92 MG/DL (ref 65–140)
HCT VFR BLD AUTO: 34.4 % (ref 34.8–46.1)
HGB BLD-MCNC: 10.5 G/DL (ref 11.5–15.4)
MAGNESIUM SERPL-MCNC: 1.9 MG/DL (ref 1.9–2.7)
MCH RBC QN AUTO: 28 PG (ref 26.8–34.3)
MCHC RBC AUTO-ENTMCNC: 30.5 G/DL (ref 31.4–37.4)
MCV RBC AUTO: 92 FL (ref 82–98)
PLATELET # BLD AUTO: 263 THOUSANDS/UL (ref 149–390)
PMV BLD AUTO: 9.2 FL (ref 8.9–12.7)
POTASSIUM SERPL-SCNC: 4.3 MMOL/L (ref 3.5–5.3)
RBC # BLD AUTO: 3.75 MILLION/UL (ref 3.81–5.12)
SODIUM SERPL-SCNC: 134 MMOL/L (ref 135–147)
WBC # BLD AUTO: 7.96 THOUSAND/UL (ref 4.31–10.16)

## 2023-05-23 RX ORDER — TORSEMIDE 20 MG/1
20 TABLET ORAL 2 TIMES DAILY
Qty: 60 TABLET | Refills: 0 | Status: SHIPPED | OUTPATIENT
Start: 2023-05-23 | End: 2023-06-22

## 2023-05-23 RX ORDER — POLYETHYLENE GLYCOL 3350 17 G/17G
17 POWDER, FOR SOLUTION ORAL DAILY
Status: DISCONTINUED | OUTPATIENT
Start: 2023-05-23 | End: 2023-05-23 | Stop reason: HOSPADM

## 2023-05-23 RX ORDER — BISACODYL 10 MG
10 SUPPOSITORY, RECTAL RECTAL ONCE
Status: DISCONTINUED | OUTPATIENT
Start: 2023-05-23 | End: 2023-05-23 | Stop reason: HOSPADM

## 2023-05-23 RX ADMIN — GABAPENTIN 100 MG: 100 CAPSULE ORAL at 12:34

## 2023-05-23 RX ADMIN — HEPARIN SODIUM 5000 UNITS: 5000 INJECTION INTRAVENOUS; SUBCUTANEOUS at 05:06

## 2023-05-23 RX ADMIN — MEMANTINE 10 MG: 10 TABLET ORAL at 09:21

## 2023-05-23 RX ADMIN — Medication 12.5 MG: at 09:21

## 2023-05-23 RX ADMIN — SODIUM CHLORIDE 1 G: 1 TABLET ORAL at 09:20

## 2023-05-23 RX ADMIN — TORSEMIDE 20 MG: 20 TABLET ORAL at 09:21

## 2023-05-23 RX ADMIN — ATORVASTATIN CALCIUM 20 MG: 20 TABLET, FILM COATED ORAL at 09:20

## 2023-05-23 RX ADMIN — DOCUSATE SODIUM 100 MG: 100 CAPSULE, LIQUID FILLED ORAL at 09:20

## 2023-05-23 RX ADMIN — CLOPIDOGREL BISULFATE 75 MG: 75 TABLET ORAL at 09:20

## 2023-05-23 RX ADMIN — DILTIAZEM HYDROCHLORIDE 120 MG: 120 CAPSULE, COATED, EXTENDED RELEASE ORAL at 09:21

## 2023-05-23 RX ADMIN — GUAIFENESIN 600 MG: 600 TABLET ORAL at 09:21

## 2023-05-23 RX ADMIN — GABAPENTIN 100 MG: 100 CAPSULE ORAL at 09:21

## 2023-05-23 RX ADMIN — POLYETHYLENE GLYCOL 3350 17 G: 17 POWDER, FOR SOLUTION ORAL at 09:20

## 2023-05-23 RX ADMIN — LEVOTHYROXINE SODIUM 50 MCG: 50 TABLET ORAL at 05:06

## 2023-05-23 NOTE — DISCHARGE INSTR - AVS FIRST PAGE
Dear Angelica Oliva,     It was our pleasure to care for you here at [facility]  It is our hope that we were always able to exceed the expected standards for your care during your stay  You were hospitalized due to acute respiratory failure with hypoxia secondary to congestive heart failure exacerbation  You were cared for on the [facility] third floor by Jose Alcantar MD under the service of Isa Short MD with the [facility] who covers for your primary care physician (PCP), Adiel Storm MD, while you were hospitalized  If you have any questions or concerns related to this hospitalization, you may contact us at [phone]  For follow up as well as any medication refills, we recommend that you follow up with your primary care physician  A registered nurse will reach out to you by phone within a few days after your discharge to answer any additional questions that you may have after going home    However, at this time we provide for you here, the most important instructions / recommendations at discharge:     Notable Medication Adjustments -   Please increase the dose of your torsemide to 20 mg twice daily  Testing Required after Discharge -   None  ** Please contact your PCP to request testing orders for any of the testing recommended here **  Important follow up information -   Please follow-up with cardiology, their information is attached  Please follow-up with pulmonology, their information is attached  Other Instructions -   Please do not hesitate to return to the emergency department if you experience severe shortness of breath, respiratory distress, significant swelling of the lower extremities, lightheadedness, dizziness, and/or chest pain  Please review this entire after visit summary as additional general instructions including medication list, appointments, activity, diet, any pertinent wound care, and other additional recommendations from your care team that may be provided for you        Sincerely,     Stewart Ferrer MD

## 2023-05-23 NOTE — DISCHARGE SUMMARY
[facility]  Discharge- [facility] 6/8/1933, 80 y o  female MRN: [id number]  Unit/Bed#: S -01 Encounter: [id number]  Primary Care Provider: Nishant Montgomery MD   Date and time admitted to hospital: 5/19/2023  3:02 PM    * Acute on chronic respiratory failure with hypoxia [facility])  Assessment & Plan  · Patient has a history of chronic respiratory failure and is on 2 Ls at home per her  · Started having worsening shortness of breath for the last several days, especially on admission day so she was sent to the ED  · Patient required 4 Ls in the ED  · POA x-ray pulmonary fibrosis and some possible pulmonary edema  · POA labs: , anion gap 3, hemoglobin 10 7, otherwise unremarkable  · Likely patient has slight fluid overload in the setting of low pulmonary reserve because of pulmonary fibrosis  · Says that she does not have a pulmonologist, and also does not have a PCP currently since she moved to this area  · Received IV Lasix 40 mg in the ED   ECHO 5/20/2023: There is severe asymmetric hypertrophy of the septal wall  There is mild hypertrophy of the remaining LV walls  The left ventricular ejection fraction is 75%  Systolic function is hyperdynamic  Wall motion is normal  There is an LVOT and mid-cavity dynamic obstruction at rest worsens with Valsalva, consistent with a hyperdynamic hypertrophied LV        Plan:  Requiring baseline oxygen requirements 2 L nasal cannula, maintain SPO2 greater than 92%  Torsemide 20 mg twice daily, monitor for response  I/O  Daily weights  Continue monitoring fluid status    Cystic-bullous disease of lung  Assessment & Plan  · A previous CT scan done on 5/13 demonstrated diffuse heterogenous appearing cystic lung disease with diffuse groundglass opacities  · According to pulmonology evaluation, patient has no previous pulmonary history  · Etiology for cystic lung disease is unknown at this time, appearance of cystic disease is most concerning for pulmonary Langerhans' cell histiocytosis, or less likely a lymph angio leiomyomatosis, lymphocytic interstitial pneumonia, or Khai Ernesto Bosworth  · Patient wears 2 L of oxygen at baseline    Plan:  Pulmonology follow-up outpatient following euvolemia in the hospital    Pulmonary fibrosis [facility])  Assessment & Plan  · Has a history of pulmonary fibrosis, is seen on the recent CT scan and x-rays as well  · Patient has chronic respiratory failure because of this and takes around 2 L oxygen at home  · Denies having a pulmonologist    Plan:  Pulmonology consult  Pulmonology recommending diuresis until euvolemic with outpatient follow-up    Acute on chronic diastolic congestive heart failure ([address] )  Assessment & Plan  Wt Readings from Last 3 Encounters:   05/23/23 72 7 kg (160 lb 4 4 oz)   05/13/23 78 8 kg (173 lb 11 6 oz)   02/07/23 77 6 kg (171 lb)     · Patient has a history of diastolic CHF  · No Echos available in the chart  · POA , normal for age  · Patient has increased weight with 2 kg [around 4 pounds] in the last 7 days  · In the ED received IV Lasix 40 mg  · Patient denies taking torsemide at home  · Echocardiogram on 5/20 demonstrating normal left ventricular cavity size, severely increased wall thickness of the left ventricle, asymmetric hypertrophy of the septal wall, mild hypertrophy of remaining left ventricular walls, ejection fraction of 75%, LVOT and mid cavity dynamic obstruction at rest worsens with Valsalva consistent with a hyperdynamic hypertrophied left ventricle, moderate dilation of the left atrium, trace regurgitation of the mitral valve, moderate stenosis of the mitral valve, mild regurgitation of the tricuspid valve, mild increase in the pulmonary artery systolic pressure    Plan:   Torsemide 20 mg twice daily  Maintain potassium greater than 4, magnesium greater than 2  Daily weights  I/O  Recommend referral for cardiology follow-up  Outpatient pulmonology follow-up  Continue following up with nephrology    Loose stools  Assessment & Plan  The episodes of loose stools ordered a stool enteric panel    Plan:  Negative enteric pathogen panel  Monitor stools    SIADH (syndrome of inappropriate ADH production) (HCC)  Assessment & Plan  · Has a history of SIADH  · Has been following up with nephrology  · Takes salt tablets 1 g/day    Plan:  Reinitiation of salt tablets 1 g daily  Continue monitoring Sodium levels    HTN (hypertension)  Assessment & Plan  · Patient takes Cardizem, metoprolol, torsemide  · Continue metoprolol 25 mg daily and diltiazem 120 mg daily    Plan:  Reduce metoprolol to 12 5 mg daily to improve blood pressure  Diltiazem 120 mg daily, home medication  Increase torsemide dose to 20 mg twice daily in the setting of congestive heart failure exacerbation causing acute respiratory failure with hypoxia  Monitor blood pressure    Hypothyroid  Assessment & Plan  · Continue levothyroxine 50 micrograms      Medical Problems     Resolved Problems  Date Reviewed: 5/23/2023          Resolved    Abdominal pain 5/22/2023     Resolved by  Anneliese De Luna MD        Discharging Resident: Anneliese De Luna MD  Discharging Attending: Fabiola Lewis MD  PCP: Julissa Villa MD  Admission Date:   Admission Orders (From admission, onward)     Ordered        05/19/23 [address]  Once                      Discharge Date: 05/23/23    Consultations During Hospital Stay:  · Pulmonology    Procedures Performed:   · None    Significant Findings / Test Results:   · Chest x-ray demonstrating extensive pulmonary fibrosis  · CT scan of the chest demonstrating extensive cystic and fibrotic changes of the bilateral lungs  · Moderate compression fracture deformity of T11    Incidental Findings:   · None    Test Results Pending at Discharge (will require follow up):   · None     Outpatient Tests Requested:  · Repeat BMP in 1 week to monitor creatinine level    Complications: None    Reason for Admission: Acute respiratory failure with hypoxia secondary to congestive heart failure exacerbation    Hospital Course:   Therese Bruce is a 80 y o  female patient who originally presented to the hospital on 5/19/2023 due to shortness of breath  Patient was found to be in a congestive heart failure exacerbation with volume overload in the bilateral lungs as evident on CT scan of the chest demonstrating pulmonary edema and small bilateral pleural effusions and lower extremity pitting edema  She required increased oxygen requirements, does wear oxygen 2 L at baseline, however secondary to hypoxia down to 70% she required up to 4 L of oxygen  Patient was diuresed with IV Lasix and the decision was made to increase her torsemide dose from 20 mg daily to 20 mg twice daily  She was instructed to follow-up with pulmonology and cardiology on an outpatient basis, pulmonology for her cystic changes of the lungs, and cardiology to manage her congestive heart failure  Interestingly, patient does report that she had a left-sided nephrectomy due to cystic changes in her kidney  Patient was experiencing cognitive deficits during her inpatient admission, she does have a history of dementia, and would frequently forget where she was or meeting certain members of the care team     Please see above list of diagnoses and related plan for additional information  Condition at Discharge: fair    Discharge Day Visit / Exam:   Subjective: Patient was seen this morning, she had no major complaints, reported feeling well and denied any shortness of breath or chest pain  I did inform the patient that she would be discharged later this afternoon with us increasing her torsemide dose  She was agreeable for this plan and was appreciative of the assistance we provided her with in the hospital   She reported that she was in [address] [place] [address] this morning, which is where she grew up/raised her children    I did inform the patient of "the importance of her following up with pulmonology and cardiology for ongoing management of her cystic lung disease and her congestive heart failure  Vitals: Blood Pressure: 117/54 (05/23/23 0736)  Pulse: 89 (05/23/23 0736)  Temperature: 98 7 °F (37 1 °C) (05/23/23 0736)  Temp Source: Oral (05/23/23 0736)  Respirations: 20 (05/23/23 0736)  Height: 5' 2\" (157 5 cm) (05/20/23 0820)  Weight - Scale: 72 7 kg (160 lb 4 4 oz) (05/23/23 0536)  SpO2: 93 % (05/23/23 0900)  Exam:   Physical Exam  Vitals and nursing note reviewed  Constitutional:       General: She is not in acute distress  Appearance: She is well-developed  HENT:      Head: Normocephalic and atraumatic  Mouth/Throat:      Pharynx: Oropharynx is clear  Eyes:      Conjunctiva/sclera: Conjunctivae normal    Cardiovascular:      Rate and Rhythm: Normal rate and regular rhythm  Heart sounds: No murmur heard  Pulmonary:      Effort: Pulmonary effort is normal  No respiratory distress  Breath sounds: Normal breath sounds  Comments: Velcro-like sounds in the bilateral lung fields in the apex, mid, and bases  Abdominal:      General: Bowel sounds are normal       Palpations: Abdomen is soft  Tenderness: There is no abdominal tenderness  Musculoskeletal:         General: No swelling  Cervical back: Neck supple  Right lower leg: No edema  Left lower leg: No edema  Skin:     General: Skin is warm and dry  Capillary Refill: Capillary refill takes less than 2 seconds  Neurological:      Mental Status: She is alert and oriented to person, place, and time  Psychiatric:         Mood and Affect: Mood normal           Discussion with Family: Updated  (son) via phone  Discharge instructions/Information to patient and family:   See after visit summary for information provided to patient and family        Provisions for Follow-Up Care:  See after visit summary for information related to follow-up " care and any pertinent home health orders  Disposition:   Assisted Living Facility at [facility]    Planned Readmission: None    Discharge Medications:  See after visit summary for reconciled discharge medications provided to patient and/or family        **Please Note: This note may have been constructed using a voice recognition system**

## 2023-05-23 NOTE — PLAN OF CARE
Problem: OCCUPATIONAL THERAPY ADULT  Goal: Performs self-care activities at highest level of function for planned discharge setting  See evaluation for individualized goals  Description: Treatment Interventions: ADL retraining, Functional transfer training, Endurance training, Cognitive reorientation, Patient/family training, Equipment evaluation/education, Compensatory technique education, Continued evaluation, Energy conservation, Activityengagement          See flowsheet documentation for full assessment, interventions and recommendations  Note: Limitation: Decreased ADL status, Decreased Safe judgement during ADL, Decreased cognition, Decreased endurance, Decreased self-care trans, Decreased high-level ADLs, Mood limitation (activity tolerance, forward functional reach, functional standing tolerance and overall strength, safety awareness, insight into deficits, orientation and problem solving, response time)  Prognosis: Good  Assessment: Pt is a 80 y o  female seen for OT evaluation s/p admit to [address] [place] [address] on 5/19/2023 w/Acute on chronic respiratory failure with [facility] )  Prior to admission, pt was living at [address], (I) with ADLs, (A) with IADLs  Personal and environmental factors affecting patient at time of evaluation include current habits and behavioral patterns, difficulty completing ADLs and difficulty completing IADLs  Personal factors supporting patient at time of evaluation include social support and accessible home environment  Based upon this evaluation, pt is functioning below baseline  Pt will benefit from continued skilled inpatient OT to maximize safety, level of independence overall performance in ADLs, functional transfers, functional mobility to return to functional baseline/highest level of function       OT Discharge Recommendation: Return to facility with rehabilitation services     Walt Alva, OTGUERO/L  [phone] [id number]  [address] [facility] [id number]

## 2023-05-23 NOTE — ASSESSMENT & PLAN NOTE
· Has a history of SIADH  · Has been following up with nephrology  · Takes salt tablets 1 g/day    Plan:  Reinitiation of salt tablets 1 g daily  Continue monitoring Sodium levels

## 2023-05-23 NOTE — ASSESSMENT & PLAN NOTE
Wt Readings from Last 3 Encounters:   05/23/23 72 7 kg (160 lb 4 4 oz)   05/13/23 78 8 kg (173 lb 11 6 oz)   02/07/23 77 6 kg (171 lb)     · Patient has a history of diastolic CHF  · No Echos available in the chart  · POA , normal for age  · Patient has increased weight with 2 kg [around 4 pounds] in the last 7 days  · In the ED received IV Lasix 40 mg  · Patient denies taking torsemide at home  · Echocardiogram on 5/20 demonstrating normal left ventricular cavity size, severely increased wall thickness of the left ventricle, asymmetric hypertrophy of the septal wall, mild hypertrophy of remaining left ventricular walls, ejection fraction of 75%, LVOT and mid cavity dynamic obstruction at rest worsens with Valsalva consistent with a hyperdynamic hypertrophied left ventricle, moderate dilation of the left atrium, trace regurgitation of the mitral valve, moderate stenosis of the mitral valve, mild regurgitation of the tricuspid valve, mild increase in the pulmonary artery systolic pressure    Plan:   Torsemide 20 mg twice daily  Maintain potassium greater than 4, magnesium greater than 2  Daily weights  I/O  Recommend referral for cardiology follow-up  Outpatient pulmonology follow-up  Continue following up with nephrology

## 2023-05-23 NOTE — CASE MANAGEMENT
Case Management Discharge Planning Note    Patient name Milton Palencia  Location S /S -00 MRN [id number]  : 1933 Date 2023       Current Admission Date: 2023  Current Admission Diagnosis:Acute on chronic respiratory failure with hypoxia [facility])   Patient Active Problem List    Diagnosis Date Noted   • Cystic-bullous disease of lung 2023   • Loose stools 2023   • Acute on chronic respiratory failure with hypoxia ([address] ) 2023   • Pulmonary fibrosis ([address] ) 2023   • SIADH (syndrome of inappropriate ADH production) ([facility] ) 2023   • Other specified anemias 02/10/2023   • Narrow anion gap 02/10/2023   • Stage 3a chronic kidney disease ([facility] )    • Metabolic alkalosis    • Acute on chronic diastolic congestive heart failure ([facility] ) 02/10/2023   • Acute pain of left shoulder 2022   • Fall 2022   • Closed fracture of multiple ribs of left side 2022   • Closed wedge compression fracture of T3 vertebra ([facility] ) 2022   • HTN (hypertension) 2022   • Stage 2 chronic kidney disease 2022   • Chronic congestive heart failure ([address] ) 2022   • Hypothyroid 2022   • Hyponatremia 2022      LOS (days): 4  Geometric Mean LOS (GMLOS) (days):   Days to GMLOS:     OBJECTIVE:  Risk of Unplanned Readmission Score: 17 52         Current admission status: Inpatient   Preferred Pharmacy:   [address] [place] [address]  [address] [address]  Phone: [phone] Fax: [phone]    Primary Care Provider: Liberty Son MD    Primary Insurance: [address] [place] [address]  Secondary Insurance:     DISCHARGE DETAILS:    Discharge planning discussed with[de-identified] Patient  Freedom of Choice: Yes     CM contacted family/caregiver?: No- see comments (VM left for son Va Lira))  Were Treatment Team discharge recommendations reviewed with patient/caregiver?: Yes  Did patient/caregiver verbalize understanding of patient care needs?: N/A- going to facility  Were patient/caregiver advised of the risks associated with not following Treatment Team discharge recommendations?: Yes    Contacts  Patient Contacts: Nati Mercadofabienne Method: Phone  Phone Number: [phone]  Reason/Outcome: Emergency Contact, Discharge [address]         Is the patient interested in Caitlyn Joya at discharge?: No    DME Referral Provided  Referral made for DME?: No    Other Referral/Resources/Interventions Provided:  Interventions: Facility Return  Referral Comments: Call made to [facility]  Junior Holly at facility made aware of transport @ 1300 today      Would you like to participate in our [address] service program?  : No - Declined    Treatment Team Recommendation: Assisted Living, Facility Return  Discharge Destination Plan[de-identified] Assisted Living, Facility Return ([facility])  Transport at Discharge : [address]: Yes  Number/Name of Dispatcher: ROUNDTRIP  Transported by [employer] and Unit #): [facility]  ETA of Transport (Date): 05/23/23  ETA of Transport (Time): [address] [facility] Name, [address] : Rosario ARREOLA [place], [address]  Receiving Facility/Agency Phone Number: [phone] [facility]  Facility/Agency Fax Number: [phone]  [facility] [address] [place] [address] Number: [id number], ACSW, ACM, CCM  05/23/23 12:22 PM

## 2023-05-23 NOTE — OCCUPATIONAL THERAPY NOTE
Occupational Therapy Evaluation     Patient Name: Nelson CHAUDHARI Date: 5/23/2023  Problem List  Principal Problem:    Acute on chronic respiratory failure with hypoxia (HCC)  Active Problems:    HTN (hypertension)    Hypothyroid    Acute on chronic diastolic congestive heart failure (HCC)    SIADH (syndrome of inappropriate ADH production) ([facility] )    Pulmonary fibrosis (HCC)    Loose stools    Cystic-bullous disease of lung    Past Medical History  Past Medical History:   Diagnosis Date    CHF (congestive heart failure) (HCC)     Dementia without behavioral disturbance (HCC)     High cholesterol     Hypertension     Hypothyroid     Stage 3 chronic kidney disease, unspecified whether stage 3a or 3b CKD ([facility] )     Stroke ([place] [id number] ) 09/21/2021     Past Surgical History  Past Surgical History:   Procedure Laterality Date    APPENDECTOMY      CHOLECYSTECTOMY      NEPHRECTOMY Left     TOTAL ABDOMINAL HYSTERECTOMY W/ BILATERAL SALPINGOOPHORECTOMY           05/23/23 1007   OT Last Visit   OT Visit Date 05/23/23   Note Type   Note type Evaluation   Pain Assessment   Pain Assessment Tool 0-10   Pain Score No Pain   Restrictions/Precautions   Weight Bearing Precautions Per Order No   Other Precautions Cognitive; Bed Alarm; Chair Alarm; Fall Risk;O2;Multiple lines  (2L O2 NC)   Home Living   Type of Home Assisted living  ([address] [facility])   Home Layout One level;Performs ADLs on one level; Able to live on main level with bedroom/bathroom   [address] [facility]  ([place])   Additional Comments Pt poor historian, per chart review, pt admitted from [address] with ADLs; Independent with functional mobility; Needs assistance with [address] staff   Receives Help From Other (Comment); Family  (Facility staff at [facility])   IADLs Family/Friend/Other provides transportation; Family/Friend/Other provides meals; Family/Friend/Other provides medication "management   Falls in the last 6 months 0  (per pt)   Vocational Retired   Comments Pt poor historian due to baseline cogntive deficits  Per chart review, pt is (I) with ADLs, RW for mobility, facility completes IADLs  Lifestyle   Autonomy Pt admitted from [facility] and is (I) with ADLs, RW for mobility   Reciprocal Relationships Supportive facility staff and son   Service to Others Retired    Intrinsic Gratification Pt enjoys talking to her son on the phone   General   Family/Caregiver Present No   Subjective   Subjective \"I'm just so confused where I am\"   ADL   Where Assessed Standing at sink   [address] 5  Supervision/Setup   Eating Deficit Setup; Beverage management   Grooming Assistance 5  Supervision/Setup   Grooming Deficit Setup;Standing with assistive device; Wash/dry hands  (standing at sink in bathroom)   [address] [place] [address] Deficit Setup;Supervision/safety; Increased time to complete; Don/doff R shoe;Don/doff L shoe  (slippers sitting EOB)   Toileting Assistance  4  Minimal Assistance   Toileting Deficit Setup;Verbal cueing;Supervison/safety; Increased time to complete;Grab bar use;Perineal hygiene  (crystal care seated on toilet)   Bed Mobility   Supine to Sit 5  Supervision   Additional items Assist x 1;HOB elevated; Bedrails; Increased time required;Verbal cues   Additional Comments OOB to recliner at end of session   Transfers   Sit to Stand 5  Supervision   Additional items Assist x 1; Increased time required;Verbal cues   Stand to Sit 5  Supervision   Additional items Assist x 1; Increased time required;Verbal cues   Toilet transfer 5  Supervision   Additional items Assist x 1; Increased time required;Verbal cues;Standard toilet  (VC for use of grab bar)   Functional Mobility   Functional Mobility 5  " Supervision   Additional Comments Short household distance to bathroom and back with RW with (S)  A for line management  Additional items Rolling walker   Balance   Static Sitting Good   Dynamic Sitting Fair +   Static Standing Fair +   Dynamic Standing Fair   Activity Tolerance   Activity Tolerance Patient limited by fatigue  (limited by cognition)   Nurse Made Aware yes, RN Marie Boyle ok to see pt   RUE Assessment   RUE Assessment WFL   LUE Assessment   LUE Assessment WFL   Hand Function   Gross Motor Coordination Functional   Fine Motor Coordination Functional   Sensation   Light Touch No apparent deficits   Vision-Basic Assessment   Current Vision Wears glasses all the time   Cognition   Overall Cognitive Status Impaired   Arousal/Participation Alert; Cooperative   Attention Attends with cues to redirect   Orientation Level Oriented to person;Disoriented to place; Disoriented to time;Disoriented to situation   Memory Decreased recall of recent events;Decreased recall of precautions;Decreased short term memory   Following Commands Follows one step commands without difficulty   Comments Pleasantly confused, despite reorientation pt continues to think she is visiting her son at [employer] throughout session  Tangential, whifty, requires redirection to task and VC for initiation and problem solving  Functional cognition generally WFL  Assessment   Limitation Decreased ADL status; Decreased Safe judgement during ADL;Decreased cognition;Decreased endurance;Decreased self-care trans;Decreased high-level ADLs;Mood limitation  (activity tolerance, forward functional reach, functional standing tolerance and overall strength, safety awareness, insight into deficits, orientation and problem solving, response time)   Prognosis Good   Assessment Pt is a 80 y o  female seen for OT evaluation s/p admit to [address] [place] [address] on 5/19/2023 w/Acute on chronic respiratory failure with hypoxia ([facility] )   Prior to admission, pt was living at [address], (I) with ADLs, (A) with IADLs  Personal and environmental factors affecting patient at time of evaluation include current habits and behavioral patterns, difficulty completing ADLs and difficulty completing IADLs  Personal factors supporting patient at time of evaluation include social support and accessible home environment  Based upon this evaluation, pt is functioning below baseline  Pt will benefit from continued skilled inpatient OT to maximize safety, level of independence overall performance in ADLs, functional transfers, functional mobility to return to functional baseline/highest level of function  Goals   Patient Goals to go to the bathroom   [facility] Time Frame 10-14   Long Term Goal #1 see goals below   Plan   Treatment Interventions ADL retraining;Functional transfer training; Endurance training;Cognitive reorientation;Patient/family training;Equipment evaluation/education; Compensatory technique education;Continued evaluation; Energy conservation; Activityengagement   Goal Expiration Date 06/02/23   OT Treatment Day 0   OT Frequency 2-3x/wk   Recommendation   OT Discharge Recommendation Return to facility with rehabilitation services   Additional Comments  The patient's raw score on the AM-PAC Daily Activity inpatient short form is 21, standardized score is 44 27, greater than 39 4  From an OT standpoint, patients at this level may benefit from d/c to Return to facility with rehabilitation services     AM-[facility] Daily Activity Inpatient   Lower Body Dressing 3   Bathing 3   Toileting 3   Upper Body Dressing 4   Grooming 4   Eating 4   Daily Activity Raw Score 21   Daily Activity Standardized Score (Calc for Raw Score >=11) 44 27   [facility] Applied Cognition Inpatient   Following a Speech/Presentation 2   Understanding Ordinary Conversation 4   Taking Medications 1   Remembering Where Things Are Placed or Put Away 2   Remembering List of 4-5 Errands 1   Taking Care of Complicated Tasks 1   Applied Cognition Raw Score 11   Applied Cognition Standardized Score 27 03   End of Consult   Patient Position at End of Consult Bedside chair;Bed/Chair alarm activated; All needs within reach   Nurse Communication Nurse aware of consult     GOALS:    *Goals established to promote patient goal of to go to the bathroom:      *ADL transfers with (I) for inc'd independence with ADLs/purposeful tasks    *Patient will perform grooming tasks standing at sink with (I) in order to increase overall independence    *LB ADL with (I) using AE prn for inc'd independence with self care    *Toileting with (I) for clothing management and hygiene to increase hygiene/thoroughness in order to reduce caregiver burden    *Increase stand tolerance x 5  m for inc'd tolerance with standing purposeful tasks    *Patient will engage in ongoing cognitive assessment to assist with safe discharge planning/recommendations       *Patient will increase functional mobility to and from bathroom with rolling walker independently to increase independence with toileting    *Pt will consistently follow multi step directions during ADL performance w/ less than 1 cue / prompt to max I and safety w/ ADL performance    Bere Campbell OTR/L    [place] License [id number]  [address] [facility] [phone]

## 2023-05-23 NOTE — ASSESSMENT & PLAN NOTE
· A previous CT scan done on 5/13 demonstrated diffuse heterogenous appearing cystic lung disease with diffuse groundglass opacities  · According to pulmonology evaluation, patient has no previous pulmonary history  · Etiology for cystic lung disease is unknown at this time, appearance of cystic disease is most concerning for pulmonary Langerhans' cell histiocytosis, or less likely a lymph angio leiomyomatosis, lymphocytic interstitial pneumonia, or Khai Carolene Sayer  · Patient wears 2 L of oxygen at baseline    Plan:  Pulmonology follow-up outpatient following euvolemia in the hospital

## 2023-05-24 NOTE — UTILIZATION REVIEW
NOTIFICATION OF ADMISSION DISCHARGE   This is a Notification of Discharge from [address]  Please be advised that this patient has been discharge from our facility  Below you will find the admission and discharge date and time including the patient’s disposition  UTILIZATION REVIEW CONTACT:  Marianne Amor MA  Utilization   Network Utilization Review Department  Phone: [phone] x carefully listen to the prompts  All voicemails are confidential   Email: [email or web address]     ADMISSION INFORMATION  PRESENTATION DATE: 5/19/2023  3:02 PM  OBERVATION ADMISSION DATE:   INPATIENT ADMISSION DATE: 5/19/23  5:55 PM   DISCHARGE DATE: 5/23/2023  1:25 PM   DISPOSITION:[facility] SNF/TCU/SNU    IMPORTANT INFORMATION:  Send all requests for admission clinical reviews, approved or denied determinations and any other requests to dedicated fax number below belonging to the campus where the patient is receiving treatment   List of dedicated fax numbers:  [address] DENIALS (Administrative/Medical Necessity) [phone]   [address] (Maternity/NICU/Pediatrics) [phone]   [facility] [phone]   [place] [address] [phone]   [address] [phone]   220 [facility] [phone]   [address] [facility] [phone]   [address] [place] [address] 119 [phone]   [facility]  [phone]   [address] [facility] [phone]   412 [facility] [address] [facility] [phone]

## 2023-06-16 DIAGNOSIS — E87.1 HYPONATREMIA: Primary | ICD-10-CM

## 2023-06-16 DIAGNOSIS — N18.31 STAGE 3A CHRONIC KIDNEY DISEASE (HCC): ICD-10-CM

## 2023-06-22 ENCOUNTER — TELEPHONE (OUTPATIENT)
Dept: PULMONOLOGY | Facility: CLINIC | Age: 88
End: 2023-06-22

## 2023-06-22 NOTE — TELEPHONE ENCOUNTER
Called patient to confirm an appointment and spoke with Phoenix Ybarra from [address]  Phoenix Ybarra expressed they were not aware of appointment and will give a call back to either cancel or confirm the appointment

## 2023-07-10 ENCOUNTER — TELEPHONE (OUTPATIENT)
Dept: NEPHROLOGY | Facility: CLINIC | Age: 88
End: 2023-07-10

## 2023-07-10 NOTE — TELEPHONE ENCOUNTER
Called at Facility and spoke with Deandre Oconnell to remained to please have labs done before the follow up appointment. Judi need to cancel appt. carlos patient is at hospice.